# Patient Record
Sex: MALE | Race: WHITE | NOT HISPANIC OR LATINO | Employment: OTHER | URBAN - METROPOLITAN AREA
[De-identification: names, ages, dates, MRNs, and addresses within clinical notes are randomized per-mention and may not be internally consistent; named-entity substitution may affect disease eponyms.]

---

## 2017-07-05 ENCOUNTER — HOSPITAL ENCOUNTER (INPATIENT)
Facility: HOSPITAL | Age: 72
LOS: 15 days | Discharge: NURSING, REHAB OR LONG-TERM CARE | End: 2017-07-21
Attending: EMERGENCY MEDICINE | Admitting: HOSPITALIST

## 2017-07-05 ENCOUNTER — APPOINTMENT (OUTPATIENT)
Dept: GENERAL RADIOLOGY | Facility: HOSPITAL | Age: 72
End: 2017-07-05

## 2017-07-05 DIAGNOSIS — I50.23 ACUTE ON CHRONIC SYSTOLIC HEART FAILURE (HCC): ICD-10-CM

## 2017-07-05 DIAGNOSIS — Z78.9 IMPAIRED MOBILITY AND ADLS: ICD-10-CM

## 2017-07-05 DIAGNOSIS — E78.5 HYPERLIPIDEMIA LDL GOAL <70: ICD-10-CM

## 2017-07-05 DIAGNOSIS — R60.9 PERIPHERAL EDEMA: Primary | ICD-10-CM

## 2017-07-05 DIAGNOSIS — I25.118 CORONARY ARTERY DISEASE OF NATIVE ARTERY OF NATIVE HEART WITH STABLE ANGINA PECTORIS (HCC): ICD-10-CM

## 2017-07-05 DIAGNOSIS — I50.23 ACUTE ON CHRONIC SYSTOLIC CONGESTIVE HEART FAILURE (HCC): ICD-10-CM

## 2017-07-05 DIAGNOSIS — Z74.09 IMPAIRED FUNCTIONAL MOBILITY, BALANCE, GAIT, AND ENDURANCE: ICD-10-CM

## 2017-07-05 DIAGNOSIS — J90 PLEURAL EFFUSION: ICD-10-CM

## 2017-07-05 DIAGNOSIS — Z74.09 IMPAIRED MOBILITY AND ADLS: ICD-10-CM

## 2017-07-05 DIAGNOSIS — J81.0 ACUTE PULMONARY EDEMA (HCC): ICD-10-CM

## 2017-07-05 LAB
ALBUMIN SERPL-MCNC: 3.4 G/DL (ref 3.2–4.8)
ALBUMIN/GLOB SERPL: 1.3 G/DL (ref 1.5–2.5)
ALP SERPL-CCNC: 110 U/L (ref 25–100)
ALT SERPL W P-5'-P-CCNC: 28 U/L (ref 7–40)
ANION GAP SERPL CALCULATED.3IONS-SCNC: 8 MMOL/L (ref 3–11)
AST SERPL-CCNC: 37 U/L (ref 0–33)
BASOPHILS # BLD AUTO: 0.02 10*3/MM3 (ref 0–0.2)
BASOPHILS NFR BLD AUTO: 0.2 % (ref 0–1)
BILIRUB SERPL-MCNC: 1 MG/DL (ref 0.3–1.2)
BNP SERPL-MCNC: 2432 PG/ML (ref 0–100)
BUN BLD-MCNC: 19 MG/DL (ref 9–23)
BUN/CREAT SERPL: 14.6 (ref 7–25)
CALCIUM SPEC-SCNC: 9 MG/DL (ref 8.7–10.4)
CHLORIDE SERPL-SCNC: 101 MMOL/L (ref 99–109)
CO2 SERPL-SCNC: 31 MMOL/L (ref 20–31)
CREAT BLD-MCNC: 1.3 MG/DL (ref 0.6–1.3)
DEPRECATED RDW RBC AUTO: 53.2 FL (ref 37–54)
EOSINOPHIL # BLD AUTO: 0.11 10*3/MM3 (ref 0–0.3)
EOSINOPHIL NFR BLD AUTO: 1.2 % (ref 0–3)
ERYTHROCYTE [DISTWIDTH] IN BLOOD BY AUTOMATED COUNT: 16.4 % (ref 11.3–14.5)
GFR SERPL CREATININE-BSD FRML MDRD: 54 ML/MIN/1.73
GLOBULIN UR ELPH-MCNC: 2.7 GM/DL
GLUCOSE BLD-MCNC: 138 MG/DL (ref 70–100)
HCT VFR BLD AUTO: 41.1 % (ref 38.9–50.9)
HGB BLD-MCNC: 13 G/DL (ref 13.1–17.5)
HOLD SPECIMEN: NORMAL
HOLD SPECIMEN: NORMAL
IMM GRANULOCYTES # BLD: 0.03 10*3/MM3 (ref 0–0.03)
IMM GRANULOCYTES NFR BLD: 0.3 % (ref 0–0.6)
LIPASE SERPL-CCNC: 38 U/L (ref 6–51)
LYMPHOCYTES # BLD AUTO: 0.61 10*3/MM3 (ref 0.6–4.8)
LYMPHOCYTES NFR BLD AUTO: 6.5 % (ref 24–44)
MCH RBC QN AUTO: 28.3 PG (ref 27–31)
MCHC RBC AUTO-ENTMCNC: 31.6 G/DL (ref 32–36)
MCV RBC AUTO: 89.3 FL (ref 80–99)
MONOCYTES # BLD AUTO: 1 10*3/MM3 (ref 0–1)
MONOCYTES NFR BLD AUTO: 10.7 % (ref 0–12)
NEUTROPHILS # BLD AUTO: 7.58 10*3/MM3 (ref 1.5–8.3)
NEUTROPHILS NFR BLD AUTO: 81.1 % (ref 41–71)
PLATELET # BLD AUTO: 235 10*3/MM3 (ref 150–450)
PMV BLD AUTO: 9.9 FL (ref 6–12)
POTASSIUM BLD-SCNC: 3.5 MMOL/L (ref 3.5–5.5)
PROT SERPL-MCNC: 6.1 G/DL (ref 5.7–8.2)
RBC # BLD AUTO: 4.6 10*6/MM3 (ref 4.2–5.76)
SODIUM BLD-SCNC: 140 MMOL/L (ref 132–146)
TROPONIN I SERPL-MCNC: 0.02 NG/ML (ref 0–0.07)
WBC NRBC COR # BLD: 9.35 10*3/MM3 (ref 3.5–10.8)
WHOLE BLOOD HOLD SPECIMEN: NORMAL
WHOLE BLOOD HOLD SPECIMEN: NORMAL

## 2017-07-05 PROCEDURE — 71010 HC CHEST PA OR AP: CPT

## 2017-07-05 PROCEDURE — 93005 ELECTROCARDIOGRAM TRACING: CPT | Performed by: EMERGENCY MEDICINE

## 2017-07-05 PROCEDURE — 83690 ASSAY OF LIPASE: CPT | Performed by: EMERGENCY MEDICINE

## 2017-07-05 PROCEDURE — 84484 ASSAY OF TROPONIN QUANT: CPT

## 2017-07-05 PROCEDURE — 80053 COMPREHEN METABOLIC PANEL: CPT | Performed by: EMERGENCY MEDICINE

## 2017-07-05 PROCEDURE — 99285 EMERGENCY DEPT VISIT HI MDM: CPT

## 2017-07-05 PROCEDURE — 85025 COMPLETE CBC W/AUTO DIFF WBC: CPT | Performed by: EMERGENCY MEDICINE

## 2017-07-05 PROCEDURE — 83880 ASSAY OF NATRIURETIC PEPTIDE: CPT | Performed by: EMERGENCY MEDICINE

## 2017-07-05 RX ORDER — ETHACRYNATE SODIUM 50 MG/50ML
50 INJECTION, POWDER, FOR SOLUTION INTRAVENOUS ONCE
Status: DISCONTINUED | OUTPATIENT
Start: 2017-07-05 | End: 2017-07-06

## 2017-07-05 RX ORDER — SODIUM CHLORIDE 0.9 % (FLUSH) 0.9 %
10 SYRINGE (ML) INJECTION AS NEEDED
Status: DISCONTINUED | OUTPATIENT
Start: 2017-07-05 | End: 2017-07-06

## 2017-07-05 RX ORDER — ASPIRIN 81 MG/1
324 TABLET, CHEWABLE ORAL ONCE
Status: DISCONTINUED | OUTPATIENT
Start: 2017-07-05 | End: 2017-07-05

## 2017-07-06 ENCOUNTER — APPOINTMENT (OUTPATIENT)
Dept: CARDIOLOGY | Facility: HOSPITAL | Age: 72
End: 2017-07-06

## 2017-07-06 PROBLEM — E11.9 DM (DIABETES MELLITUS) (HCC): Status: ACTIVE | Noted: 2017-07-06

## 2017-07-06 PROBLEM — I50.22 CHRONIC SYSTOLIC CHF (CONGESTIVE HEART FAILURE) (HCC): Status: ACTIVE | Noted: 2017-07-06

## 2017-07-06 PROBLEM — I25.10 CAD (CORONARY ARTERY DISEASE): Status: ACTIVE | Noted: 2017-07-06

## 2017-07-06 PROBLEM — I25.118 CORONARY ARTERY DISEASE OF NATIVE ARTERY OF NATIVE HEART WITH STABLE ANGINA PECTORIS (HCC): Status: ACTIVE | Noted: 2017-07-06

## 2017-07-06 PROBLEM — R60.9 PERIPHERAL EDEMA: Status: ACTIVE | Noted: 2017-07-06

## 2017-07-06 PROBLEM — E78.5 HYPERLIPIDEMIA: Status: ACTIVE | Noted: 2017-07-06

## 2017-07-06 PROBLEM — G62.9 PERIPHERAL NEUROPATHY: Status: ACTIVE | Noted: 2017-07-06

## 2017-07-06 PROBLEM — L97.929 BILATERAL LEG ULCER (HCC): Status: ACTIVE | Noted: 2017-07-06

## 2017-07-06 PROBLEM — L97.919 BILATERAL LEG ULCER (HCC): Status: ACTIVE | Noted: 2017-07-06

## 2017-07-06 PROBLEM — I50.23 ACUTE ON CHRONIC SYSTOLIC HEART FAILURE (HCC): Status: ACTIVE | Noted: 2017-07-06

## 2017-07-06 PROBLEM — I50.9 ACUTE ON CHRONIC HEART FAILURE (HCC): Status: ACTIVE | Noted: 2017-07-06

## 2017-07-06 PROBLEM — I10 HTN (HYPERTENSION): Status: ACTIVE | Noted: 2017-07-06
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
## 2017-07-06 PROBLEM — L03.90 CELLULITIS: Status: ACTIVE | Noted: 2017-07-06

## 2017-07-06 PROBLEM — N18.9 CKD (CHRONIC KIDNEY DISEASE): Status: ACTIVE | Noted: 2017-07-06

## 2017-07-06 LAB
BH CV LOWER VASCULAR LEFT COMMON FEMORAL AUGMENT: NORMAL
BH CV LOWER VASCULAR LEFT COMMON FEMORAL COMPRESS: NORMAL
BH CV LOWER VASCULAR LEFT COMMON FEMORAL PHASIC: NORMAL
BH CV LOWER VASCULAR LEFT COMMON FEMORAL SPONT: NORMAL
BH CV LOWER VASCULAR LEFT DISTAL FEMORAL COMPRESS: NORMAL
BH CV LOWER VASCULAR LEFT GASTRONEMIUS COMPRESS: NORMAL
BH CV LOWER VASCULAR LEFT GREATER SAPH AK COMPRESS: NORMAL
BH CV LOWER VASCULAR LEFT GREATER SAPH BK COMPRESS: NORMAL
BH CV LOWER VASCULAR LEFT MID FEMORAL AUGMENT: NORMAL
BH CV LOWER VASCULAR LEFT MID FEMORAL COMPRESS: NORMAL
BH CV LOWER VASCULAR LEFT MID FEMORAL PHASIC: NORMAL
BH CV LOWER VASCULAR LEFT MID FEMORAL SPONT: NORMAL
BH CV LOWER VASCULAR LEFT PERONEAL COMPRESS: NORMAL
BH CV LOWER VASCULAR LEFT POPLITEAL AUGMENT: NORMAL
BH CV LOWER VASCULAR LEFT POPLITEAL COMPRESS: NORMAL
BH CV LOWER VASCULAR LEFT POPLITEAL PHASIC: NORMAL
BH CV LOWER VASCULAR LEFT POPLITEAL SPONT: NORMAL
BH CV LOWER VASCULAR LEFT POSTERIOR TIBIAL COMPRESS: NORMAL
BH CV LOWER VASCULAR LEFT PROXIMAL FEMORAL COMPRESS: NORMAL
BH CV LOWER VASCULAR LEFT SAPHENOFEMORAL JUNCTION AUGMENT: NORMAL
BH CV LOWER VASCULAR LEFT SAPHENOFEMORAL JUNCTION COMPRESS: NORMAL
BH CV LOWER VASCULAR LEFT SAPHENOFEMORAL JUNCTION PHASIC: NORMAL
BH CV LOWER VASCULAR LEFT SAPHENOFEMORAL JUNCTION SPONT: NORMAL
BH CV LOWER VASCULAR RIGHT COMMON FEMORAL AUGMENT: NORMAL
BH CV LOWER VASCULAR RIGHT COMMON FEMORAL COMPRESS: NORMAL
BH CV LOWER VASCULAR RIGHT COMMON FEMORAL PHASIC: NORMAL
BH CV LOWER VASCULAR RIGHT COMMON FEMORAL SPONT: NORMAL
BH CV LOWER VASCULAR RIGHT DISTAL FEMORAL COMPRESS: NORMAL
BH CV LOWER VASCULAR RIGHT GASTRONEMIUS COMPRESS: NORMAL
BH CV LOWER VASCULAR RIGHT GREATER SAPH AK COMPRESS: NORMAL
BH CV LOWER VASCULAR RIGHT GREATER SAPH BK COMPRESS: NORMAL
BH CV LOWER VASCULAR RIGHT LESSER SAPH COMPRESS: NORMAL
BH CV LOWER VASCULAR RIGHT MID FEMORAL AUGMENT: NORMAL
BH CV LOWER VASCULAR RIGHT MID FEMORAL COMPRESS: NORMAL
BH CV LOWER VASCULAR RIGHT MID FEMORAL PHASIC: NORMAL
BH CV LOWER VASCULAR RIGHT MID FEMORAL SPONT: NORMAL
BH CV LOWER VASCULAR RIGHT PERONEAL COMPRESS: NORMAL
BH CV LOWER VASCULAR RIGHT POPLITEAL AUGMENT: NORMAL
BH CV LOWER VASCULAR RIGHT POPLITEAL COMPRESS: NORMAL
BH CV LOWER VASCULAR RIGHT POPLITEAL PHASIC: NORMAL
BH CV LOWER VASCULAR RIGHT POPLITEAL SPONT: NORMAL
BH CV LOWER VASCULAR RIGHT POSTERIOR TIBIAL COMPRESS: NORMAL
BH CV LOWER VASCULAR RIGHT PROXIMAL FEMORAL COMPRESS: NORMAL
BH CV LOWER VASCULAR RIGHT SAPHENOFEMORAL JUNCTION AUGMENT: NORMAL
BH CV LOWER VASCULAR RIGHT SAPHENOFEMORAL JUNCTION COMPRESS: NORMAL
BH CV LOWER VASCULAR RIGHT SAPHENOFEMORAL JUNCTION PHASIC: NORMAL
BH CV LOWER VASCULAR RIGHT SAPHENOFEMORAL JUNCTION SPONT: NORMAL
GLUCOSE BLDC GLUCOMTR-MCNC: 116 MG/DL (ref 70–130)
GLUCOSE BLDC GLUCOMTR-MCNC: 176 MG/DL (ref 70–130)
GLUCOSE BLDC GLUCOMTR-MCNC: 176 MG/DL (ref 70–130)
TROPONIN I SERPL-MCNC: 0.02 NG/ML
TROPONIN I SERPL-MCNC: 0.02 NG/ML

## 2017-07-06 PROCEDURE — 97165 OT EVAL LOW COMPLEX 30 MIN: CPT

## 2017-07-06 PROCEDURE — 63710000001 INSULIN LISPRO (HUMAN) PER 5 UNITS: Performed by: HOSPITALIST

## 2017-07-06 PROCEDURE — 25010000002 HEPARIN (PORCINE) PER 1000 UNITS: Performed by: NURSE PRACTITIONER

## 2017-07-06 PROCEDURE — 93010 ELECTROCARDIOGRAM REPORT: CPT | Performed by: INTERNAL MEDICINE

## 2017-07-06 PROCEDURE — 93970 EXTREMITY STUDY: CPT | Performed by: INTERNAL MEDICINE

## 2017-07-06 PROCEDURE — 97162 PT EVAL MOD COMPLEX 30 MIN: CPT

## 2017-07-06 PROCEDURE — 84484 ASSAY OF TROPONIN QUANT: CPT | Performed by: NURSE PRACTITIONER

## 2017-07-06 PROCEDURE — 93970 EXTREMITY STUDY: CPT

## 2017-07-06 PROCEDURE — 99223 1ST HOSP IP/OBS HIGH 75: CPT | Performed by: INTERNAL MEDICINE

## 2017-07-06 PROCEDURE — 93005 ELECTROCARDIOGRAM TRACING: CPT | Performed by: NURSE PRACTITIONER

## 2017-07-06 PROCEDURE — 99222 1ST HOSP IP/OBS MODERATE 55: CPT | Performed by: INTERNAL MEDICINE

## 2017-07-06 PROCEDURE — 82962 GLUCOSE BLOOD TEST: CPT

## 2017-07-06 RX ORDER — ATORVASTATIN CALCIUM 40 MG/1
40 TABLET, FILM COATED ORAL NIGHTLY
Status: DISCONTINUED | OUTPATIENT
Start: 2017-07-06 | End: 2017-07-06

## 2017-07-06 RX ORDER — BUMETANIDE 0.25 MG/ML
1 INJECTION INTRAMUSCULAR; INTRAVENOUS ONCE
Status: DISCONTINUED | OUTPATIENT
Start: 2017-07-06 | End: 2017-07-06

## 2017-07-06 RX ORDER — HEPARIN SODIUM 5000 [USP'U]/ML
5000 INJECTION, SOLUTION INTRAVENOUS; SUBCUTANEOUS EVERY 12 HOURS SCHEDULED
Status: DISCONTINUED | OUTPATIENT
Start: 2017-07-06 | End: 2017-07-21 | Stop reason: HOSPADM

## 2017-07-06 RX ORDER — ATORVASTATIN CALCIUM 20 MG/1
20 TABLET, FILM COATED ORAL NIGHTLY
Status: DISCONTINUED | OUTPATIENT
Start: 2017-07-06 | End: 2017-07-21 | Stop reason: HOSPADM

## 2017-07-06 RX ORDER — NICOTINE POLACRILEX 4 MG
15 LOZENGE BUCCAL
Status: DISCONTINUED | OUTPATIENT
Start: 2017-07-06 | End: 2017-07-21 | Stop reason: HOSPADM

## 2017-07-06 RX ORDER — SODIUM CHLORIDE 0.9 % (FLUSH) 0.9 %
1-10 SYRINGE (ML) INJECTION AS NEEDED
Status: DISCONTINUED | OUTPATIENT
Start: 2017-07-06 | End: 2017-07-21 | Stop reason: HOSPADM

## 2017-07-06 RX ORDER — ACETAMINOPHEN 325 MG/1
650 TABLET ORAL EVERY 4 HOURS PRN
Status: DISCONTINUED | OUTPATIENT
Start: 2017-07-06 | End: 2017-07-21 | Stop reason: HOSPADM

## 2017-07-06 RX ORDER — ETHACRYNIC ACID 25 MG/1
50 TABLET ORAL DAILY
Status: DISCONTINUED | OUTPATIENT
Start: 2017-07-06 | End: 2017-07-06

## 2017-07-06 RX ORDER — LOSARTAN POTASSIUM 50 MG/1
50 TABLET ORAL DAILY
Status: DISCONTINUED | OUTPATIENT
Start: 2017-07-06 | End: 2017-07-21 | Stop reason: HOSPADM

## 2017-07-06 RX ORDER — NITROGLYCERIN 0.4 MG/1
0.4 TABLET SUBLINGUAL
Status: DISCONTINUED | OUTPATIENT
Start: 2017-07-06 | End: 2017-07-21 | Stop reason: HOSPADM

## 2017-07-06 RX ORDER — DEXTROSE MONOHYDRATE 25 G/50ML
25 INJECTION, SOLUTION INTRAVENOUS
Status: DISCONTINUED | OUTPATIENT
Start: 2017-07-06 | End: 2017-07-21 | Stop reason: HOSPADM

## 2017-07-06 RX ORDER — CARVEDILOL 3.12 MG/1
3.12 TABLET ORAL EVERY 12 HOURS SCHEDULED
Status: DISCONTINUED | OUTPATIENT
Start: 2017-07-06 | End: 2017-07-21 | Stop reason: HOSPADM

## 2017-07-06 RX ORDER — ASPIRIN 81 MG/1
81 TABLET ORAL DAILY
Status: DISCONTINUED | OUTPATIENT
Start: 2017-07-06 | End: 2017-07-21 | Stop reason: HOSPADM

## 2017-07-06 RX ORDER — CALCIUM CARBONATE 200(500)MG
2 TABLET,CHEWABLE ORAL 2 TIMES DAILY PRN
Status: DISCONTINUED | OUTPATIENT
Start: 2017-07-06 | End: 2017-07-21 | Stop reason: HOSPADM

## 2017-07-06 RX ORDER — BUMETANIDE 0.25 MG/ML
1 INJECTION INTRAMUSCULAR; INTRAVENOUS 2 TIMES DAILY
Status: DISCONTINUED | OUTPATIENT
Start: 2017-07-06 | End: 2017-07-08

## 2017-07-06 RX ORDER — NYSTATIN 100000 [USP'U]/G
POWDER TOPICAL EVERY 12 HOURS SCHEDULED
Status: DISCONTINUED | OUTPATIENT
Start: 2017-07-06 | End: 2017-07-21 | Stop reason: HOSPADM

## 2017-07-06 RX ORDER — TRAZODONE HYDROCHLORIDE 50 MG/1
50 TABLET ORAL NIGHTLY PRN
COMMUNITY
End: 2017-07-21 | Stop reason: HOSPADM

## 2017-07-06 RX ORDER — ASPIRIN 81 MG/1
81 TABLET, CHEWABLE ORAL DAILY
COMMUNITY

## 2017-07-06 RX ADMIN — ASPIRIN 81 MG: 81 TABLET, COATED ORAL at 15:43

## 2017-07-06 RX ADMIN — ETHACRYNIC ACID 50 MG: 25 TABLET ORAL at 02:32

## 2017-07-06 RX ADMIN — CARVEDILOL 3.12 MG: 3.12 TABLET, FILM COATED ORAL at 15:43

## 2017-07-06 RX ADMIN — ATORVASTATIN CALCIUM 20 MG: 20 TABLET, FILM COATED ORAL at 21:32

## 2017-07-06 RX ADMIN — DOXYCYCLINE 100 MG: 100 INJECTION, POWDER, LYOPHILIZED, FOR SOLUTION INTRAVENOUS at 15:43

## 2017-07-06 RX ADMIN — CARVEDILOL 3.12 MG: 3.12 TABLET, FILM COATED ORAL at 21:33

## 2017-07-06 RX ADMIN — NYSTATIN: 100000 POWDER TOPICAL at 15:44

## 2017-07-06 RX ADMIN — BUMETANIDE 1 MG: 0.25 INJECTION INTRAMUSCULAR; INTRAVENOUS at 18:33

## 2017-07-06 RX ADMIN — BUMETANIDE 1 MG: 0.25 INJECTION INTRAMUSCULAR; INTRAVENOUS at 15:44

## 2017-07-06 RX ADMIN — HEPARIN SODIUM 5000 UNITS: 5000 INJECTION, SOLUTION INTRAVENOUS; SUBCUTANEOUS at 10:58

## 2017-07-06 RX ADMIN — INSULIN LISPRO 2 UNITS: 100 INJECTION, SOLUTION INTRAVENOUS; SUBCUTANEOUS at 21:33

## 2017-07-06 RX ADMIN — HEPARIN SODIUM 5000 UNITS: 5000 INJECTION, SOLUTION INTRAVENOUS; SUBCUTANEOUS at 21:32

## 2017-07-06 RX ADMIN — INSULIN LISPRO 2 UNITS: 100 INJECTION, SOLUTION INTRAVENOUS; SUBCUTANEOUS at 16:05

## 2017-07-06 RX ADMIN — NYSTATIN: 100000 POWDER TOPICAL at 21:33

## 2017-07-06 RX ADMIN — LOSARTAN POTASSIUM 50 MG: 50 TABLET, FILM COATED ORAL at 10:58

## 2017-07-06 NOTE — PROGRESS NOTES
"Adult Nutrition  Assessment/PES    Patient Name:  Luis F Plummer  YOB: 1945  MRN: [id number]  Admit Date:  7/5/2017    Assessment Date:  7/6/2017        Reason for Assessment       07/06/17 1546    Reason for Assessment    Reason For Assessment/Visit education    Time Spent (min) 15    Cardiac --   HF    Endocrine DM    Skin --   both great toes and bilat 3rd digit skin ulcers per MD notes    Other diagnosis --   BLE edema, bed bug bites both LE                Anthropometrics       07/06/17 1548    Anthropometrics    Height 180.3 cm (71\")    Weight 77.1 kg (170 lb)    Ideal Body Weight (IBW)    Ideal Body Weight (IBW), Male (kg) 79.27    % Ideal Body Weight 97.48    Body Mass Index (BMI)    BMI (kg/m2) 23.76            Labs/Tests/Procedures/Meds       07/06/17 1552    Labs/Tests/Procedures/Meds    Labs/Tests Review Reviewed   Hgb A1C value pending.                Nutrition Prescription Ordered       07/06/17 1550    Nutrition Prescription PO    Current PO Diet Regular    Common Modifiers Cardiac;Consistent Carbohydrate      07/06/17 1548    Nutrition Prescription PO    Common Modifiers Cardiac;Consistent Carbohydrate            Evaluation of Received Nutrient/Fluid Intake       07/06/17 1550    PO Evaluation    Number of Days PO Intake Evaluated Insufficient Data    Number of Meals 2    % PO Intake 100              Problem/Interventions:        Problem 1       07/06/17 1553    Nutrition Diagnoses Problem 1    Problem 1 Increased Nutrient Needs    Macronutrient Protein    Etiology (related to) --   decreased skin integrity    Signs/Symptoms (evidenced by) --   bilat great toe skin ulcers and bilat 3rd digit ulcer per MD notes.                    Intervention Goal       07/06/17 1550    Intervention Goal    PO Establish PO            Nutrition Intervention       07/06/17 1550    Nutrition Intervention    RD/Tech Action Follow Tx progress; supplement provided in an effort to promote po intake of  " additional leoncio and pro/d to support skin breakdown healing.            Nutrition Prescription       07/06/17 1550    Nutrition Prescription PO    PO Prescription Begin/change supplement    Supplement Boost Glucose Control    Supplement Frequency 2 times a day    New PO Prescription Ordered? Yes            Education/Evaluation       07/06/17 1551    Education    Education Education not appropriate at this time   RD spoke with nsg staff, and could see that  pt sleepy at time of attempted visit.      Monitor/Evaluation    Monitor Per protocol        Comments:      Electronically signed by:  Yris Cat MS,AGATHA,LD  07/06/17 3:55 PM

## 2017-07-06 NOTE — THERAPY EVALUATION
Acute Care - Physical Therapy Initial Evaluation  [facility]     Patient Name: Luis F Plummer  : 1945  MRN: [id number]  Today's Date: 2017   Onset of Illness/Injury or Date of Surgery Date: 17  Date of Referral to PT: 17  Referring Physician: FRANCO Irizarry      Admit Date: 2017     Visit Dx:    ICD-10-CM ICD-9-CM   1. Peripheral edema R60.9 782.3   2. Pleural effusion J90 511.9   3. Acute pulmonary edema J81.0 518.4   4. Acute on chronic systolic congestive heart failure I50.23 428.23     428.0   5. Hyperlipidemia LDL goal <70 E78.5 272.4   6. Acute on chronic systolic heart failure I50.23 428.23   7. Coronary artery disease of native artery of native heart with stable angina pectoris I25.118 414.01     413.9   8. Impaired mobility and ADLs Z74.09 799.89   9. Impaired functional mobility, balance, gait, and endurance Z74.09 V49.89     Patient Active Problem List   Diagnosis   • Peripheral edema   • Acute on chronic systolic heart failure   • Coronary artery disease of native artery of native heart with stable angina pectoris   • Essential hypertension   • Hyperlipidemia LDL goal <70   • DM (diabetes mellitus)   • Bilateral leg ulcer   • CKD (chronic kidney disease)   • Bed bug bite   • Peripheral neuropathy   • Cellulitis-legs/feet with chronic ulcer     Past Medical History:   Diagnosis Date   • Diabetes mellitus      Past Surgical History:   Procedure Laterality Date   • BACK SURGERY            PT ASSESSMENT (last 72 hours)      PT Evaluation       17 1311 17 1310    Rehab Evaluation    Document Type evaluation  -JR evaluation  -SJ    Subjective Information no complaints;agree to therapy  -JR no complaints;agree to therapy  -SJ    Patient Effort, Rehab Treatment adequate  -JR adequate  -SJ    Symptoms Noted During/After Treatment none  -JR none  -SJ    General Information    Patient Profile Review yes  -JR yes  -SJ    Onset of Illness/Injury or Date of Surgery Date  07/05/17  -JR 07/05/17  -SJ    Referring Physician FRANCO Irizarry  -FRANCO Dorado  -LUCIUS    General Observations  Pt supine in bed, awake, has tele. BLE's swollen, with multiple scabs  -SJ    Pertinent History Of Current Problem Pt admitted with recurrent B LE swelling which has worsened x 4 days. Pt found to have acute exacerbation of CHF.  -JR Pt admitted with recurrent BLE edema, found to have acute exacerbation of CHF  -SJ    Precautions/Limitations fall precautions;oxygen therapy device and L/min   contact precautions  -JR fall precautions   contact precautions, contamination (bed bugs)  -SJ    Prior Level of Function independent:;gait;transfer;bed mobility;ADL's   Was using cane prior to admit  -JR independent:;gait;transfer;bed mobility  -SJ    Equipment Currently Used at Home wheelchair;walker, rolling  -JR wheelchair;walker, rolling;cane, straight  -SJ    Plans/Goals Discussed With patient;agreed upon  -JR patient;agreed upon  -SJ    Risks Reviewed patient:;increased discomfort  -JR patient:;LOB;increased discomfort  -SJ    Benefits Reviewed patient:;improve function;increase independence  -JR patient:;improve function;increase independence;increase strength;increase balance;increase knowledge  -SJ    Barriers to Rehab none identified  -JR none identified  -SJ    Living Environment    Lives With alone  -JR alone  -SJ    Living Arrangements house  -JR house  -SJ    Home Accessibility stairs to enter home  -JR stairs to enter home  -SJ    Number of Stairs to Enter Home 1  -JR 1  -SJ    Clinical Impression    Date of Referral to PT  07/06/17  -SJ    PT Diagnosis  impaired mobility  -SJ    Patient/Family Goals Statement  did not state  -SJ    Criteria for Skilled Therapeutic Interventions Met  yes;treatment indicated  -SJ    Pathology/Pathophysiology Noted (Describe Specifically for Each System)  musculoskeletal;integumentary  -SJ    Impairments Found (describe specific impairments)  gait, locomotion, and  balance;arousal, attention, and cognition  -SJ    Functional Limitations in Following Categories (Describe Specific Limitations)  self-care;home management;community/leisure  -SJ    Rehab Potential  good, to achieve stated therapy goals  -SJ    Predicted Duration of Therapy Intervention (days/wks)  2wks  -SJ    Vital Signs    Pre Systolic BP Rehab 142  -  -SJ    Pre Treatment Diastolic BP 98  -JR 98  -SJ    Post Systolic BP Rehab 148  -  -SJ    Post Treatment Diastolic BP 97  -JR 97  -SJ    Pretreatment Heart Rate (beats/min) 73  -JR 73  -SJ    Posttreatment Heart Rate (beats/min) 77  -JR 77  -SJ    Pre SpO2 (%) 90  -JR 90  -SJ    O2 Delivery Pre Treatment room air  -JR room air  -SJ    Post SpO2 (%) 100  -  -SJ    O2 Delivery Post Treatment room air  -JR room air  -SJ    Pre Patient Position Supine  -JR Supine  -SJ    Intra Patient Position Standing  -JR Standing  -SJ    Post Patient Position Sitting  -JR Sitting  -SJ    Pain Assessment    Pain Assessment 0-10  -JR 0-10  -SJ    Pain Score 7  -JR 7  -SJ    Post Pain Score 7  -JR 7  -SJ    Pain Location Leg  -JR Leg  -SJ    Pain Orientation Right;Left  -JR Right;Left  -SJ    Pain Intervention(s) Ambulation/increased activity  -JR Repositioned;Ambulation/increased activity;Elevated  -SJ    Response to Interventions tolerated  -JR jocelin  -SJ    Cognitive Assessment/Intervention    Current Cognitive/Communication Assessment impaired  -JR impaired  -SJ    Orientation Status oriented to;person;place  -JR oriented to;person;place  -SJ    Follows Commands/Answers Questions 100% of the time;able to follow single-step instructions  -% of the time;able to follow single-step instructions;needs cueing;needs increased time  -SJ    Personal Safety mild impairment;decreased awareness, need for assist;decreased awareness, need for safety;decreased insight to deficits  -JR mild impairment;decreased awareness, need for assist;decreased awareness, need for  safety;decreased insight to deficits  -SJ    Additional Documentation  Cognitive Assessment Intervention (Group)  -SJ    Cognitive Assessment Intervention    Behavior/Mood Observations  cooperative;other (see comments)   distrustful  -SJ    ROM (Range of Motion)    General ROM no range of motion deficits identified  -JR no range of motion deficits identified  -SJ    MMT (Manual Muscle Testing)    General MMT Assessment no strength deficits identified  -JR lower extremity strength deficits identified  -SJ    General MMT Assessment Detail  Bilat hip flex 3+/5, bilat knee flex/ext 4/5  -SJ    Bed Mobility, Assessment/Treatment    Bed Mobility, Assistive Device head of bed elevated  -JR head of bed elevated  -SJ    Bed Mob, Supine to Sit, [place] supervision required  -JR supervision required  -SJ    Transfer Assessment/Treatment    Transfers, Bed-Chair [place] moderate assist (50% patient effort);2 person assist required  -JR moderate assist (50% patient effort);2 person assist required;verbal cues required  -SJ    Transfers, Bed-Chair-Bed, Assist Device other (see comments)   UE support x2  -JR other (see comments)   BUE support x2  -SJ    Transfers, Sit-Stand [place] contact guard assist;2 person assist required  -JR contact guard assist;2 person assist required;verbal cues required  -SJ    Transfers, Stand-Sit [place] contact guard assist;2 person assist required  -JR contact guard assist;2 person assist required;verbal cues required  -SJ    Transfers, Sit-Stand-Sit, Assist Device rolling walker  -JR rolling walker  -SJ    Transfer, Safety Issues balance decreased during turns;step length decreased;weight-shifting ability decreased  -JR balance decreased during turns;step length decreased;weight-shifting ability decreased;loses balance backward  -SJ    Transfer, Impairments strength decreased;impaired balance;postural control impaired  -JR strength decreased;impaired balance;postural control  impaired  -SJ    Transfer, Comment Pt intially leaning posterior and to the R in standing, transferred to the chair. Pt stood with walker with CGA with verbal cues for hand placement with transfer.  - cues for safety and sequencing  -    Gait Assessment/Treatment    Gait, [place] Level  contact guard assist;2 person assist required;verbal cues required  -SJ    Gait, Assistive Device  rolling walker  -    Gait, Distance (Feet)  20  -SJ    Gait, Gait Pattern Analysis  swing-through gait  -    Gait, Gait Deviations  antalgic;[place] decreased;decreased heel strike;double stance time increased;forward flexed posture  -    Gait, Safety Issues  sequencing ability decreased;step length decreased  -    Gait, Impairments  strength decreased;impaired balance;coordination impaired;pain  -    Gait, Comment  distance limited to in-room due to containment precautions  -    Motor Skills/Interventions    Additional Documentation Balance Skills Training (Group)  -     Balance Skills Training    Sitting-Level of Assistance Independent  - Independent  -    Sitting-Balance Support Feet supported  - Feet supported  -    Standing-Level of Assistance Contact guard  -JR Contact guard  -    Static Standing Balance Support assistive device  - assistive device  -    Gait Balance-Level of Assistance Contact guard  -JR Contact guard  -SJ    Gait Balance Support assistive device  -JR assistive device  -    Positioning and Restraints    Pre-Treatment Position in bed  -JR in bed  -    Post Treatment Position chair  - chair  -    In Chair notified nsg;reclined;call light within reach;encouraged to call for assist;RLE elevated;LLE elevated;exit alarm on  -JR reclined;call light within reach;encouraged to call for assist;exit alarm on;heels elevated  -      07/06/17 1244 07/06/17 0300    General Information    Equipment Currently Used at Home none   Pt reports he has a wheelchair and walker but doesn't  use  -BG none  -CJ    Living Environment    Lives With alone  -BG alone  -CJ    Living Arrangements house  -BG house  -CJ    Home Accessibility no concerns  -BG bed and bath on same level  -CJ    Stair Railings at Home  inside, present at both sides  -CJ    Type of Financial/Environmental Concern  none  -CJ    Transportation Available car;family or friend will provide  -BG none  -CJ      User Key  (r) = Recorded By, (t) = Taken By, (c) = Cosigned By    Initials Name Provider Type     Sakshi Gutierrez, PT Physical Therapist    JR Hilary Bolden, OT Occupational Therapist    RADAMES Ortega, RN Registered Nurse    BG Josephine Sheppard, MSW           Physical Therapy Education     Title: PT OT SLP Therapies (Active)     Topic: Physical Therapy (Active)     Point: Mobility training (Active)    Learning Progress Summary    Learner Readiness Method Response Comment Documented by Status   Patient Acceptance E NR   07/06/17 1412 Active               Point: Body mechanics (Active)    Learning Progress Summary    Learner Readiness Method Response Comment Documented by Status   Patient Acceptance E NR   07/06/17 1412 Active               Point: Precautions (Active)    Learning Progress Summary    Learner Readiness Method Response Comment Documented by Status   Patient Acceptance E NR   07/06/17 1412 Active                      User Key     Initials Effective Dates Name Provider Type Discipline     06/19/15 -  Sakshi Gutierrez, PT Physical Therapist PT                PT Recommendation and Plan  Anticipated Discharge Disposition: inpatient rehabilitation facility  Planned Therapy Interventions: balance training, bed mobility training, gait training, home exercise program, patient/family education, strengthening, transfer training  PT Frequency: daily  Plan of Care Review  Plan Of Care Reviewed With: patient  Progress: progress toward functional goals as expected  Outcome Summary/Follow up Plan: Pt  presents with weakness and decreased independence with functional mobility. Pt ambulated 20ft with RW with CGA x2, dem decreased safety awareness, mild confusion. PT recommends d/c to inpatient rehab.          IP PT Goals       07/06/17 1410          Transfer Training PT LTG    Transfer Training PT LTG, Date Established 07/06/17  -SJ      Transfer Training PT LTG, Time to Achieve 2 wks  -SJ      Transfer Training PT LTG, Activity Type bed to chair /chair to bed;sit to stand/stand to sit  -SJ      Transfer Training PT LTG, [place] Level supervision required  -SJ      Transfer Training PT LTG, Assist Device walker, rolling  -SJ      Transfer Training PT LTG, Outcome goal ongoing  -SJ      Gait Training PT LTG    Gait Training Goal PT LTG, Date Established 07/06/17  -SJ      Gait Training Goal PT LTG, Time to Achieve 2 wks  -SJ      Gait Training Goal PT LTG, [place] Level supervision required  -SJ      Gait Training Goal PT LTG, Assist Device walker, rolling  -SJ      Gait Training Goal PT LTG, Distance to Achieve 200  -SJ      Gait Training Goal PT LTG, Outcome goal ongoing  -SJ      Dynamic Standing Balance PT LTG    Dynamic Standing Balance PT LTG, Date Established 07/06/17  -SJ      Dynamic Standing Balance PT LTG, Time to Achieve 2 wks  -SJ      Dynamic Standing Balance PT LTG, [place] Level contact guard assist  -SJ      Dynamic Standing Balance PT LTG, Outcome goal ongoing  -SJ        User Key  (r) = Recorded By, (t) = Taken By, (c) = Cosigned By    Initials Name Provider Type    LUCIUS Gutierrez, PT Physical Therapist                Outcome Measures       07/06/17 1311 07/06/17 1310       How much help from another person do you currently need...    Turning from your back to your side while in flat bed without using bedrails?  4  -SJ     Moving from lying on back to sitting on the side of a flat bed without bedrails?  4  -SJ     Moving to and from a bed to a chair (including a wheelchair)?  3   -SJ     Standing up from a chair using your arms (e.g., wheelchair, bedside chair)?  3  -SJ     Climbing 3-5 steps with a railing?  2  -SJ     To walk in hospital room?  3  -SJ     AM-PAC 6 Clicks Score  19  -SJ     How much help from another is currently needed...    Putting on and taking off regular lower body clothing? 1  -JR      Bathing (including washing, rinsing, and drying) 2  -JR      Toileting (which includes using toilet bed pan or urinal) 2  -JR      Putting on and taking off regular upper body clothing 3  -JR      Taking care of personal grooming (such as brushing teeth) 4  -JR      Eating meals 4  -JR      Score 16  -JR      Functional Assessment    Outcome Measure Options AM-PAC 6 Clicks Daily Activity (OT)  -JR AM-PAC 6 Clicks Basic Mobility (PT)  -       User Key  (r) = Recorded By, (t) = Taken By, (c) = Cosigned By    Initials Name Provider Type     Sakshi Gutierrez PT Physical Therapist    JR Hilary Bolden, OT Occupational Therapist           Time Calculation:         PT Charges       07/06/17 1413          Time Calculation    Start Time 1310  -SJ      PT Received On 07/06/17  -      PT Goal Re-Cert Due Date 07/16/17  -        User Key  (r) = Recorded By, (t) = Taken By, (c) = Cosigned By    Initials Name Provider Type     Sakshi Gutierrez PT Physical Therapist          Therapy Charges for Today     Code Description Service Date Service Provider Modifiers Qty    [id number] HC PT EVAL MOD COMPLEXITY 4 7/6/2017 Sakshi Gutierrez, PT GP 1          PT G-Codes  Outcome Measure Options: AM-PAC 6 Clicks Daily Activity (OT)      Sakshi Gutierrez PT  7/6/2017

## 2017-07-06 NOTE — PLAN OF CARE
Problem: Patient Care Overview (Adult)  Goal: Plan of Care Review  Outcome: Ongoing (interventions implemented as appropriate)    07/06/17 1401   Coping/Psychosocial Response Interventions   Plan Of Care Reviewed With patient   Outcome Evaluation   Outcome Summary/Follow up Plan OT initial eval completed. Pt presents with decreased independence with ADL's and mobility, decreased knowledge of AE. Pt would benefit from continued skilled OT services to assist in returning pt to his PLOF. Recommend inpatient rehab at d/c.         Problem: Inpatient Occupational Therapy  Goal: Bed Mobility Goal LTG- OT  Outcome: Ongoing (interventions implemented as appropriate)    07/06/17 1401   Bed Mobility OT LTG   Bed Mobility OT LTG, Date Established 07/06/17   Bed Mobility OT LTG, Time to Achieve 1 wk   Bed Mobility OT LTG, Activity Type all bed mobility   Bed Mobility OT LTG, [place] Level conditional independence       Goal: Transfer Training Goal 1 LTG- OT  Outcome: Ongoing (interventions implemented as appropriate)    07/06/17 1401   Transfer Training OT LTG   Transfer Training OT LTG, Date Established 07/06/17   Transfer Training OT LTG, Time to Achieve 1 wk   Transfer Training OT LTG, Activity Type bed to chair /chair to bed   Transfer Training OT LTG, [place] Level supervision required   Transfer Training OT LTG, Assist Device other (see comments)  (with appropriate AD)       Goal: LB Dressing Goal LTG- OT  Outcome: Ongoing (interventions implemented as appropriate)    07/06/17 1401   LB Dressing OT LTG   LB Dressing Goal OT LTG, Date Established 07/06/17   LB Dressing Goal OT LTG, Time to Achieve 1 wk   LB Dressing Goal OT LTG, Activity Type alvin/doff B socks   LB Dressing Goal OT LTG, [place] Level moderate assist (50% patient effort)   LB Dressing Goal OT LTG, Adaptive Equipment reacher;sock-aid

## 2017-07-06 NOTE — PLAN OF CARE
Problem: Patient Care Overview (Adult)  Goal: Plan of Care Review  Outcome: Ongoing (interventions implemented as appropriate)    07/06/17 1410   Coping/Psychosocial Response Interventions   Plan Of Care Reviewed With patient   Patient Care Overview   Progress progress toward functional goals as expected   Outcome Evaluation   Outcome Summary/Follow up Plan Pt presents with weakness and decreased independence with functional mobility. Pt ambulated 20ft with RW with CGA x2, dem decreased safety awareness, mild confusion. PT recommends d/c to inpatient rehab.         Problem: Inpatient Physical Therapy  Goal: Transfer Training Goal 1 LTG- PT  Outcome: Ongoing (interventions implemented as appropriate)    07/06/17 1410   Transfer Training PT LTG   Transfer Training PT LTG, Date Established 07/06/17   Transfer Training PT LTG, Time to Achieve 2 wks   Transfer Training PT LTG, Activity Type bed to chair /chair to bed;sit to stand/stand to sit   Transfer Training PT LTG, [place] Level supervision required   Transfer Training PT LTG, Assist Device walker, rolling   Transfer Training PT LTG, Outcome goal ongoing       Goal: Gait Training Goal LTG- PT  Outcome: Ongoing (interventions implemented as appropriate)    07/06/17 1410   Gait Training PT LTG   Gait Training Goal PT LTG, Date Established 07/06/17   Gait Training Goal PT LTG, Time to Achieve 2 wks   Gait Training Goal PT LTG, [place] Level supervision required   Gait Training Goal PT LTG, Assist Device walker, rolling   Gait Training Goal PT LTG, Distance to Achieve 200   Gait Training Goal PT LTG, Outcome goal ongoing       Goal: Dynamic Standing Balance Goal LTG- PT  Outcome: Ongoing (interventions implemented as appropriate)    07/06/17 1410   Dynamic Standing Balance PT LTG   Dynamic Standing Balance PT LTG, Date Established 07/06/17   Dynamic Standing Balance PT LTG, Time to Achieve 2 wks   Dynamic Standing Balance PT LTG, [place] Level contact  guard assist   Dynamic Standing Balance PT LTG, Outcome goal ongoing

## 2017-07-06 NOTE — PROGRESS NOTES
IM update note: Pt admitted after midnight by Dr. Aceves, H&P reviewed.    70 yo M, non-smoker, with history CAD, HTN, HLD, and DM2, who is noncompliant with medical therapy, reportedly due to financial difficulties, came in with worsening LE swelling and pain. + intermittent CP/dyspnea, but currently denies any. He reported a recent fall and c/o back pain/R rib cage pain.    Diagnostic data reviewed.    LE/scrotum are significantly edematous. Scattered erythematous lesions, consistent with bug bites, on both LE. Both great toes have ulcer on lateral plantar surface, but the L great toe is bulbous/vilaceous. Bilat 3rd digit also have ulcer at tip of toe.    A/P:  70 yo M with history and prsented as above. Already seen by cardiology and aggressively being diuresed. Echo and duple of LE pending. I will add doxy for cellulitis of LE. I suspect poss chronic osteomyelitis of L great toe, so I will get MRI for further evaluation-depending on results, I may need to get ID and Dr. Becker involve. Will also check TSH and A1C. Pt counseled about importance of medication compliance. SW consulted to assist with DC need/home safety eval and medication coverage due to financial difficulties. PT/OT to evaluate. Diabetic educator and nutritionist to see and provide education.  Cont with low dose SSI for now and qACHS accucheks.

## 2017-07-06 NOTE — THERAPY EVALUATION
Acute Care - Occupational Therapy Initial Evaluation  [facility]     Patient Name: Luis F Plummer  : 1945  MRN: [id number]  Today's Date: 2017  Onset of Illness/Injury or Date of Surgery Date: 17  Date of Referral to OT: 17  Referring Physician: FRANCO Irizarry    Admit Date: 2017       ICD-10-CM ICD-9-CM   1. Peripheral edema R60.9 782.3   2. Pleural effusion J90 511.9   3. Acute pulmonary edema J81.0 518.4   4. Acute on chronic systolic congestive heart failure I50.23 428.23     428.0   5. Hyperlipidemia LDL goal <70 E78.5 272.4   6. Acute on chronic systolic heart failure I50.23 428.23   7. Coronary artery disease of native artery of native heart with stable angina pectoris I25.118 414.01     413.9   8. Impaired mobility and ADLs Z74.09 799.89     Patient Active Problem List   Diagnosis   • Peripheral edema   • Acute on chronic systolic heart failure   • Coronary artery disease of native artery of native heart with stable angina pectoris   • Essential hypertension   • Hyperlipidemia LDL goal <70   • DM (diabetes mellitus)   • Bilateral leg ulcer   • CKD (chronic kidney disease)   • Bed bug bite   • Peripheral neuropathy   • Cellulitis-legs/feet with chronic ulcer     Past Medical History:   Diagnosis Date   • Diabetes mellitus      Past Surgical History:   Procedure Laterality Date   • BACK SURGERY            OT ASSESSMENT FLOWSHEET (last 72 hours)      OT Evaluation       17 1311 17 1310 17 1244 17 0300       Rehab Evaluation    Document Type evaluation  -JR (P)  evaluation  -SJ       Subjective Information no complaints;agree to therapy  -JR (P)  no complaints;agree to therapy  -SJ       Patient Effort, Rehab Treatment adequate  -JR (P)  adequate  -SJ       Symptoms Noted During/After Treatment none  -JR (P)  none  -SJ       General Information    Patient Profile Review yes  -JR (P)  yes  -SJ       Onset of Illness/Injury or Date of Surgery Date 17   -JR (P)  07/05/17  -SJ       Referring Physician FRANCO Irizarry  -JR (P)  FRANCO Irizarry  -SJ       General Observations  (P)  Pt supine in bed, awake, has tele. BLE's swollen, with multiple scabs  -SJ       Pertinent History Of Current Problem Pt admitted with recurrent B LE swelling which has worsened x 4 days. Pt found to have acute exacerbation of CHF.  -JR (P)  Pt admitted with recurrent BLE edema, found to have acute exacerbation of CHF  -SJ       Precautions/Limitations fall precautions;oxygen therapy device and L/min   contact precautions  -JR (P)  fall precautions   contact precautions, contamination (bed bugs)  -SJ       Prior Level of Function independent:;gait;transfer;bed mobility;ADL's   Was using cane prior to admit  -JR (P)  independent:;gait;transfer;bed mobility  -SJ       Equipment Currently Used at Home wheelchair;walker, rolling  -JR (P)  wheelchair;walker, rolling;cane, straight  -SJ none   Pt reports he has a wheelchair and walker but doesn't use  -BG none  -CJ     Plans/Goals Discussed With patient;agreed upon  -JR (P)  patient;agreed upon  -SJ       Risks Reviewed patient:;increased discomfort  -JR (P)  patient:;LOB;increased discomfort  -SJ       Benefits Reviewed patient:;improve function;increase independence  -JR (P)  patient:;improve function;increase independence;increase strength;increase balance;increase knowledge  -SJ       Barriers to Rehab none identified  -JR (P)  none identified  -SJ       Living Environment    Lives With alone  -JR (P)  alone  -SJ alone  -BG alone  -CJ     Living Arrangements house  -JR (P)  house  -SJ house  -BG house  -CJ     Home Accessibility stairs to enter home  -JR (P)  stairs to enter home  -SJ no concerns  -BG bed and bath on same level  -CJ     Number of Stairs to Enter Home 1  -JR (P)  1  -SJ       Stair Railings at Home    inside, present at both sides  -CJ     Type of Financial/Environmental Concern    none  -CJ     Transportation Available    car;family or friend will provide  -BG none  -CJ     Clinical Impression    Date of Referral to OT 07/06/17  -JR        OT Diagnosis Decreased independence with ADL's and mobility  -JR        Patient/Family Goals Statement Return home  -JR        Criteria for Skilled Therapeutic Interventions Met yes;treatment indicated  -JR        Rehab Potential good, to achieve stated therapy goals  -JR        Therapy Frequency daily  -JR        Anticipated Equipment Needs At Discharge bedside commode;tub bench  -JR        Anticipated Discharge Disposition inpatient rehabilitation facility  -JR        Functional Level Prior    Ambulation   0-->independent  -BG 0-->independent  -CJ     Transferring   0-->independent  -BG 0-->independent  -CJ     Toileting   0-->independent  -BG 0-->independent  -CJ     Bathing   0-->independent  -BG 0-->independent  -CJ     Dressing   0-->independent  -BG 0-->independent  -CJ     Eating   0-->independent  -BG 0-->independent  -CJ     Communication   0-->understands/communicates without difficulty  -BG 0-->understands/communicates without difficulty  -CJ     Swallowing    0-->swallows foods/liquids without difficulty  -CJ     Vital Signs    Pre Systolic BP Rehab 142  -JR        Pre Treatment Diastolic BP 98  -JR        Post Systolic BP Rehab 148  -JR        Post Treatment Diastolic BP 97  -JR        Pretreatment Heart Rate (beats/min) 73  -JR        Posttreatment Heart Rate (beats/min) 77  -JR        Pre SpO2 (%) 90  -JR        O2 Delivery Pre Treatment room air  -JR        Post SpO2 (%) 100  -JR        O2 Delivery Post Treatment room air  -JR        Pre Patient Position Supine  -JR        Intra Patient Position Standing  -JR        Post Patient Position Sitting  -JR        Pain Assessment    Pain Assessment 0-10  -JR        Pain Score 7  -JR        Post Pain Score 7  -JR        Pain Location Leg  -JR        Pain Orientation Right;Left  -JR        Pain Intervention(s) Ambulation/increased activity   -JR        Response to Interventions tolerated  -JR        Cognitive Assessment/Intervention    Current Cognitive/Communication Assessment impaired  -JR        Orientation Status oriented to;person;place  -JR        Follows Commands/Answers Questions 100% of the time;able to follow single-step instructions  -JR        Personal Safety mild impairment;decreased awareness, need for assist;decreased awareness, need for safety;decreased insight to deficits  -JR        ROM (Range of Motion)    General ROM no range of motion deficits identified  -JR        MMT (Manual Muscle Testing)    General MMT Assessment no strength deficits identified  -JR        Bed Mobility, Assessment/Treatment    Bed Mobility, Assistive Device head of bed elevated  -JR        Bed Mob, Supine to Sit, [place] supervision required  -JR        Transfer Assessment/Treatment    Transfers, Bed-Chair [place] moderate assist (50% patient effort);2 person assist required  -JR        Transfers, Bed-Chair-Bed, Assist Device other (see comments)   UE support x2  -JR        Transfers, Sit-Stand [place] contact guard assist;2 person assist required  -JR        Transfers, Stand-Sit [place] contact guard assist;2 person assist required  -JR        Transfers, Sit-Stand-Sit, Assist Device rolling walker  -JR        Transfer, Safety Issues balance decreased during turns;step length decreased;weight-shifting ability decreased  -JR        Transfer, Impairments strength decreased;impaired balance;postural control impaired  -JR        Transfer, Comment Pt intially leaning posterior and to the R in standing, transferred to the chair. Pt stood with walker with CGA with verbal cues for hand placement with transfer.  -JR        Functional Mobility    Functional Mobility- Ind. Level contact guard assist;2 person assist required  -JR        Functional Mobility- Device rolling walker  -JR        Functional Mobility-Distance (Feet) --   in room  -JR         Functional Mobility- Safety Issues balance decreased during turns;step length decreased;weight-shifting ability decreased  -JR        Lower Body Dressing Assessment/Training    LB Dressing Assess/Train, Clothing Type donning:;slipper socks  -JR        LB Dressing Assess/Train, Position supine  -JR        LB Dressing Assess/Train, [place] dependent (less than 25% patient effort)  -JR        LB Dressing Assess/Train, Impairments decreased flexibility;strength decreased  -JR        Motor Skills/Interventions    Additional Documentation Balance Skills Training (Group)  -JR        Balance Skills Training    Sitting-Level of Assistance Independent  -JR        Sitting-Balance Support Feet supported  -JR        Standing-Level of Assistance Contact guard  -JR        Static Standing Balance Support assistive device  -JR        Gait Balance-Level of Assistance Contact guard  -JR        Gait Balance Support assistive device  -JR        General Therapy Interventions    Planned Therapy Interventions ADL retraining;adaptive equipment training;balance training;bed mobility training;transfer training  -JR        Positioning and Restraints    Pre-Treatment Position in bed  -JR        Post Treatment Position chair  -JR        In Chair notified nsg;reclined;call light within reach;encouraged to call for assist;RLE elevated;LLE elevated;exit alarm on  -JR          User Key  (r) = Recorded By, (t) = Taken By, (c) = Cosigned By    Initials Name Effective Dates    LUCIUS Gutierrez, PT 06/19/15 -     JR Hilary Bolden, OT 06/22/15 -     RADAMES Ortega RN 06/16/16 -     BG Josephine Sheppard, MSW 07/18/16 -            Occupational Therapy Education     Title: PT OT SLP Therapies (Active)     Topic: Occupational Therapy (Active)     Point: ADL training (Active)    Description: Instruct learner(s) on proper safety adaptation and remediation techniques during self care or transfers.   Instruct in proper use of assistive devices.     Learning Progress Summary    Learner Readiness Method Response Comment Documented by Status   Patient Acceptance E NR Educated pt on appropriate transfer techniques, appropiate safety precautions, role of therapy and benefits of activity.  07/06/17 1359 Active                      User Key     Initials Effective Dates Name Provider Type Discipline     06/22/15 -  Hilary Bolden, OT Occupational Therapist OT                  OT Recommendation and Plan  Anticipated Equipment Needs At Discharge: bedside commode, tub bench  Anticipated Discharge Disposition: inpatient rehabilitation facility  Planned Therapy Interventions: ADL retraining, adaptive equipment training, balance training, bed mobility training, transfer training  Therapy Frequency: daily  Plan of Care Review  Plan Of Care Reviewed With: patient  Outcome Summary/Follow up Plan: OT initial eval completed. Pt presents with decreased independence with ADL's and mobility, decreased knowledge of AE. Pt would benefit from continued skilled OT services to assist in returning pt to his PLOF. Recommend inpatient rehab at d/c.          OT Goals       07/06/17 1401          Bed Mobility OT LTG    Bed Mobility OT LTG, Date Established 07/06/17  -      Bed Mobility OT LTG, Time to Achieve 1 wk  -JR      Bed Mobility OT LTG, Activity Type all bed mobility  -JR      Bed Mobility OT LTG, Hall Level conditional independence  -JR      Transfer Training OT LTG    Transfer Training OT LTG, Date Established 07/06/17  -      Transfer Training OT LTG, Time to Achieve 1 wk  -JR      Transfer Training OT LTG, Activity Type bed to chair /chair to bed  -JR      Transfer Training OT LTG, Hall Level supervision required  -JR      Transfer Training OT LTG, Assist Device other (see comments)   with appropriate AD  -JR      LB Dressing OT LTG    LB Dressing Goal OT LTG, Date Established 07/06/17  -      LB Dressing Goal OT LTG, Time to Achieve 1 wk  -JR      LB  Dressing Goal OT LTG, Activity Type alvin/doff B socks  -JR      LB Dressing Goal OT LTG, [place] Level moderate assist (50% patient effort)  -JR      LB Dressing Goal OT LTG, Adaptive Equipment reacher;sock-aid  -JR        User Key  (r) = Recorded By, (t) = Taken By, (c) = Cosigned By    Initials Name Provider Type    JR Hilary Bolden OT Occupational Therapist                Outcome Measures       07/06/17 1311          How much help from another is currently needed...    Putting on and taking off regular lower body clothing? 1  -JR      Bathing (including washing, rinsing, and drying) 2  -JR      Toileting (which includes using toilet bed pan or urinal) 2  -JR      Putting on and taking off regular upper body clothing 3  -JR      Taking care of personal grooming (such as brushing teeth) 4  -JR      Eating meals 4  -JR      Score 16  -JR      Functional Assessment    Outcome Measure Options AM-PAC 6 Clicks Daily Activity (OT)  -JR        User Key  (r) = Recorded By, (t) = Taken By, (c) = Cosigned By    Initials Name Provider Type    JR Hilary Bolden OT Occupational Therapist          Time Calculation:   OT Start Time: 1311    Therapy Charges for Today     Code Description Service Date Service Provider Modifiers Qty    90884048899  OT EVAL LOW COMPLEXITY 4 7/6/2017 Hilary Bolden OT GO 1               Hilary Bolden OT  7/6/2017

## 2017-07-06 NOTE — ED PROVIDER NOTES
Subjective   HPI Comments: Luis F Plummer is a 71 y.o.male with a history of DM and CHF who presents to the ED with c/o bilateral leg swelling that began three days ago. He reports that he has been unable to ambulate for the last two days secondary to bilateral lower extremity swelling. He also c/o worsening lower extremity erythema. He denies fever, chest pain, SOA, or any other acute complaints at this time.       Patient is a 71 y.o. male presenting with lower extremity pain.   History provided by:  Patient  Lower Extremity Issue   Location:  Leg  Time since incident:  3 days  Injury: no    Leg location:  L leg and R leg  Pain details:     Quality:  Aching    Radiates to:  Does not radiate    Severity:  Moderate    Onset quality:  Gradual    Duration:  3 days    Timing:  Constant    Progression:  Worsening  Chronicity:  New  Dislocation: no    Foreign body present:  No foreign bodies  Prior injury to area:  No  Relieved by:  Nothing  Worsened by:  Activity (ambulation)  Ineffective treatments:  None tried  Associated symptoms: no back pain, no fever and no neck pain        Review of Systems   Constitutional: Negative for chills and fever.   HENT: Negative for congestion, rhinorrhea, sore throat and trouble swallowing.    Respiratory: Negative for cough and shortness of breath.    Cardiovascular: Positive for leg swelling (bilateral). Negative for chest pain.   Gastrointestinal: Negative for abdominal pain, diarrhea, nausea and vomiting.   Musculoskeletal: Negative for back pain and neck pain.   Skin: Positive for color change (LE erythema).   Neurological: Negative for dizziness, weakness, numbness and headaches.   Psychiatric/Behavioral: Negative for confusion.   All other systems reviewed and are negative.      Past Medical History:   Diagnosis Date   • Diabetes mellitus        Allergies   Allergen Reactions   • Sulfa Antibiotics        Past Surgical History:   Procedure Laterality Date   • BACK SURGERY          History reviewed. No pertinent family history.    Social History     Social History   • Marital status: Single     Spouse name: N/A   • Number of children: N/A   • Years of education: N/A     Social History Main Topics   • Smoking status: Never Smoker   • Smokeless tobacco: None   • Alcohol use No   • Drug use: None   • Sexual activity: Not Asked     Other Topics Concern   • None     Social History Narrative   • None         Objective   Physical Exam   Constitutional: He is oriented to person, place, and time. He appears well-developed and well-nourished. No distress.   HENT:   Head: Normocephalic and atraumatic.   Nose: Nose normal.   Mouth/Throat: Oropharynx is clear and moist.   Eyes: Conjunctivae are normal. No scleral icterus.   Neck: Normal range of motion. Neck supple.   Cardiovascular: Normal rate, regular rhythm and normal heart sounds.    No murmur heard.  Pulmonary/Chest: Effort normal. No respiratory distress. He has no wheezes. He has rales (rales from the base of the lungs to mid-lung bilaterally). He exhibits no tenderness.   Abdominal: Soft. Bowel sounds are normal. He exhibits no mass. There is no tenderness. There is no rebound and no guarding.   Musculoskeletal: Normal range of motion. He exhibits edema (4+ bilateral edema extending to the proximal thighs). He exhibits no tenderness.   Glen developed on bilateral LE with excessive fluid buildup, mild weeping of the bilateral LE     Neurological: He is alert and oriented to person, place, and time.   Mentation baseline   Skin: Skin is warm and dry. There is erythema (erythema of the LLE in the leg and foot, reported chronic by the pt).   LLE not excessively warm. 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    Psychiatric: He has a normal mood and affect. His behavior is normal.   Nursing note and vitals reviewed.      Procedures         ED Course  ED Course     Recent Results (from the past 24 hour(s))    Comprehensive Metabolic Panel    Collection Time: 07/05/17  8:47 PM   Result Value Ref Range    Glucose 138 (H) 70 - 100 mg/dL    BUN 19 9 - 23 mg/dL    Creatinine 1.30 0.60 - 1.30 mg/dL    Sodium 140 132 - 146 mmol/L    Potassium 3.5 3.5 - 5.5 mmol/L    Chloride 101 99 - 109 mmol/L    CO2 31.0 20.0 - 31.0 mmol/L    Calcium 9.0 8.7 - 10.4 mg/dL    Total Protein 6.1 5.7 - 8.2 g/dL    Albumin 3.40 3.20 - 4.80 g/dL    ALT (SGPT) 28 7 - 40 U/L    AST (SGOT) 37 (H) 0 - 33 U/L    Alkaline Phosphatase 110 (H) 25 - 100 U/L    Total Bilirubin 1.0 0.3 - 1.2 mg/dL    eGFR Non African Amer 54 (L) >60 mL/min/1.73    Globulin 2.7 gm/dL    A/G Ratio 1.3 (L) 1.5 - 2.5 g/dL    BUN/Creatinine Ratio 14.6 7.0 - 25.0    Anion Gap 8.0 3.0 - 11.0 mmol/L   Lipase    Collection Time: 07/05/17  8:47 PM   Result Value Ref Range    Lipase 38 6 - 51 U/L   BNP    Collection Time: 07/05/17  8:47 PM   Result Value Ref Range    BNP 2432.0 (H) 0.0 - 100.0 pg/mL   Light Blue Top    Collection Time: 07/05/17  8:47 PM   Result Value Ref Range    Extra Tube hold for add-on    Green Top (Gel)    Collection Time: 07/05/17  8:47 PM   Result Value Ref Range    Extra Tube Hold for add-ons.    Lavender Top    Collection Time: 07/05/17  8:47 PM   Result Value Ref Range    Extra Tube hold for add-on    Gold Top - SST    Collection Time: 07/05/17  8:47 PM   Result Value Ref Range    Extra Tube Hold for add-ons.    CBC Auto Differential    Collection Time: 07/05/17  8:47 PM   Result Value Ref Range    WBC 9.35 3.50 - 10.80 10*3/mm3    RBC 4.60 4.20 - 5.76 10*6/mm3    Hemoglobin 13.0 (L) 13.1 - 17.5 g/dL    Hematocrit 41.1 38.9 - 50.9 %    MCV 89.3 80.0 - 99.0 fL    MCH 28.3 27.0 - 31.0 pg    MCHC 31.6 (L) 32.0 - 36.0 g/dL    RDW 16.4 (H) 11.3 - 14.5 %    RDW-SD 53.2 37.0 - 54.0 fl    MPV 9.9 6.0 - 12.0 fL    Platelets 235 150 - 450 10*3/mm3    Neutrophil % 81.1 (H) 41.0 - 71.0 %    Lymphocyte % 6.5 (L) 24.0 - 44.0 %    Monocyte % 10.7 0.0 - 12.0 %    Eosinophil  % 1.2 0.0 - 3.0 %    Basophil % 0.2 0.0 - 1.0 %    Immature Grans % 0.3 0.0 - 0.6 %    Neutrophils, Absolute 7.58 1.50 - 8.30 10*3/mm3    Lymphocytes, Absolute 0.61 0.60 - 4.80 10*3/mm3    Monocytes, Absolute 1.00 0.00 - 1.00 10*3/mm3    Eosinophils, Absolute 0.11 0.00 - 0.30 10*3/mm3    Basophils, Absolute 0.02 0.00 - 0.20 10*3/mm3    Immature Grans, Absolute 0.03 0.00 - 0.03 10*3/mm3   POC Troponin, Rapid    Collection Time: 07/05/17  8:53 PM   Result Value Ref Range    Troponin I 0.02 0.00 - 0.07 ng/mL     Note: In addition to lab results from this visit, the labs listed above may include labs taken at another facility or during a different encounter within the last 24 hours. Please correlate lab times with ED admission and discharge times for further clarification of the services performed during this visit.    XR Chest 1 View   Final Result   Abnormal   1. Mild patchy right basilar airspace opacities (atelectasis and/or    consolidation), decreased from prior study.   2. Large area of left basilar airspace opacity (atelectasis and/or    consolidation), similar to prior study.   3. Small right pleural effusion, decreased from prior study.   4. Large left pleural effusion, increased from prior study.   5. Moderate pulmonary edema, increased from prior study.      THIS DOCUMENT HAS BEEN ELECTRONICALLY SIGNED BY MICHELLE JAEGER MD        Vitals:    07/05/17 2200 07/05/17 2201 07/05/17 2230 07/05/17 2300   BP: 155/99  (!) 139/102 155/99   Pulse:  75 82 73   Resp:       Temp:       TempSrc:       SpO2:  98% 97% 98%   Weight:       Height:         Medications   sodium chloride 0.9 % flush 10 mL (not administered)   ethacrynic acid (EDECRIN) injection 50 mg (not administered)     ECG/EMG Results (last 24 hours)     Procedure Component Value Units Date/Time    ECG 12 Lead [[id number]] Collected:  07/05/17 2042     Updated:  07/05/17 2045                   MDM  Number of Diagnoses or Management Options  Acute on chronic  systolic congestive heart failure: new and requires workup  Acute pulmonary edema: new and requires workup  Peripheral edema: new and requires workup  Pleural effusion: new and requires workup  Diagnosis management comments: With significant peripheral edema that has developed over the last 4 days, also has associated pleural effusion and ulnar edema identified on chest x-ray.    Patient does not have any significant difficulty breathing, and oxygen saturations are normal.    Due to significant allergies in the past with associated sulfa allergy, will admit for ethacrynic acid administration for diuresis.     Discussed with Dr. Aceves who will admit.        Amount and/or Complexity of Data Reviewed  Clinical lab tests: ordered and reviewed  Tests in the radiology section of CPT®: ordered and reviewed  Decide to obtain previous medical records or to obtain history from someone other than the patient: yes  Review and summarize past medical records: yes  Discuss the patient with other providers: yes  Independent visualization of images, tracings, or specimens: yes    Patient Progress  Patient progress: stable      Final diagnoses:   Peripheral edema   Pleural effusion   Acute pulmonary edema   Acute on chronic systolic congestive heart failure       Documentation assistance provided by vini Tavares.  Information recorded by the vini was done at my direction and has been verified and validated by me.     Christine Tavares  07/05/17 2214       Christine Tavares  07/05/17 6645       David Flood MD  07/05/17 2865

## 2017-07-06 NOTE — PLAN OF CARE
Problem: Patient Care Overview (Adult)  Goal: Plan of Care Review  Outcome: Ongoing (interventions implemented as appropriate)    07/06/17 0511   Coping/Psychosocial Response Interventions   Plan Of Care Reviewed With patient   Patient Care Overview   Progress progress toward functional goals is gradual   Outcome Evaluation   Outcome Summary/Follow up Plan No acute overnight events. Continue to monitor.         Problem: Fall Risk (Adult)  Goal: Identify Related Risk Factors and Signs and Symptoms  Outcome: Ongoing (interventions implemented as appropriate)    07/06/17 0511   Fall Risk   Fall Risk: Related Risk Factors gait/mobility problems;history of falls;environment unfamiliar   Fall Risk: Signs and Symptoms presence of risk factors         Problem: Skin Integrity Impairment, Risk/Actual (Adult)  Goal: Identify Related Risk Factors and Signs and Symptoms  Outcome: Ongoing (interventions implemented as appropriate)    07/06/17 0511   Skin Integrity Impairment, Risk/Actual   Skin Integrity Impairment, Risk/Actual: Related Risk Factors edema;fluid/nutrition status;immobility;infection/disease process;sensory impairment;tissue perfusion impaired   Signs and Symptoms (Skin Integrity Impairment) edema;granulation tissue;rash         Problem: Cardiac Output, Decreased (Adult)  Goal: Identify Related Risk Factors and Signs and Symptoms  Outcome: Ongoing (interventions implemented as appropriate)    07/06/17 0511   Cardiac Output, Decreased   Cardiac Output, Decreased: Related Risk Factors cardiac muscle disease;disease process   Signs and Symptoms (Cardiac Output Decreased) edema;fatigue;weakness/activity intolerance;weight gain

## 2017-07-06 NOTE — CONSULTS
Inpatient Consult to Cardiology  Consult performed by: GORAN CONTRERAS  Consult ordered by: GAYATHRI GIBBS Cardiology at [facility]  Cardiovascular Consultation Note         Patient is a 71-year-old male with known coronary artery disease status post three-vessel CABG in 2015 that follows  in [place].  He presented to [facility] late last evening with complaints of increased swelling.  He has ruled in for acute exacerbation of heart failure with a elevated BNP greater than 2000 and chest x-ray showing bilateral pleural effusions with moderate pulmonary edema.  His troponins and EKGs have been normal ruling him out for an acute coronary syndrome.  He denies dyspnea, orthopnea, palpitations or syncope.  He does experience occasional chest pain but reports it is nothing new for him and has remained unchanged.  He reports bilateral leg pain and scrotal pain due to increased swelling. His legs have become so swollen he has extreme difficulty walking.  He lives home alone and is normally able to care for himself.   He has been intolerant to Lasix and Demadex in the past but reports Bumex works well for him.  It is worth noting he is in isolation for bed bugs. According to the patient he has not been taking his medications because he is unable to afford them.     Cardiac risk factors: Known CAD, advanced age, hypertension, hyperlipidemia    Past medical and surgical history, social and family history reviewed in EMR.    REVIEW OF SYSTEMS:   H&P ROS reviewed and pertinent CV ROS as noted in HPI.         Vital Sign Min/Max for last 24 hours  Temp  Min: 97.7 °F (36.5 °C)  Max: 98.2 °F (36.8 °C)   BP  Min: 128/89  Max: 168/107   Pulse  Min: 73  Max: 85   Resp  Min: 16  Max: 20   SpO2  Min: 95 %  Max: 99 %   No Data Recorded      Intake/Output Summary (Last 24 hours) at 07/06/17 1103  Last data filed at 07/06/17 0856   Gross per 24 hour   Intake                0 ml      Output              300 ml   Net             -300 ml           Physical Exam   Constitutional: He is oriented to person, place, and time. He appears well-developed and well-nourished.   HENT:   Head: Normocephalic and atraumatic.   Eyes: Pupils are equal, round, and reactive to light. No scleral icterus.   Neck: No JVD present. Carotid bruit is not present. No thyromegaly present.   Cardiovascular: Normal rate, regular rhythm, S1 normal and S2 normal.  Exam reveals no gallop.    No murmur heard.  Pulmonary/Chest: Effort normal and breath sounds normal.   Abdominal: Soft. There is no tenderness.   Musculoskeletal: He exhibits edema.   Neurological: He is alert and oriented to person, place, and time.   Skin: Skin is warm and dry. No cyanosis. Nails show no clubbing.        Psychiatric: He has a normal mood and affect. His behavior is normal.        EKG: Normal sinus rhythm    Lab Review:   Labs reviewed in the electronic medical record.  Pertinent findings include:  Lab Results   Component Value Date    GLUCOSE 138 (H) 07/05/2017    BUN 19 07/05/2017    CREATININE 1.30 07/05/2017    EGFRIFNONA 54 (L) 07/05/2017    BCR 14.6 07/05/2017    K 3.5 07/05/2017    CO2 31.0 07/05/2017    CALCIUM 9.0 07/05/2017    PROTENTOTREF 5.2 (L) 01/28/2015    ALBUMIN 3.40 07/05/2017    LABIL2 1.3 (L) 07/05/2017    AST 37 (H) 07/05/2017    ALT 28 07/05/2017     Lab Results   Component Value Date    WBC 9.35 07/05/2017    HGB 13.0 (L) 07/05/2017    HCT 41.1 07/05/2017    MCV 89.3 07/05/2017     07/05/2017              Hospital Problem List     * (Principal)Acute on chronic systolic heart failure    Overview Signed 7/6/2017 11:09 AM by FRANCO Wood     · NYHA Class II         Peripheral edema    Coronary artery disease involving native coronary artery of native heart    Overview Addendum 7/6/2017 11:06 AM by FRANCO Wood     · Cardiac catheterization at [facility] (01/26/2015):  Multivessel disease.  Data deficit   · Coronary artery bypass with Dr. Rodríguez (01/27/2015): LIMA to LAD.  SVG to OM.  SVG to PDA.  LVEF 20-25%.  · Primary cardiologist is Dr. Bautista         Bilateral leg ulcer    DM (diabetes mellitus)    CKD (chronic kidney disease)    Essential hypertension    Hyperlipidemia LDL goal <70    Overview Signed 7/6/2017 11:09 AM by FRANCO Wood     · High-intensity statin therapy indicated given presence coronary artery disease  ·              Mr. Plummer appears to be in an acute exacerbation of heart failure that is fairly well compensated.  He does have extreme pitting edema in his bilateral lower extremities with ulcers as well as scrotal edema.  We will provide gentle diuresis with Bumex 1 mg twice a day.  We will also obtain an echocardiogram and bilateral lower extremity duplex study.  He is currently not on any statin, aspirin or beta blocker and we will initiate that given his known coronary artery disease and last known EF was 20-25%.     · Bumex 1 mg twice a day  · Obtain echocardiogram and bilateral lower extremity duplex  · Start Lipitor 20 mg daily  · Lipid panel in the a.m.  · Elevate scrotum  · Beta blocker and aspirin  · Case Management and  to assist with medications  This is Dr. Fam Prescott-I personally reviewed the patient's records including looking at his chest x-ray which shows cardiomegaly and CHF in the left pleural effusion.  I reviewed all his laboratory data which showed a BNP greater than 2000 but normal renal function.  I personally saw the patient and got in history of present illness and performed a physical examination discussed the case with the patient and the physician extender  According to the nurse the patient reported to her that he's not been taking his medications.  I asked him that he states because he can afford it was caused him $700 a month.  He states he's had problems with edema for a while but this is worse  it's been.  He's not been active because of his legs.  He denied dyspnea on exertion he denied PND orthopnea.  Severe scrotal edema as well as the lower extremity edema.  He denied any swelling abdomen  Physical exam-HEENT EOMI/JVP/dentition poor  Cardiovascular-regular rate and rhythm with a grade 3-4 per 6 systolic ejection murmur louder at the apex.  He also has 3+ pitting edema of his lower extremity has positive edema of his scrotum  Respiratory-he is equal bilateral symmetrical expansion with bilateral crackles and decreased breath sounds left base consistent with an effusion  Skin-he has 2+ to 3+ pitting edema of the lower extremities to palpation/he has some mild erythema  Neuro-cranial nerves II through XII are grossly normal he moves all 4 extremities  Musculoskeletal-he has loss of muscle mass between his index finger and his  His point time I agree with the plan above to diurese and start standard of care medication-to include diuretics and vasodilators and Coreg  There's been prior documentation of bad EF and normal judgment a follow-up echocardiogram but his EF is bad enough for AICD implantation  Of note also the patient has not seen a cardiologist recently  Maryan GAMEZ dictating as scribe for Dr. Jimmy Prescott    I, Jimmy Prescott MD, personally performed the services described in this documentation as scribed by the above named individual in my presence, and it is both accurate and complete.  07/06/17 11:38 AM

## 2017-07-06 NOTE — PROGRESS NOTES
Discharge Planning Assessment  [facility]     Patient Name: Luis F Plummer  MRN: [id number]  Today's Date: 7/6/2017    Admit Date: 7/5/2017          Discharge Needs Assessment       07/06/17 1244    Living Environment    Lives With alone    Living Arrangements house    Home Accessibility no concerns    Transportation Available car;family or friend will provide    Living Environment    Provides Primary Care For no one    Discharge Needs Assessment    Anticipated Changes Related to Illness none    Equipment Currently Used at Home none   Pt reports he has a wheelchair and walker but doesn't use    Equipment Needed After Discharge none            Discharge Plan       07/06/17 1245    Case Management/Social Work Plan    Plan To be determined    Patient/Family In Agreement With Plan yes    Additional Comments Spoke with pt at bedside. Pt reports he lives alone but sometimes he has friends that stay with him. Pt reports he has no concerns about his home and does not have bed bugs. Pt reports he has been able to take care of himself, is still independent in ADL's and IADL's. Consult received for possible rehab and questions about not being compliant or unable to afford medications. PT an dOT has been ordered and will await recommendations for rehab. SW can try to assist if pt unable to afford some of his meds. CM/SW will be following and assisting with discharge and ss needs.     Final Note    Final Note CM/SW following        Discharge Placement     No information found        Expected Discharge Date and Time     Expected Discharge Date Expected Discharge Time    Jul 8, 2017               Demographic Summary       07/06/17 1244    Referral Information    Reason For Consult discharge planning            Functional Status       07/06/17 1244    Functional Status Prior    Ambulation 0-->independent    Transferring 0-->independent    Toileting 0-->independent    Bathing 0-->independent    Dressing 0-->independent    Eating  0-->independent    Communication 0-->understands/communicates without difficulty    IADL    Medications independent    Meal Preparation independent    Housekeeping independent    Laundry independent    Shopping independent    Oral Care independent            Psychosocial     None            Abuse/Neglect     None            Legal     None            Substance Abuse     None            Patient Forms     None          Josephine Sheppard MSW

## 2017-07-06 NOTE — ED NOTES
Brenda, neighbor to the PT, was notified on the status of the PT at the request of the patient.     Loretta Guajardo  07/06/17 0043

## 2017-07-06 NOTE — H&P
"    [facility] Medicine Services  HISTORY AND PHYSICAL    Primary Care Physician: No Known Provider    Subjective     Chief Complaint:  edema    History of Present Illness:     70 y/o pleasant male w/ poor insight into medical problems who is here w/ recurrent ble swelling which he states has worsened for last 4 days; states he is allergic to diuretics and refused lasix bc makes him burn when he pees; states torsemide causes ble ulcerations.  States he is able to take bumex but does not have supply of this at home but states he would not routinely take it if he did.  No chest pain/dyspnea.      Review of Systems   Otherwise complete ROS performed and negative except as mentioned in the HPI.    Past Medical History:   Diagnosis Date   • Diabetes mellitus        Past Surgical History:   Procedure Laterality Date   • BACK SURGERY         History reviewed. No pertinent family history.    Social History     Social History   • Marital status: Single     Spouse name: N/A   • Number of children: N/A   • Years of education: N/A     Occupational History   • Not on file.     Social History Main Topics   • Smoking status: Never Smoker   • Smokeless tobacco: Not on file   • Alcohol use No   • Drug use: Not on file   • Sexual activity: Not on file     Other Topics Concern   • Not on file     Social History Narrative   • No narrative on file       Medications:  Prescriptions Prior to Admission   Medication Sig Dispense Refill Last Dose   • aspirin 81 MG chewable tablet Chew 81 mg Daily.      • traZODone (DESYREL) 50 MG tablet Take 50 mg by mouth.          Allergies:  Allergies   Allergen Reactions   • Sulfa Antibiotics          Objective     Physical Exam:  Vital Signs: BP (!) 167/119 (BP Location: Right arm, Patient Position: Lying)  Pulse 77  Temp 98.1 °F (36.7 °C) (Oral)   Resp 16  Ht 71\" (180.3 cm)  Wt 170 lb (77.1 kg)  SpO2 98%  BMI 23.71 kg/m2  Physical Exam  Gen; alert, oriented, nad  Heent; " "perrla, eomi  Cv; rrr, no mrg  L; ctab, no wheeze/crackles  Abd; soft, +bs, ntnd  Ext; 3+ ble edema w/ multiple bug bites/scabs and old and current ulcerations; erythematous ble  Skin; see above  Neuro; grossly intact  Psych; mood and affect appropriate      Results Reviewed:    Results from last 7 days  Lab Units 07/05/17  2047   WBC 10*3/mm3 9.35   HEMOGLOBIN g/dL 13.0*   PLATELETS 10*3/mm3 235       Results from last 7 days  Lab Units 07/05/17 2047   SODIUM mmol/L 140   POTASSIUM mmol/L 3.5   CO2 mmol/L 31.0   CREATININE mg/dL 1.30   GLUCOSE mg/dL 138*   CALCIUM mg/dL 9.0       I have personally reviewed and interpreted available lab data, radiology studies and ECG obtained at time of admission.     Assessment / Plan     Assessment/Problem List:   Hospital Problem List     Peripheral edema    Chronic systolic CHF (congestive heart failure)    CAD (coronary artery disease)    HTN (hypertension)    Hyperlipidemia    DM (diabetes mellitus)    Bilateral leg ulcer    CKD (chronic kidney disease)            Plan:  1. BLE edema w/ chronic systolic CHF: pt received edecrine in ER; will change to bumex in a.m. As pt states he has tolerated this in past and try to encourage him to adhere to dietary changes as well as instructed on proper diuretic use to avoid hospitalization.  Obtain echo and cards consult as well.  2. BLE leg ulcerations; some appear to be from bug bites/scabs; others likely from extensive edema; wound care to see this a.m.  3. Cad/cabg 3v; stable  4. Htn; stable  5. Hyperlipidemia; stable  6. DM; ssi      DVT prophylaxis: heparin  Code Status: conditional (\"5 minutes of chest compressions\" but no mechanical ventilation)  Admission Status: Patient will be admitted to  INPATIENT status due to the need for care which can only be reasonably provided in an hospital setting such as aggressive/expedited ancillary services and/or consultation services and the necessity for IV medications, close physician " monitoring and/or the possible need for procedures.  In such, I feel patient’s risk for adverse outcomes and need for care warrant INPATIENT evaluation and predict the patient’s care encounter to likely last beyond 2 midnights.    Krista Aceves MD 07/06/17 4:42 AM

## 2017-07-07 ENCOUNTER — APPOINTMENT (OUTPATIENT)
Dept: CARDIOLOGY | Facility: HOSPITAL | Age: 72
End: 2017-07-07

## 2017-07-07 ENCOUNTER — APPOINTMENT (OUTPATIENT)
Dept: MRI IMAGING | Facility: HOSPITAL | Age: 72
End: 2017-07-07

## 2017-07-07 LAB
ANION GAP SERPL CALCULATED.3IONS-SCNC: 9 MMOL/L (ref 3–11)
ARTICHOKE IGE QN: 137 MG/DL (ref 0–130)
BH CV ECHO MEAS - AO MAX PG (FULL): 2.8 MMHG
BH CV ECHO MEAS - AO MAX PG: 4.2 MMHG
BH CV ECHO MEAS - AO MEAN PG (FULL): 1.9 MMHG
BH CV ECHO MEAS - AO MEAN PG: 2.6 MMHG
BH CV ECHO MEAS - AO ROOT AREA (BSA CORRECTED): 1.6
BH CV ECHO MEAS - AO ROOT AREA: 8.1 CM^2
BH CV ECHO MEAS - AO ROOT DIAM: 3.2 CM
BH CV ECHO MEAS - AO V2 MAX: 102.6 CM/SEC
BH CV ECHO MEAS - AO V2 MEAN: 76.8 CM/SEC
BH CV ECHO MEAS - AO V2 VTI: 18.3 CM
BH CV ECHO MEAS - AVA(I,A): 1.3 CM^2
BH CV ECHO MEAS - AVA(I,D): 1.3 CM^2
BH CV ECHO MEAS - AVA(V,A): 1.6 CM^2
BH CV ECHO MEAS - AVA(V,D): 1.6 CM^2
BH CV ECHO MEAS - BSA(HAYCOCK): 2 M^2
BH CV ECHO MEAS - BSA: 2 M^2
BH CV ECHO MEAS - BZI_BMI: 23.7 KILOGRAMS/M^2
BH CV ECHO MEAS - BZI_METRIC_HEIGHT: 180.3 CM
BH CV ECHO MEAS - BZI_METRIC_WEIGHT: 77.1 KG
BH CV ECHO MEAS - CONTRAST EF (2CH): 4.8 ML/M^2
BH CV ECHO MEAS - CONTRAST EF 4CH: 21.8 ML/M^2
BH CV ECHO MEAS - EDV(CUBED): 153.5 ML
BH CV ECHO MEAS - EDV(MOD-SP2): 145 ML
BH CV ECHO MEAS - EDV(MOD-SP4): 142 ML
BH CV ECHO MEAS - EDV(TEICH): 138.6 ML
BH CV ECHO MEAS - EF(CUBED): 30.3 %
BH CV ECHO MEAS - EF(MOD-SP2): 4.8 %
BH CV ECHO MEAS - EF(MOD-SP4): 21.8 %
BH CV ECHO MEAS - EF(TEICH): 24.3 %
BH CV ECHO MEAS - ESV(CUBED): 107.1 ML
BH CV ECHO MEAS - ESV(MOD-SP2): 138 ML
BH CV ECHO MEAS - ESV(MOD-SP4): 111 ML
BH CV ECHO MEAS - ESV(TEICH): 104.8 ML
BH CV ECHO MEAS - FS: 11.3 %
BH CV ECHO MEAS - IVS/LVPW: 0.91
BH CV ECHO MEAS - IVSD: 0.9 CM
BH CV ECHO MEAS - LA DIMENSION: 4.4 CM
BH CV ECHO MEAS - LA/AO: 1.4
BH CV ECHO MEAS - LV DIASTOLIC VOL/BSA (35-75): 72.1 ML/M^2
BH CV ECHO MEAS - LV MASS(C)D: 177.1 GRAMS
BH CV ECHO MEAS - LV MASS(C)DI: 90 GRAMS/M^2
BH CV ECHO MEAS - LV MAX PG: 1.4 MMHG
BH CV ECHO MEAS - LV MEAN PG: 0.72 MMHG
BH CV ECHO MEAS - LV SYSTOLIC VOL/BSA (12-30): 56.4 ML/M^2
BH CV ECHO MEAS - LV V1 MAX: 60 CM/SEC
BH CV ECHO MEAS - LV V1 MEAN: 39.1 CM/SEC
BH CV ECHO MEAS - LV V1 VTI: 8.7 CM
BH CV ECHO MEAS - LVIDD: 5.4 CM
BH CV ECHO MEAS - LVIDS: 4.7 CM
BH CV ECHO MEAS - LVLD AP2: 8.3 CM
BH CV ECHO MEAS - LVLD AP4: 9.1 CM
BH CV ECHO MEAS - LVLS AP2: 8.3 CM
BH CV ECHO MEAS - LVLS AP4: 8.6 CM
BH CV ECHO MEAS - LVOT AREA (M): 2.8 CM^2
BH CV ECHO MEAS - LVOT AREA: 2.8 CM^2
BH CV ECHO MEAS - LVOT DIAM: 1.9 CM
BH CV ECHO MEAS - LVPWD: 0.9 CM
BH CV ECHO MEAS - MV DEC TIME: 0.17 SEC
BH CV ECHO MEAS - MV E MAX VEL: 110.1 CM/SEC
BH CV ECHO MEAS - PA ACC SLOPE: 459 CM/SEC^2
BH CV ECHO MEAS - PA ACC TIME: 0.08 SEC
BH CV ECHO MEAS - PA PR(ACCEL): 43.3 MMHG
BH CV ECHO MEAS - PULM DIAS VEL: 81.8 CM/SEC
BH CV ECHO MEAS - PULM S/D: 0.35
BH CV ECHO MEAS - PULM SYS VEL: 29 CM/SEC
BH CV ECHO MEAS - RVDD: 4.3 CM
BH CV ECHO MEAS - RVSP: 39 MMHG
BH CV ECHO MEAS - SI(AO): 75.6 ML/M^2
BH CV ECHO MEAS - SI(CUBED): 23.6 ML/M^2
BH CV ECHO MEAS - SI(LVOT): 12.2 ML/M^2
BH CV ECHO MEAS - SI(MOD-SP2): 3.6 ML/M^2
BH CV ECHO MEAS - SI(MOD-SP4): 15.8 ML/M^2
BH CV ECHO MEAS - SI(TEICH): 17.1 ML/M^2
BH CV ECHO MEAS - SV(AO): 148.8 ML
BH CV ECHO MEAS - SV(CUBED): 46.5 ML
BH CV ECHO MEAS - SV(LVOT): 24.1 ML
BH CV ECHO MEAS - SV(MOD-SP2): 7 ML
BH CV ECHO MEAS - SV(MOD-SP4): 31 ML
BH CV ECHO MEAS - SV(TEICH): 33.7 ML
BH CV ECHO MEAS - TAPSE (>1.6): 1 CM2
BH CV ECHO MEAS - TR MAX VEL: 311 CM/SEC
BH CV VAS BP LEFT ARM: NORMAL MMHG
BH CV XLRA - RV BASE: 5.6 CM
BH CV XLRA - RV LENGTH: 7.5 CM
BH CV XLRA - RV MID: 4.7 CM
BH CV XLRA - TDI S': 8.39 CM/SEC
BUN BLD-MCNC: 17 MG/DL (ref 9–23)
BUN/CREAT SERPL: 13.1 (ref 7–25)
CALCIUM SPEC-SCNC: 8.9 MG/DL (ref 8.7–10.4)
CHLORIDE SERPL-SCNC: 101 MMOL/L (ref 99–109)
CHOLEST SERPL-MCNC: 199 MG/DL (ref 0–200)
CO2 SERPL-SCNC: 27 MMOL/L (ref 20–31)
CREAT BLD-MCNC: 1.3 MG/DL (ref 0.6–1.3)
E/E' RATIO: 21.4
GFR SERPL CREATININE-BSD FRML MDRD: 54 ML/MIN/1.73
GLUCOSE BLD-MCNC: 154 MG/DL (ref 70–100)
GLUCOSE BLDC GLUCOMTR-MCNC: 129 MG/DL (ref 70–130)
GLUCOSE BLDC GLUCOMTR-MCNC: 135 MG/DL (ref 70–130)
GLUCOSE BLDC GLUCOMTR-MCNC: 177 MG/DL (ref 70–130)
GLUCOSE BLDC GLUCOMTR-MCNC: 186 MG/DL (ref 70–130)
HBA1C MFR BLD: 7.9 % (ref 4.8–5.6)
HDLC SERPL-MCNC: 53 MG/DL (ref 40–60)
LEFT ATRIUM VOLUME INDEX: 38.1 ML/M2
LV EF 2D ECHO EST: 20 %
MACROSCOPIC EXAM: NORMAL
POTASSIUM BLD-SCNC: 3.2 MMOL/L (ref 3.5–5.5)
SODIUM BLD-SCNC: 137 MMOL/L (ref 132–146)
T4 FREE SERPL-MCNC: 1.08 NG/DL (ref 0.89–1.76)
TRIGL SERPL-MCNC: 67 MG/DL (ref 0–150)
TSH SERPL DL<=0.05 MIU/L-ACNC: 5.53 MIU/ML (ref 0.35–5.35)

## 2017-07-07 PROCEDURE — 87169 MACROSCOPIC EXAM PARASITE: CPT | Performed by: HOSPITALIST

## 2017-07-07 PROCEDURE — 93306 TTE W/DOPPLER COMPLETE: CPT

## 2017-07-07 PROCEDURE — 73720 MRI LWR EXTREMITY W/O&W/DYE: CPT

## 2017-07-07 PROCEDURE — 80048 BASIC METABOLIC PNL TOTAL CA: CPT | Performed by: HOSPITALIST

## 2017-07-07 PROCEDURE — 25010000002 HEPARIN (PORCINE) PER 1000 UNITS: Performed by: NURSE PRACTITIONER

## 2017-07-07 PROCEDURE — 0 GADOBENATE DIMEGLUMINE 529 MG/ML SOLUTION: Performed by: HOSPITALIST

## 2017-07-07 PROCEDURE — 80061 LIPID PANEL: CPT | Performed by: NURSE PRACTITIONER

## 2017-07-07 PROCEDURE — 99233 SBSQ HOSP IP/OBS HIGH 50: CPT | Performed by: HOSPITALIST

## 2017-07-07 PROCEDURE — 84439 ASSAY OF FREE THYROXINE: CPT | Performed by: HOSPITALIST

## 2017-07-07 PROCEDURE — 83036 HEMOGLOBIN GLYCOSYLATED A1C: CPT | Performed by: HOSPITALIST

## 2017-07-07 PROCEDURE — 93306 TTE W/DOPPLER COMPLETE: CPT | Performed by: INTERNAL MEDICINE

## 2017-07-07 PROCEDURE — A9577 INJ MULTIHANCE: HCPCS | Performed by: HOSPITALIST

## 2017-07-07 PROCEDURE — 82962 GLUCOSE BLOOD TEST: CPT

## 2017-07-07 PROCEDURE — 84443 ASSAY THYROID STIM HORMONE: CPT | Performed by: HOSPITALIST

## 2017-07-07 RX ORDER — PANTOPRAZOLE SODIUM 40 MG/1
40 TABLET, DELAYED RELEASE ORAL
Status: DISCONTINUED | OUTPATIENT
Start: 2017-07-07 | End: 2017-07-21 | Stop reason: HOSPADM

## 2017-07-07 RX ORDER — LEVOTHYROXINE SODIUM 0.05 MG/1
50 TABLET ORAL
Status: DISCONTINUED | OUTPATIENT
Start: 2017-07-07 | End: 2017-07-21 | Stop reason: HOSPADM

## 2017-07-07 RX ORDER — NAPROXEN 500 MG/1
500 TABLET ORAL 2 TIMES DAILY WITH MEALS
Status: DISCONTINUED | OUTPATIENT
Start: 2017-07-07 | End: 2017-07-08

## 2017-07-07 RX ADMIN — CARVEDILOL 3.12 MG: 3.12 TABLET, FILM COATED ORAL at 21:30

## 2017-07-07 RX ADMIN — HEPARIN SODIUM 5000 UNITS: 5000 INJECTION, SOLUTION INTRAVENOUS; SUBCUTANEOUS at 21:29

## 2017-07-07 RX ADMIN — LOSARTAN POTASSIUM 50 MG: 50 TABLET, FILM COATED ORAL at 09:50

## 2017-07-07 RX ADMIN — PANTOPRAZOLE SODIUM 40 MG: 40 TABLET, DELAYED RELEASE ORAL at 20:00

## 2017-07-07 RX ADMIN — NYSTATIN: 100000 POWDER TOPICAL at 21:32

## 2017-07-07 RX ADMIN — LEVOTHYROXINE SODIUM 50 MCG: 50 TABLET ORAL at 19:59

## 2017-07-07 RX ADMIN — NAPROXEN 500 MG: 500 TABLET ORAL at 20:00

## 2017-07-07 RX ADMIN — ATORVASTATIN CALCIUM 20 MG: 20 TABLET, FILM COATED ORAL at 21:30

## 2017-07-07 RX ADMIN — HEPARIN SODIUM 5000 UNITS: 5000 INJECTION, SOLUTION INTRAVENOUS; SUBCUTANEOUS at 09:50

## 2017-07-07 RX ADMIN — BUMETANIDE 1 MG: 0.25 INJECTION INTRAMUSCULAR; INTRAVENOUS at 20:09

## 2017-07-07 RX ADMIN — CARVEDILOL 3.12 MG: 3.12 TABLET, FILM COATED ORAL at 09:50

## 2017-07-07 RX ADMIN — NYSTATIN: 100000 POWDER TOPICAL at 09:50

## 2017-07-07 RX ADMIN — GADOBENATE DIMEGLUMINE 15 ML: 529 INJECTION, SOLUTION INTRAVENOUS at 06:45

## 2017-07-07 RX ADMIN — BUMETANIDE 1 MG: 0.25 INJECTION INTRAMUSCULAR; INTRAVENOUS at 09:50

## 2017-07-07 RX ADMIN — DOXYCYCLINE 100 MG: 100 INJECTION, POWDER, LYOPHILIZED, FOR SOLUTION INTRAVENOUS at 02:10

## 2017-07-07 RX ADMIN — DOXYCYCLINE 100 MG: 100 INJECTION, POWDER, LYOPHILIZED, FOR SOLUTION INTRAVENOUS at 13:46

## 2017-07-07 RX ADMIN — INSULIN LISPRO 2 UNITS: 100 INJECTION, SOLUTION INTRAVENOUS; SUBCUTANEOUS at 19:59

## 2017-07-07 RX ADMIN — ASPIRIN 81 MG: 81 TABLET, COATED ORAL at 09:50

## 2017-07-07 NOTE — NURSING NOTE
Pt. Referred for Phase II Cardiac Rehab. Patient to follow up with [facility] after discharge.  [facility] to refer pt back to Cardiac Rehab when appropriate.  Please contact staff if we can be of further assistance with this patient.

## 2017-07-07 NOTE — PROGRESS NOTES
Adult Nutrition  Assessment/PES    Patient Name:  Luis F Plummer  YOB: 1945  MRN: [id number]  Admit Date:  7/5/2017    Assessment Date:  7/7/2017        Reason for Assessment       07/07/17 1740    Reason for Assessment    Reason For Assessment/Visit follow up protocol      07/07/17 1735    Reason for Assessment    Reason For Assessment/Visit education   pt asleep at 4:25pm when RD attempted to educate pt on DM/HF diet.  Diet education written materials left in room with pt . Noted that pt will follow up with HF clinic and has been referred to cardiac rehab; pt will have access to diet edu at each of these places as well.    Time Spent (min) 10   Also noted plans for pt to go to rehab at [facility].                        Nutrition Prescription Ordered       07/07/17 1741    Nutrition Prescription PO    Supplement Boost Glucose Control    Supplement Frequency 2 times a day      07/07/17 1740    Nutrition Prescription PO    Current PO Diet Regular    Common Modifiers Cardiac;Consistent Carbohydrate            Evaluation of Received Nutrient/Fluid Intake       07/07/17 1740    PO Evaluation    Number of Meals 5    % PO Intake 100              Problem/Interventions:          Problem 2       07/07/17 1741    Nutrition Diagnoses Problem 2    Problem 2 Knowledge Deficit    Etiology (related to) --   HF and DM    Signs/Symptoms (evidenced by) Potential Information Deficit                  Intervention Goal       07/07/17 1741    Intervention Goal    PO Maintain intake            Nutrition Intervention       07/07/17 1741    Nutrition Intervention    RD/Tech Action Follow Tx progress; supplement provided              Education/Evaluation       07/07/17 1741    Monitor/Evaluation    Monitor Per protocol      07/07/17 1737    Education    Education --   provide verbal teaching as pt available/alert.        Comments:      Electronically signed by:  Yris Cat, MS,RD,LD  07/07/17 5:42 PM

## 2017-07-07 NOTE — PLAN OF CARE
Problem: Patient Care Overview (Adult)  Goal: Plan of Care Review  Outcome: Ongoing (interventions implemented as appropriate)    07/06/17 1410 07/06/17 2055   Coping/Psychosocial Response Interventions   Plan Of Care Reviewed With --  patient   Patient Care Overview   Progress progress toward functional goals as expected --          Problem: Skin Integrity Impairment, Risk/Actual (Adult)  Goal: Identify Related Risk Factors and Signs and Symptoms  Outcome: Ongoing (interventions implemented as appropriate)    07/06/17 0511   Skin Integrity Impairment, Risk/Actual   Skin Integrity Impairment, Risk/Actual: Related Risk Factors edema;fluid/nutrition status;immobility;infection/disease process;sensory impairment;tissue perfusion impaired   Signs and Symptoms (Skin Integrity Impairment) edema;granulation tissue;rash         Problem: Cardiac Output, Decreased (Adult)  Goal: Identify Related Risk Factors and Signs and Symptoms  Outcome: Ongoing (interventions implemented as appropriate)    07/06/17 0511   Cardiac Output, Decreased   Cardiac Output, Decreased: Related Risk Factors cardiac muscle disease;disease process   Signs and Symptoms (Cardiac Output Decreased) edema;fatigue;weakness/activity intolerance;weight gain

## 2017-07-07 NOTE — PROGRESS NOTES
[facility]  Progress Note       LOS: 1 day   Patient Care Team:  No Known Provider as PCP - General    Chief Complaint:  Follow up CHF    Subjective     States that he doesn't feel much different than yesterday. He is sitting in bed. Appears comfortable. Says he has CP all of the time. No current SOB. He has confirmed bed bugs. States that he has not urinated very much although 24 Hour urine is charted 2400 ml. Also, he states that he has been here one week, he was admitted on 7/5/17.       Review of Systems:   Pertinent positives in HPI, all others reviewed and negative.      Objective       Current Facility-Administered Medications:   •  acetaminophen (TYLENOL) tablet 650 mg, 650 mg, Oral, Q4H PRN, Gris Juan C V, APRN  •  aspirin EC tablet 81 mg, 81 mg, Oral, Daily, Maryanmitzi Alonso, APRN, 81 mg at 07/06/17 1543  •  atorvastatin (LIPITOR) tablet 20 mg, 20 mg, Oral, Nightly, Maryan Alonso, APRN, 20 mg at 07/06/17 2132  •  bumetanide (BUMEX) injection 1 mg, 1 mg, Intravenous, BID, Maryan Alonso, APRN, 1 mg at 07/06/17 1833  •  calcium carbonate (TUMS) chewable tablet 500 mg (200 mg elemental), 2 tablet, Oral, BID PRN, Grissa Iirzarry V, APRN  •  carvedilol (COREG) tablet 3.125 mg, 3.125 mg, Oral, Q12H, Maryanoksana Alonso, APRN, 3.125 mg at 07/06/17 2133  •  dextrose (D50W) solution 25 g, 25 g, Intravenous, Q15 Min PRN, Monet Kent MD  •  dextrose (GLUTOSE) oral gel 15 g, 15 g, Oral, Q15 Min PRN, Monet Kent MD  •  doxycycline (VIBRAMYCIN) 100 mg/100 mL 0.9% NS MBP, 100 mg, Intravenous, Q12H, Monet Kent MD, 100 mg at 07/07/17 0210  •  glucagon (GLUCAGEN) injection 1 mg, 1 mg, Subcutaneous, Q15 Min PRN, Monet Kent MD  •  heparin (porcine) 5000 UNIT/ML injection 5,000 Units, 5,000 Units, Subcutaneous, Q12H, FRANCO Delgado, 5,000 Units at 07/06/17 213  •  insulin lispro (humaLOG) injection 0-7 Units, 0-7 Units,  "Subcutaneous, 4x Daily AC & at Bedtime, Monet Kent MD, 2 Units at 07/06/17 2133  •  losartan (COZAAR) tablet 50 mg, 50 mg, Oral, Daily, Gris Juan C V, APRN, 50 mg at 07/06/17 1058  •  nitroglycerin (NITROSTAT) SL tablet 0.4 mg, 0.4 mg, Sublingual, Q5 Min PRN, Gris Juan C V, APRN  •  nystatin (MYCOSTATIN) powder, , Topical, Q12H, Monet Kent MD  •  sodium chloride 0.9 % flush 1-10 mL, 1-10 mL, Intravenous, PRN, Gris Juan C V, APRN    Vital Sign Min/Max for last 24 hours  Temp  Min: 96.5 °F (35.8 °C)  Max: 97.9 °F (36.6 °C)   BP  Min: 122/79  Max: 142/98   Pulse  Min: 65  Max: 88   Resp  Min: 16  Max: 18   SpO2  Min: 97 %  Max: 97 %   No Data Recorded   Weight  Min: 170 lb (77.1 kg)  Max: 170 lb (77.1 kg)     Flowsheet Rows         First Filed Value    Admission Height  71\" (180.3 cm) Documented at 07/05/2017 2031    Admission Weight  170 lb (77.1 kg) Documented at 07/05/2017 2031            Intake/Output Summary (Last 24 hours) at 07/07/17 0904  Last data filed at 07/07/17 0735   Gross per 24 hour   Intake              920 ml   Output             3350 ml   Net            -2430 ml       Physical Exam:     General Appearance:    Alert, cooperative, in no acute distress   Lungs:     Bilateral crackles,respirations regular, even and                  unlabored    Heart:    Regular rhythm and normal rate, normal S1 and S2, 3-4/6 apical murmur, no gallop, no rub, no click   Chest Wall:    No abnormalities observed   Abdomen:     Normal bowel sounds, no masses, no organomegaly, soft        non-tender, non-distended, no guarding, no rebound                tenderness   Extremities:   Moves all extremities well, no edema, no cyanosis, no             redness   Pulses:   Pulses palpable and equal bilaterally   Skin:   No bleeding, bruising or rash        Results Review:     Results from last 7 days  Lab Units 07/05/17 2047   WBC 10*3/mm3 9.35   HEMOGLOBIN g/dL 13.0*   HEMATOCRIT % 41.1   PLATELETS " 10*3/mm3 235       Results from last 7 days  Lab Units 07/05/17  2047   SODIUM mmol/L 140   POTASSIUM mmol/L 3.5   CHLORIDE mmol/L 101   CO2 mmol/L 31.0   BUN mg/dL 19   CREATININE mg/dL 1.30   GLUCOSE mg/dL 138*                            Results from last 7 days  Lab Units 07/06/17  0922 07/06/17  0528   TROPONIN I ng/mL 0.020 0.023       Intake/Output Summary (Last 24 hours) at 07/07/17 0904  Last data filed at 07/07/17 0735   Gross per 24 hour   Intake              920 ml   Output             3350 ml   Net            -2430 ml       I personally viewed and interpreted the patient's EKG/Telemetry data    EKG: none for review today.     Telemetry: NSR        Present on Admission:  • Peripheral edema  • Acute on chronic systolic heart failure  • Coronary artery disease of native artery of native heart with stable angina pectoris  • Essential hypertension  • Hyperlipidemia LDL goal <70  • DM (diabetes mellitus)  • Bilateral leg ulcer  • CKD (chronic kidney disease)    Assessment/Plan   1. Acute on Chronic Systolic CHF with LLE and scrotal edema- previous EF 20-25%, good output with diuresis here with IV bumex, will continue for now and consider switching to PO tomorrow. Follow Cr. Echocardiogram this admission is pending.   Had not been taking home meds due to cost  On BB and ARB.   2. CAD- previous CABG 1/2015  On BB and statin  3. CKD- stable with diuresis, Cr 1.3  4. HTN- controlled  5. HLP- on statin  6. LLE- negative bilateral lower extremity doppler    I, Edilberto Phan MD, personally performed the services described as documented by the above named individual. I have made any necessary edits and it is both accurate and complete 7/7/2017  8:21 PM      MECCA Salmon  07/07/17  9:04 AM

## 2017-07-07 NOTE — CONSULTS
Attempted to see patient for diabetes education, however patient was sleeping soundly at time of visit.  Will attempt to see patient at a later time.   Thank you for this referral. Sabrina Schmid RN Diabetes Education

## 2017-07-07 NOTE — PROGRESS NOTES
Continued Stay Note  [facility]     Patient Name: Luis F Plummer  MRN: [id number]  Today's Date: 7/7/2017    Admit Date: 7/5/2017          Discharge Plan       07/07/17 1410    Case Management/Social Work Plan    Plan home    Patient/Family In Agreement With Plan yes    Additional Comments Met with patient at beside. PT has recommended rehab placement at discharge. Met with patient at bedside, he states he needs to think about it and does not want to make any decisions. He asks CM to check back later after he has had a chance to think it over. CM following.               Discharge Codes     None        Expected Discharge Date and Time     Expected Discharge Date Expected Discharge Time    Jul 10, 2017             Maria Fernanda Salas RN

## 2017-07-07 NOTE — PLAN OF CARE
Problem: Patient Care Overview (Adult)  Goal: Plan of Care Review  Outcome: Ongoing (interventions implemented as appropriate)    07/06/17 1410 07/06/17 2055   Coping/Psychosocial Response Interventions   Plan Of Care Reviewed With --  patient   Patient Care Overview   Progress progress toward functional goals as expected --    Outcome Evaluation   Outcome Summary/Follow up Plan Pt presents with weakness and decreased independence with functional mobility. Pt ambulated 20ft with RW with CGA x2, dem decreased safety awareness, mild confusion. PT recommends d/c to inpatient rehab. --        Goal: Adult Individualization and Mutuality  Outcome: Ongoing (interventions implemented as appropriate)  Goal: Discharge Needs Assessment  Outcome: Ongoing (interventions implemented as appropriate)    Problem: Fall Risk (Adult)  Goal: Identify Related Risk Factors and Signs and Symptoms  Outcome: Ongoing (interventions implemented as appropriate)  Goal: Absence of Falls  Outcome: Ongoing (interventions implemented as appropriate)    Problem: Skin Integrity Impairment, Risk/Actual (Adult)  Goal: Identify Related Risk Factors and Signs and Symptoms  Outcome: Ongoing (interventions implemented as appropriate)  Goal: Skin Integrity/Wound Healing  Outcome: Outcome(s) achieved Date Met:  07/07/17    Problem: Cardiac Output, Decreased (Adult)  Goal: Identify Related Risk Factors and Signs and Symptoms  Outcome: Ongoing (interventions implemented as appropriate)  Goal: Adequate Cardiac Output/Effective Tissue Perfusion  Outcome: Ongoing (interventions implemented as appropriate)

## 2017-07-07 NOTE — PROGRESS NOTES
[facility] Medicine Services  INPATIENT PROGRESS NOTE    Date of Admission: 7/5/2017  Length of Stay: 1  Primary Care Physician: No Known Provider    Subjective-- HPI/Events overnight/ROS/CC- Hospital follow visit.  Detailed ROS not detailed as performed below      No acute events, doing well and denies any new complaints. C/o persistent/constant  L sided chest pain.     Objective      Temp:  [96.5 °F (35.8 °C)-97.9 °F (36.6 °C)] 97.5 °F (36.4 °C)  Heart Rate:  [63-82] 63  Resp:  [14-18] 14  BP: (116-131)/(79-96) 116/83    Physical Exam:  Physical Exam    General Assessment: No acute cardiopulmonary distress.    CVS: RRR, S1S2 normal    Resp: CTAB, no adventitious sound    Abd: soft, NT, ND, normal BS, no guarding or peritoneal signs    Ext: LE edema slightly better. Scattered erythematous lesions, consistent with bug bites, on both LE. Both great toes have ulcer on lateral plantar surface, but the L great toe is bulbous/vilaceous. Bilat 3rd digit also have ulcer at tip of toe.    Neuro: Nonfocal    Psych: Affect is appropriate        Results Review:    I have reviewed the labs, radiology results and diagnostic studies.      Results from last 7 days  Lab Units 07/05/17 2047   WBC 10*3/mm3 9.35   HEMOGLOBIN g/dL 13.0*   PLATELETS 10*3/mm3 235       Results from last 7 days  Lab Units 07/07/17  0939   SODIUM mmol/L 137   POTASSIUM mmol/L 3.2*   CO2 mmol/L 27.0   CREATININE mg/dL 1.30   GLUCOSE mg/dL 154*       Culture Data:  Radiology Data:   Imaging Results (all)     Procedure Component Value Units Date/Time    XR Chest 1 View [[id number]]  (Abnormal) Collected:  07/05/17 2045     Updated:  07/05/17 2237    Narrative:       EXAM:    XR Chest, 1 View    CLINICAL HISTORY:    71 years old, male; Pain; Chest pain; Type not specified; Additional info:   Chest pain triage protocol    TECHNIQUE:    Frontal view of the chest.    COMPARISON:    CR - XR CHEST 1 VIEW PORTABLE 1/30/2015 5:42:26  AM    FINDINGS:    Median sternotomy wires are present. Mild cardiomegaly, similar to prior study.   Mild patchy right basilar airspace opacities (atelectasis and/or   consolidation), decreased from prior study. Large area of left basilar airspace   opacity (atelectasis and/or consolidation), similar to prior study. No visible   pneumothorax. Small right pleural effusion, decreased from prior study. Large   left pleural effusion, increased from prior study. Moderate pulmonary edema,   increased from prior study.      Impression:       1. Mild patchy right basilar airspace opacities (atelectasis and/or   consolidation), decreased from prior study.  2. Large area of left basilar airspace opacity (atelectasis and/or   consolidation), similar to prior study.  3. Small right pleural effusion, decreased from prior study.  4. Large left pleural effusion, increased from prior study.  5. Moderate pulmonary edema, increased from prior study.    THIS DOCUMENT HAS BEEN ELECTRONICALLY SIGNED BY MICHELLE JAEGER MD    MRI Foot Left With & Without Contrast [[id number]] Collected:  07/07/17 0954     Updated:  07/07/17 0954    Narrative:       EXAMINATION: MRI FOOT LEFT W WO CONTRAST-     INDICATION: R60.9-Edema, unspecified; D76-Viigmgf effusion, not  elsewhere classified; J81.0-Acute pulmonary edema; I50.23-Acute on  chronic systolic (congestive) heart failure; E78.5-Hyperlipidemia,  unspecified; I50.23-Acute on chronic systolic (congestive) heart  failure; I25.118-Atherosclerotic heart disease of native coronary artery  with other forms of angina pectoris; Z74.09-Other reduced mobility;  diffuse pain and swelling of the left foot, no focal draining lesion,  evaluate for bone infection or osteomyelitis.     TECHNIQUE: MR datasets of the left foot were performed without and with  intravenous contrast.     COMPARISON: Unfortunately, there are no comparison conventional images  available.     FINDINGS:   1. The patient is not able to  cooperate fully. There is motion  degradation and misregistration on many of the datasets.     Also, the positioning of the foot for imaging is less than optimal  because the patient's condition.     2. There is evidence of mild increased fluid and edema throughout the  tarsal tunnel without focal abscess. This appears to be a diffuse  cellulitis. A drainable fluid collection is not identified. There is  evidence of dorsal mid and forefoot subcutaneous edema without focal  mass. Mild subcutis edema surrounds the left ankle.     3. No pathologic T1 depressed signal is identified in the marrow of the  bone structures of the left foot to include hindfoot, midfoot and  forefoot structures.     4. Achilles tendon is normal. Calcaneus and talus are unremarkable. The  midfoot structures are negative for pathologic signal alteration and  negative for pathologic enhancement. The forefoot structures are also  intact. Although there is mild diffuse enhancement along the cellulitis  of the tarsal tunnel and plantar soft tissues, there is no definite  evidence to suggest focal enhancing lesion.     5. Lastly, there is mild increased signal in the subungual area of the  left great toe without evidence of pathologic bone marrow signal  alteration.       Impression:       1. Markedly limited examination because of constant movement of the left  foot during data set acquisition and therefore there is marked motion  degradation. Also, the positioning of the left foot is suboptimal for  imaging using this modality secondary to the patient's inability to  fully cooperate.  2. Grossly, however, there are no definite MR findings to support active  bone edema or intrinsic marrow infection or osteomyelitis.  3. There is diffuse soft tissue swelling around the left ankle, left mid  foot and left forefoot structures and there is diffuse inflammatory  cellulitis along the plantar aspect of the left foot involving tarsal  tunnel and plantar  muscular structures including the flexor tendons. A  focal abscess, drainable collection or focal enhancing lesion is not  identified in this region. The midfoot and hindfoot structures are  otherwise intact. The plantar fascia and Achilles tendon are  unremarkable. The dome of the talus is intact.  Sinus tarsi, calcaneus  and midfoot structures are also unremarkable otherwise. Therefore, there  appears to be a diffuse cellulitis with edema without evidence of  definite marrow infection or drainable focal abscess.      D:  07/07/2017  E:  07/07/2017             I have reviewed the medications.        Assessment/Plan     Assessment/Problem List  70 yo M, non-smoker, with history CAD, HTN, HLD, and DM2, who is noncompliant with medical therapy, reportedly due to financial difficulties, came in with worsening LE swelling and pain. +constant CP/dyspnea. He reported a recent fall and c/o back pain/R rib cage pain.    Principal Problem:    Acute on chronic systolic heart failure  Active Problems:    Peripheral edema    Coronary artery disease of native artery of native heart with stable angina pectoris    Essential hypertension    Hyperlipidemia LDL goal <70    DM (diabetes mellitus)    Bilateral leg ulcer    CKD (chronic kidney disease)    Bed bug bite    Peripheral neuropathy    Cellulitis-legs/feet with chronic ulcer      Plan  - Chest pain is likely MSK in nature, reproducible with palpation, will start on NSAIDs; Card follow-awaiting further recs.  - Duplex neg for DVT, echo pending  - Tick found on pt, so lesions on legs are probably due to tick bite, already on Doxy-plan for 14 day course  - MRI neg for osteo  - Will get arterial duplex to evaluate for PAOD  - DC planning  - A1C 7.9, improved when compared to previous. Cont with current low dose SSI for now and adjust PRN.  - TSH elevated, will check T4, will treat anyway even if T4 WNL-poss symptomatic  - Dispo: TBD, CM/PT/OT consulted.      Monet Kent  MD   07/07/17   2:41 PM    Please note that portions of this note may have been completed with a voice recognition program. Efforts were made to edit the dictations, but occasionally words are mistranscribed.

## 2017-07-08 ENCOUNTER — APPOINTMENT (OUTPATIENT)
Dept: CARDIOLOGY | Facility: HOSPITAL | Age: 72
End: 2017-07-08
Attending: HOSPITALIST

## 2017-07-08 LAB
ANION GAP SERPL CALCULATED.3IONS-SCNC: 11 MMOL/L (ref 3–11)
BUN BLD-MCNC: 28 MG/DL (ref 9–23)
BUN/CREAT SERPL: 18.7 (ref 7–25)
CALCIUM SPEC-SCNC: 8.3 MG/DL (ref 8.7–10.4)
CHLORIDE SERPL-SCNC: 102 MMOL/L (ref 99–109)
CO2 SERPL-SCNC: 23 MMOL/L (ref 20–31)
CREAT BLD-MCNC: 1.5 MG/DL (ref 0.6–1.3)
GFR SERPL CREATININE-BSD FRML MDRD: 46 ML/MIN/1.73
GLUCOSE BLD-MCNC: 216 MG/DL (ref 70–100)
GLUCOSE BLDC GLUCOMTR-MCNC: 114 MG/DL (ref 70–130)
GLUCOSE BLDC GLUCOMTR-MCNC: 133 MG/DL (ref 70–130)
GLUCOSE BLDC GLUCOMTR-MCNC: 159 MG/DL (ref 70–130)
GLUCOSE BLDC GLUCOMTR-MCNC: 180 MG/DL (ref 70–130)
POTASSIUM BLD-SCNC: 3.7 MMOL/L (ref 3.5–5.5)
SODIUM BLD-SCNC: 136 MMOL/L (ref 132–146)

## 2017-07-08 PROCEDURE — 80048 BASIC METABOLIC PNL TOTAL CA: CPT | Performed by: PHYSICIAN ASSISTANT

## 2017-07-08 PROCEDURE — 99232 SBSQ HOSP IP/OBS MODERATE 35: CPT | Performed by: HOSPITALIST

## 2017-07-08 PROCEDURE — 25010000002 HEPARIN (PORCINE) PER 1000 UNITS: Performed by: NURSE PRACTITIONER

## 2017-07-08 PROCEDURE — 82962 GLUCOSE BLOOD TEST: CPT

## 2017-07-08 RX ORDER — FUROSEMIDE 40 MG/1
80 TABLET ORAL 2 TIMES DAILY
Status: DISCONTINUED | OUTPATIENT
Start: 2017-07-08 | End: 2017-07-08

## 2017-07-08 RX ORDER — BUMETANIDE 1 MG/1
1 TABLET ORAL 2 TIMES DAILY
Status: DISCONTINUED | OUTPATIENT
Start: 2017-07-08 | End: 2017-07-10

## 2017-07-08 RX ADMIN — INSULIN LISPRO 2 UNITS: 100 INJECTION, SOLUTION INTRAVENOUS; SUBCUTANEOUS at 12:44

## 2017-07-08 RX ADMIN — LEVOTHYROXINE SODIUM 50 MCG: 50 TABLET ORAL at 06:45

## 2017-07-08 RX ADMIN — NAPROXEN 500 MG: 500 TABLET ORAL at 09:00

## 2017-07-08 RX ADMIN — PANTOPRAZOLE SODIUM 40 MG: 40 TABLET, DELAYED RELEASE ORAL at 06:45

## 2017-07-08 RX ADMIN — HEPARIN SODIUM 5000 UNITS: 5000 INJECTION, SOLUTION INTRAVENOUS; SUBCUTANEOUS at 21:10

## 2017-07-08 RX ADMIN — NYSTATIN: 100000 POWDER TOPICAL at 09:05

## 2017-07-08 RX ADMIN — CALCIUM CARBONATE 2 TABLET: 500 TABLET ORAL at 09:01

## 2017-07-08 RX ADMIN — DOXYCYCLINE 100 MG: 100 INJECTION, POWDER, LYOPHILIZED, FOR SOLUTION INTRAVENOUS at 12:40

## 2017-07-08 RX ADMIN — BUMETANIDE 1 MG: 0.25 INJECTION INTRAMUSCULAR; INTRAVENOUS at 10:19

## 2017-07-08 RX ADMIN — INSULIN LISPRO 3 UNITS: 100 INJECTION, SOLUTION INTRAVENOUS; SUBCUTANEOUS at 09:00

## 2017-07-08 RX ADMIN — BUMETANIDE 1 MG: 1 TABLET ORAL at 18:38

## 2017-07-08 RX ADMIN — ATORVASTATIN CALCIUM 20 MG: 20 TABLET, FILM COATED ORAL at 21:09

## 2017-07-08 RX ADMIN — NYSTATIN: 100000 POWDER TOPICAL at 21:10

## 2017-07-08 RX ADMIN — LOSARTAN POTASSIUM 50 MG: 50 TABLET, FILM COATED ORAL at 09:00

## 2017-07-08 RX ADMIN — DOXYCYCLINE 100 MG: 100 INJECTION, POWDER, LYOPHILIZED, FOR SOLUTION INTRAVENOUS at 03:17

## 2017-07-08 RX ADMIN — ASPIRIN 81 MG: 81 TABLET, COATED ORAL at 09:00

## 2017-07-08 RX ADMIN — CARVEDILOL 3.12 MG: 3.12 TABLET, FILM COATED ORAL at 09:00

## 2017-07-08 RX ADMIN — CARVEDILOL 3.12 MG: 3.12 TABLET, FILM COATED ORAL at 21:09

## 2017-07-08 NOTE — PROGRESS NOTES
"[facility] Progress Note     LOS: 2 days   Patient Care Team:  No Known Provider as PCP - General    Chief Complaint:    Chief Complaint   Patient presents with   • Leg Swelling       Subjective     Interval History:     Sleeping, no reports of chest pain shortness with palpitations from the nurse      Objective     Vital Sign Min/Max for last 24 hours  Temp  Min: 97.5 °F (36.4 °C)  Max: 97.8 °F (36.6 °C)   BP  Min: 116/65  Max: 140/90   Pulse  Min: 63  Max: 77   Resp  Min: 14  Max: 18   No Data Recorded   No Data Recorded   Weight  Min: 170 lb (77.1 kg)  Max: 170 lb (77.1 kg)     Flowsheet Rows         First Filed Value    Admission Height  71\" (180.3 cm) Documented at 07/05/2017 2031    Admission Weight  170 lb (77.1 kg) Documented at 07/05/2017 2031          Physical Exam:   General Appearance: Sleeping  Lungs: clear to auscultation, respirations regular, respirations even and respirations unlabored  Heart: regular rhythm & normal rate, normal S1, S2, no murmur, no rishi, no rub and no click  Abdomen: normal bowel sounds, no masses, no hepatomegaly, no splenomegaly, soft non-tender, no guarding and no rebound tenderness  Extremities: moves extremities well, no edema, no cyanosis and no redness  Pulses: Pulses palpable and equal bilaterally  Skin: no bleeding, bruising or rash  Psych: normal     Results Review:     I reviewed the patient's new clinical results.    Results from last 7 days  Lab Units 07/08/17  0756 07/07/17  0939 07/05/17  2047   SODIUM mmol/L 136 137 140   POTASSIUM mmol/L 3.7 3.2* 3.5   CHLORIDE mmol/L 102 101 101   CO2 mmol/L 23.0 27.0 31.0   BUN mg/dL 28* 17 19   CREATININE mg/dL 1.50* 1.30 1.30   GLUCOSE mg/dL 216* 154* 138*   CALCIUM mg/dL 8.3* 8.9 9.0       Results from last 7 days  Lab Units 07/05/17  2047   WBC 10*3/mm3 9.35   HEMOGLOBIN g/dL 13.0*   HEMATOCRIT % 41.1   PLATELETS 10*3/mm3 235     No results found for: TROPONINT    Results from last 7 days  Lab Units " 07/07/17  0939   CHOLESTEROL mg/dL 199   TRIGLYCERIDES mg/dL 67   HDL CHOL mg/dL 53               Ejection Fraction  No results found for: EF    Echo EF Estimated  Lab Results   Component Value Date    ECHOEFEST 20 07/07/2017       Nuclear Stress Ejection Fraction  No components found for: NUCEF    Cath Ejection Fraction Quantitative  No results found for: CATHEF    Physical Exam    Medication Review: yes  Current Facility-Administered Medications   Medication Dose Route Frequency Provider Last Rate Last Dose   • acetaminophen (TYLENOL) tablet 650 mg  650 mg Oral Q4H PRN Gris Irizarry V, APRN       • aspirin EC tablet 81 mg  81 mg Oral Daily Maryan Alonso APRN   81 mg at 07/08/17 0900   • atorvastatin (LIPITOR) tablet 20 mg  20 mg Oral Nightly Maryan Alonso APRN   20 mg at 07/07/17 2130   • bumetanide (BUMEX) injection 1 mg  1 mg Intravenous BID Maryan Alonso APRN   1 mg at 07/08/17 1019   • calcium carbonate (TUMS) chewable tablet 500 mg (200 mg elemental)  2 tablet Oral BID PRN Gris Irizarry V APRN   2 tablet at 07/08/17 0901   • carvedilol (COREG) tablet 3.125 mg  3.125 mg Oral Q12H Maryan Alonso APRN   3.125 mg at 07/08/17 0900   • dextrose (D50W) solution 25 g  25 g Intravenous Q15 Min PRN Monet Kent MD       • dextrose (GLUTOSE) oral gel 15 g  15 g Oral Q15 Min PRN Monet Kent MD       • doxycycline (VIBRAMYCIN) 100 mg/100 mL 0.9% NS MBP  100 mg Intravenous Q12H Monet Kent MD   100 mg at 07/08/17 0317   • glucagon (GLUCAGEN) injection 1 mg  1 mg Subcutaneous Q15 Min PRN Monet Kent MD       • heparin (porcine) 5000 UNIT/ML injection 5,000 Units  5,000 Units Subcutaneous Q12H Gris Irizarry V APRN   5,000 Units at 07/07/17 2129   • insulin lispro (humaLOG) injection 0-7 Units  0-7 Units Subcutaneous 4x Daily AC & at Bedtime Monet Kent MD   3 Units at 07/08/17 0900   • levothyroxine (SYNTHROID, LEVOTHROID) tablet 50 mcg  50 mcg  Oral Q AM Monet Kent MD   50 mcg at 07/08/17 0645   • losartan (COZAAR) tablet 50 mg  50 mg Oral Daily FRANCO Delgado   50 mg at 07/08/17 0900   • naproxen (NAPROSYN) tablet 500 mg  500 mg Oral BID With Meals Monet Kent MD   500 mg at 07/08/17 0900   • nitroglycerin (NITROSTAT) SL tablet 0.4 mg  0.4 mg Sublingual Q5 Min PRN FRANCO Delgado       • nystatin (MYCOSTATIN) powder   Topical Q12H Monet Kent MD       • pantoprazole (PROTONIX) EC tablet 40 mg  40 mg Oral Q AM Monet Kent MD   40 mg at 07/08/17 0645   • sodium chloride 0.9 % flush 1-10 mL  1-10 mL Intravenous PRN FRANCO Delgado             Assessment/Plan     Principal Problem:    Acute on chronic systolic heart failure  Active Problems:    Peripheral edema    Coronary artery disease of native artery of native heart with stable angina pectoris    Essential hypertension    Hyperlipidemia LDL goal <70    DM (diabetes mellitus)    Bilateral leg ulcer    CKD (chronic kidney disease)    Tick bite    Peripheral neuropathy    Cellulitis-legs/feet with chronic ulcer      Change to oral diuretics and monitor GFR  [facility] will revisit on Monday  Discontinue NSAIDs secondary to renal insufficiency    Reggie Infante MD  07/08/17  10:51 AM

## 2017-07-08 NOTE — PROGRESS NOTES
[facility] Medicine Services  INPATIENT PROGRESS NOTE    Date of Admission: 7/5/2017  Length of Stay: 2  Primary Care Physician: No Known Provider    Subjective-- HPI/Events overnight/ROS/CC- Hospital follow visit.  Detailed ROS not detailed as performed below      Doing well. No complaints.    Objective      Temp:  [97.5 °F (36.4 °C)-97.8 °F (36.6 °C)] 97.6 °F (36.4 °C)  Heart Rate:  [63-84] 84  Resp:  [18] 18  BP: (116-140)/(65-90) 123/83    Physical Exam:  Physical Exam    NAD  RRR  CTAB  Abd soft and NT  LE edema stable and unchanged- scattered erythematous lesion on legs are stable. Ulcerations on bilat great toe and 3rd digits are stable.  Nonfocal      Results Review:    I have reviewed the labs, radiology results and diagnostic studies.      Results from last 7 days  Lab Units 07/05/17  2047   WBC 10*3/mm3 9.35   HEMOGLOBIN g/dL 13.0*   PLATELETS 10*3/mm3 235       Results from last 7 days  Lab Units 07/08/17  0756   SODIUM mmol/L 136   POTASSIUM mmol/L 3.7   CO2 mmol/L 23.0   CREATININE mg/dL 1.50*   GLUCOSE mg/dL 216*       Culture Data:  Radiology Data:     I have reviewed the medications.        Assessment/Plan     Assessment/Problem List  70 yo M, non-smoker, with history CAD, HTN, HLD, and DM2, who is noncompliant with medical therapy, reportedly due to financial difficulties, came in with worsening LE swelling and pain. +constant CP/dyspnea. He reported a recent fall and c/o back pain/R rib cage pain.    Principal Problem:    Acute on chronic systolic heart failure  Active Problems:    Peripheral edema    Coronary artery disease of native artery of native heart with stable angina pectoris    Essential hypertension    Hyperlipidemia LDL goal <70    DM (diabetes mellitus)    Bilateral leg ulcer    CKD (chronic kidney disease)    Tick bite    Peripheral neuropathy    Cellulitis-legs/feet with chronic ulcer      Plan  - Cr elevated, poss secondary to Naproxen/diuretics, will  monitor, if worsening, may need to back off on diuretics too  - NSAID discontinued  - Diuretics changed to PO  - Awaiting arterial duplex to assess for poss PAOD. Pt has chronic nonhealing ulcers on both great toes and 3rd digits. If no PAOD, ulcers may be related to poor fitting shoes.  - Cont on Doxy for LE cellulitis/Tick bites  - DC planning in progress: SW to assess for home safety. May need SNF for rehab once medically stable.      Monet Kent MD   07/08/17   2:16 PM    Please note that portions of this note may have been completed with a voice recognition program. Efforts were made to edit the dictations, but occasionally words are mistranscribed.

## 2017-07-08 NOTE — PLAN OF CARE
Problem: Patient Care Overview (Adult)  Goal: Plan of Care Review  Outcome: Ongoing (interventions implemented as appropriate)    07/06/17 1410 07/07/17 2045   Coping/Psychosocial Response Interventions   Plan Of Care Reviewed With --  patient   Patient Care Overview   Progress progress toward functional goals as expected --          Problem: Fall Risk (Adult)  Goal: Identify Related Risk Factors and Signs and Symptoms  Outcome: Ongoing (interventions implemented as appropriate)    07/06/17 0511   Fall Risk   Fall Risk: Related Risk Factors gait/mobility problems;history of falls;environment unfamiliar   Fall Risk: Signs and Symptoms presence of risk factors         Problem: Skin Integrity Impairment, Risk/Actual (Adult)  Goal: Identify Related Risk Factors and Signs and Symptoms  Outcome: Ongoing (interventions implemented as appropriate)

## 2017-07-09 ENCOUNTER — APPOINTMENT (OUTPATIENT)
Dept: CARDIOLOGY | Facility: HOSPITAL | Age: 72
End: 2017-07-09
Attending: HOSPITALIST

## 2017-07-09 ENCOUNTER — APPOINTMENT (OUTPATIENT)
Dept: GENERAL RADIOLOGY | Facility: HOSPITAL | Age: 72
End: 2017-07-09

## 2017-07-09 LAB
ANION GAP SERPL CALCULATED.3IONS-SCNC: 10 MMOL/L (ref 3–11)
ANION GAP SERPL CALCULATED.3IONS-SCNC: 10 MMOL/L (ref 3–11)
APTT PPP: 31 SECONDS (ref 45–60)
BH CV ECHO MEAS - BSA(HAYCOCK): 2 M^2
BH CV ECHO MEAS - BSA: 2 M^2
BH CV ECHO MEAS - BZI_BMI: 23.7 KILOGRAMS/M^2
BH CV ECHO MEAS - BZI_METRIC_HEIGHT: 180.3 CM
BH CV ECHO MEAS - BZI_METRIC_WEIGHT: 77.1 KG
BH CV GRAFT BRACHIAL PRESSURE LEFT: 131 MMHG
BH CV GRAFT BRACHIAL PRESSURE RIGHT: 142 MMHG
BH CV LEA LEFT ANT TIBIAL A DISTAL PSV: 41.9 CM/S
BH CV LEA LEFT ANT TIBIAL A MID PSV: 43.2 CM/S
BH CV LEA LEFT ANT TIBIAL A PROX PSV: 47.6 CM/S
BH CV LEA LEFT CFA PROX PSV: 72.2 CM/S
BH CV LEA LEFT DFA PROX PSV: 40.1 CM/S
BH CV LEA LEFT PERONEAL  MID PSV: 82.1 CM/S
BH CV LEA LEFT POPITEAL A  DISTAL PSV: 88.8 CM/S
BH CV LEA LEFT POPITEAL A  PROX PSV: 97.4 CM/S
BH CV LEA LEFT PTA DISTAL PSV: 63.3 CM/S
BH CV LEA LEFT PTA MID PSV: 27.9 CM/S
BH CV LEA LEFT PTA PRESSURE: 150 MMHG
BH CV LEA LEFT PTA PROX PSV: 51.3 CM/S
BH CV LEA LEFT SFA DISTAL PSV: 134 CM/S
BH CV LEA LEFT SFA MID PSV: 113 CM/S
BH CV LEA LEFT SFA PROX PSV: 65.9 CM/S
BH CV LEA RIGHT ANT TIBIAL A DISTAL PSV: 68 CM/S
BH CV LEA RIGHT ANT TIBIAL A MID PSV: 24.1 CM/S
BH CV LEA RIGHT ANT TIBIAL A PROX PSV: 41.3 CM/S
BH CV LEA RIGHT CFA PROX PSV: 83 CM/S
BH CV LEA RIGHT DFA PROX PSV: 36 CM/S
BH CV LEA RIGHT PERONEAL  MID PSV: 57.3 CM/S
BH CV LEA RIGHT POPITEAL A  DISTAL PSV: 57.8 CM/S
BH CV LEA RIGHT POPITEAL A  PROX PSV: 50.3 CM/S
BH CV LEA RIGHT PTA DISTAL PSV: 59.6 CM/S
BH CV LEA RIGHT PTA MID PSV: 257 CM/S
BH CV LEA RIGHT PTA PRESSURE: 160 MMHG
BH CV LEA RIGHT PTA PROX PSV: 62.4 CM/S
BH CV LEA RIGHT SFA DISTAL PSV: 61.7 CM/S
BH CV LEA RIGHT SFA MID PSV: 101 CM/S
BH CV LEA RIGHT SFA PROX PSV: 54.2 CM/S
BH CV LOWER ARTERIAL LEFT ABI RATIO: 1
BH CV LOWER ARTERIAL RIGHT ABI RATIO: 1.1
BUN BLD-MCNC: 23 MG/DL (ref 9–23)
BUN BLD-MCNC: 24 MG/DL (ref 9–23)
BUN/CREAT SERPL: 17.7 (ref 7–25)
BUN/CREAT SERPL: 18.5 (ref 7–25)
CALCIUM SPEC-SCNC: 8.6 MG/DL (ref 8.7–10.4)
CALCIUM SPEC-SCNC: 8.9 MG/DL (ref 8.7–10.4)
CHLORIDE SERPL-SCNC: 101 MMOL/L (ref 99–109)
CHLORIDE SERPL-SCNC: 99 MMOL/L (ref 99–109)
CO2 SERPL-SCNC: 26 MMOL/L (ref 20–31)
CO2 SERPL-SCNC: 26 MMOL/L (ref 20–31)
CREAT BLD-MCNC: 1.3 MG/DL (ref 0.6–1.3)
CREAT BLD-MCNC: 1.3 MG/DL (ref 0.6–1.3)
DEPRECATED RDW RBC AUTO: 54 FL (ref 37–54)
DIST ATA PSV LEFT: 42.5 CM/SEC
DIST ATA PSV RIGHT: 68.4 CM/SEC
DIST POP A PSV LEFT: 89.6 CM/SEC
DIST POP A PSV RIGHT: -58.1 CM/SEC
DIST PTA PSV LEFT: 63.6 CM/SEC
DIST PTA PSV RIGHT: 60.1 CM/SEC
DIST SFA PSV LEFT: -134.6 CM/SEC
DIST SFA PSV RIGHT: -62.1 CM/SEC
ERYTHROCYTE [DISTWIDTH] IN BLOOD BY AUTOMATED COUNT: 16.4 % (ref 11.3–14.5)
GFR SERPL CREATININE-BSD FRML MDRD: 54 ML/MIN/1.73
GFR SERPL CREATININE-BSD FRML MDRD: 54 ML/MIN/1.73
GLUCOSE BLD-MCNC: 149 MG/DL (ref 70–100)
GLUCOSE BLD-MCNC: 193 MG/DL (ref 70–100)
GLUCOSE BLDC GLUCOMTR-MCNC: 143 MG/DL (ref 70–130)
GLUCOSE BLDC GLUCOMTR-MCNC: 150 MG/DL (ref 70–130)
GLUCOSE BLDC GLUCOMTR-MCNC: 161 MG/DL (ref 70–130)
GLUCOSE BLDC GLUCOMTR-MCNC: 225 MG/DL (ref 70–130)
HCT VFR BLD AUTO: 36.7 % (ref 38.9–50.9)
HGB BLD-MCNC: 11.8 G/DL (ref 13.1–17.5)
LEFT CFA PROX SYS PSV: 72.7 CM/SEC
MCH RBC QN AUTO: 28.5 PG (ref 27–31)
MCHC RBC AUTO-ENTMCNC: 32.2 G/DL (ref 32–36)
MCV RBC AUTO: 88.6 FL (ref 80–99)
MID ATA PSV LEFT: 43.7 CM/SEC
MID ATA PSV RIGHT: 24.4 CM/SEC
MID PERO A PSV LEFT: 82.1 CM/SEC
MID PERO A PSV RIGHT: -57.9 CM/SEC
MID PTA PSV LEFT: 28.1 CM/SEC
MID PTA PSV RIGHT: 258.2 CM/SEC
MID SFA PSV LEFT: -113.8 CM/SEC
MID SFA PSV RIGHT: -101.2 CM/SEC
PLATELET # BLD AUTO: 172 10*3/MM3 (ref 150–450)
PMV BLD AUTO: 9.4 FL (ref 6–12)
POTASSIUM BLD-SCNC: 3.2 MMOL/L (ref 3.5–5.5)
POTASSIUM BLD-SCNC: 3.2 MMOL/L (ref 3.5–5.5)
PROX ATA PSV LEFT: 48.1 CM/SEC
PROX ATA PSV RIGHT: 41.6 CM/SEC
PROX PFA PSV LEFT: -40.5 CM/SEC
PROX PFA PSV RIGHT: -36.5 CM/SEC
PROX POP A PSV LEFT: 98.2 CM/SEC
PROX POP A PSV RIGHT: 50.7 CM/SEC
PROX PTA PSV LEFT: 51.8 CM/SEC
PROX PTA PSV RIGHT: 62.9 CM/SEC
PROX SFA PSV LEFT: 66.4 CM/SEC
PROX SFA PSV RIGHT: -54.6 CM/SEC
RBC # BLD AUTO: 4.14 10*6/MM3 (ref 4.2–5.76)
RIGHT CFA PROX SYS PSV: 83.6 CM/SEC
SODIUM BLD-SCNC: 135 MMOL/L (ref 132–146)
SODIUM BLD-SCNC: 137 MMOL/L (ref 132–146)
TROPONIN I SERPL-MCNC: 0.03 NG/ML
WBC NRBC COR # BLD: 9.2 10*3/MM3 (ref 3.5–10.8)

## 2017-07-09 PROCEDURE — 93925 LOWER EXTREMITY STUDY: CPT

## 2017-07-09 PROCEDURE — 93010 ELECTROCARDIOGRAM REPORT: CPT | Performed by: INTERNAL MEDICINE

## 2017-07-09 PROCEDURE — 97110 THERAPEUTIC EXERCISES: CPT

## 2017-07-09 PROCEDURE — 93005 ELECTROCARDIOGRAM TRACING: CPT | Performed by: HOSPITALIST

## 2017-07-09 PROCEDURE — 99232 SBSQ HOSP IP/OBS MODERATE 35: CPT | Performed by: HOSPITALIST

## 2017-07-09 PROCEDURE — 80048 BASIC METABOLIC PNL TOTAL CA: CPT | Performed by: INTERNAL MEDICINE

## 2017-07-09 PROCEDURE — 82962 GLUCOSE BLOOD TEST: CPT

## 2017-07-09 PROCEDURE — 25010000002 HEPARIN (PORCINE) PER 1000 UNITS: Performed by: NURSE PRACTITIONER

## 2017-07-09 PROCEDURE — 85730 THROMBOPLASTIN TIME PARTIAL: CPT | Performed by: HOSPITALIST

## 2017-07-09 PROCEDURE — 80048 BASIC METABOLIC PNL TOTAL CA: CPT | Performed by: HOSPITALIST

## 2017-07-09 PROCEDURE — 25010000002 MORPHINE SULFATE (PF) 2 MG/ML SOLUTION: Performed by: PHYSICIAN ASSISTANT

## 2017-07-09 PROCEDURE — 97530 THERAPEUTIC ACTIVITIES: CPT

## 2017-07-09 PROCEDURE — 84484 ASSAY OF TROPONIN QUANT: CPT | Performed by: INTERNAL MEDICINE

## 2017-07-09 PROCEDURE — 85027 COMPLETE CBC AUTOMATED: CPT | Performed by: HOSPITALIST

## 2017-07-09 PROCEDURE — 84484 ASSAY OF TROPONIN QUANT: CPT | Performed by: HOSPITALIST

## 2017-07-09 PROCEDURE — 71010 HC CHEST PA OR AP: CPT

## 2017-07-09 RX ORDER — POTASSIUM CHLORIDE 750 MG/1
40 CAPSULE, EXTENDED RELEASE ORAL ONCE
Status: COMPLETED | OUTPATIENT
Start: 2017-07-09 | End: 2017-07-09

## 2017-07-09 RX ORDER — MORPHINE SULFATE 2 MG/ML
2 INJECTION, SOLUTION INTRAMUSCULAR; INTRAVENOUS ONCE
Status: COMPLETED | OUTPATIENT
Start: 2017-07-09 | End: 2017-07-09

## 2017-07-09 RX ADMIN — HEPARIN SODIUM 5000 UNITS: 5000 INJECTION, SOLUTION INTRAVENOUS; SUBCUTANEOUS at 10:08

## 2017-07-09 RX ADMIN — ASPIRIN 81 MG: 81 TABLET, COATED ORAL at 10:10

## 2017-07-09 RX ADMIN — LOSARTAN POTASSIUM 50 MG: 50 TABLET, FILM COATED ORAL at 10:11

## 2017-07-09 RX ADMIN — ACETAMINOPHEN 650 MG: 325 TABLET, FILM COATED ORAL at 03:40

## 2017-07-09 RX ADMIN — INSULIN LISPRO 2 UNITS: 100 INJECTION, SOLUTION INTRAVENOUS; SUBCUTANEOUS at 10:09

## 2017-07-09 RX ADMIN — NYSTATIN: 100000 POWDER TOPICAL at 21:01

## 2017-07-09 RX ADMIN — LEVOTHYROXINE SODIUM 50 MCG: 50 TABLET ORAL at 06:52

## 2017-07-09 RX ADMIN — CARVEDILOL 3.12 MG: 3.12 TABLET, FILM COATED ORAL at 20:59

## 2017-07-09 RX ADMIN — CARVEDILOL 3.12 MG: 3.12 TABLET, FILM COATED ORAL at 10:10

## 2017-07-09 RX ADMIN — BUMETANIDE 1 MG: 1 TABLET ORAL at 18:19

## 2017-07-09 RX ADMIN — NITROGLYCERIN 0.4 MG: 0.4 TABLET SUBLINGUAL at 20:26

## 2017-07-09 RX ADMIN — DOXYCYCLINE 100 MG: 100 INJECTION, POWDER, LYOPHILIZED, FOR SOLUTION INTRAVENOUS at 01:54

## 2017-07-09 RX ADMIN — DOXYCYCLINE 100 MG: 100 INJECTION, POWDER, LYOPHILIZED, FOR SOLUTION INTRAVENOUS at 12:57

## 2017-07-09 RX ADMIN — INSULIN LISPRO 2 UNITS: 100 INJECTION, SOLUTION INTRAVENOUS; SUBCUTANEOUS at 12:56

## 2017-07-09 RX ADMIN — HEPARIN SODIUM 5000 UNITS: 5000 INJECTION, SOLUTION INTRAVENOUS; SUBCUTANEOUS at 21:00

## 2017-07-09 RX ADMIN — POTASSIUM CHLORIDE 40 MEQ: 750 CAPSULE, EXTENDED RELEASE ORAL at 23:34

## 2017-07-09 RX ADMIN — NITROGLYCERIN 0.4 MG: 0.4 TABLET SUBLINGUAL at 20:36

## 2017-07-09 RX ADMIN — NITROGLYCERIN 0.4 MG: 0.4 TABLET SUBLINGUAL at 20:31

## 2017-07-09 RX ADMIN — ATORVASTATIN CALCIUM 20 MG: 20 TABLET, FILM COATED ORAL at 20:59

## 2017-07-09 RX ADMIN — INSULIN LISPRO 3 UNITS: 100 INJECTION, SOLUTION INTRAVENOUS; SUBCUTANEOUS at 20:59

## 2017-07-09 RX ADMIN — NYSTATIN: 100000 POWDER TOPICAL at 10:11

## 2017-07-09 RX ADMIN — PANTOPRAZOLE SODIUM 40 MG: 40 TABLET, DELAYED RELEASE ORAL at 06:52

## 2017-07-09 RX ADMIN — BUMETANIDE 1 MG: 1 TABLET ORAL at 10:11

## 2017-07-09 RX ADMIN — MORPHINE SULFATE 2 MG: 2 INJECTION, SOLUTION INTRAMUSCULAR; INTRAVENOUS at 20:54

## 2017-07-09 NOTE — THERAPY TREATMENT NOTE
Acute Care - Physical Therapy Treatment Note  [facility]     Patient Name: Luis F Plummer  : 1945  MRN: [id number]  Today's Date: 2017  Onset of Illness/Injury or Date of Surgery Date: 17  Date of Referral to PT: 17  Referring Physician: FRANCO Irizarry    Admit Date: 2017    Visit Dx:    ICD-10-CM ICD-9-CM   1. Peripheral edema R60.9 782.3   2. Pleural effusion J90 511.9   3. Acute pulmonary edema J81.0 518.4   4. Acute on chronic systolic congestive heart failure I50.23 428.23     428.0   5. Hyperlipidemia LDL goal <70 E78.5 272.4   6. Acute on chronic systolic heart failure I50.23 428.23   7. Coronary artery disease of native artery of native heart with stable angina pectoris I25.118 414.01     413.9   8. Impaired mobility and ADLs Z74.09 799.89   9. Impaired functional mobility, balance, gait, and endurance Z74.09 V49.89     Patient Active Problem List   Diagnosis   • Peripheral edema   • Acute on chronic systolic heart failure   • Coronary artery disease of native artery of native heart with stable angina pectoris   • Essential hypertension   • Hyperlipidemia LDL goal <70   • DM (diabetes mellitus)   • Bilateral leg ulcer   • CKD (chronic kidney disease)   • Tick bite   • Peripheral neuropathy   • Cellulitis-legs/feet with chronic ulcer               Adult Rehabilitation Note       17 1128          Rehab Assessment/Intervention    Discipline physical therapist  -LS      Document Type therapy note (daily note)  -LS      Subjective Information agree to therapy;complains of;pain  -LS      Patient Effort, Rehab Treatment good  -LS      Symptoms Noted During/After Treatment fatigue  -LS      Precautions/Limitations fall precautions  -LS      Specific Treatment Considerations Pt [place].  -LS      Recorded by [LS] Shae Robledo, PT      Vital Signs    Pre Systolic BP Rehab 142   RN aware; gave consent for therapy  -LS      Pre Treatment Diastolic   -LS      Pretreatment Heart  Rate (beats/min) 76  -LS      O2 Delivery Pre Treatment room air  -LS      O2 Delivery Post Treatment room air  -LS      Pre Patient Position Supine  -LS      Intra Patient Position Standing  -LS      Post Patient Position Sitting  -LS      Recorded by [LS] Shae Robledo, PT      Pain Assessment    Pain Assessment 0-10  -LS      Pain Score 8  -LS      Post Pain Score 8  -LS      Pain Location Leg  -LS      Pain Orientation Right;Left  -LS      Pain Intervention(s) Repositioned;Ambulation/increased activity  -LS      Response to Interventions tolerated; notified RN  -LS      Recorded by [LS] Shae Robledo, PT      Cognitive Assessment/Intervention    Current Cognitive/Communication Assessment functional  -LS      Orientation Status oriented to;person;place;situation   cues for time of day  -LS      Follows Commands/Answers Questions able to follow single-step instructions;100% of the time;needs cueing  -LS      Personal Safety mild impairment;decreased awareness, need for assist;decreased awareness, need for safety;decreased insight to deficits  -LS      Personal Safety Interventions fall prevention program maintained;gait belt;nonskid shoes/slippers when out of bed  -LS      Recorded by [LS] Shae Robledo, PT      Bed Mobility, Assessment/Treatment    Bed Mobility, Assistive Device head of bed elevated  -LS      Bed Mob, Supine to Sit, [place] minimum assist (75% patient effort);verbal cues required  -LS      Bed Mob, Sit to Supine, [place] not tested  -LS      Recorded by [LS] Shae Robledo, PT      Transfer Assessment/Treatment    Transfers, Sit-Stand [place] minimum assist (75% patient effort);verbal cues required  -LS      Transfers, Stand-Sit [place] minimum assist (75% patient effort);verbal cues required  -LS      Transfers, Sit-Stand-Sit, Assist Device rolling walker  -LS      Toilet Transfer, [place] contact guard assist  -LS      Toilet Transfer, Assistive Device rolling walker   -LS      Transfer, Impairments strength decreased;impaired balance  -LS      Transfer, Comment Min A for initial stand from EOB; pt progressed to [facility] with [place] from [facility] VC's for hand placement.   -LS      Recorded by [LS] Shae Robledo, PT      Gait Assessment/Treatment    Gait, [place] Level contact guard assist;verbal cues required  -LS      Gait, Assistive Device rolling walker  -LS      Gait, Distance (Feet) 70  -LS      Gait, Gait Deviations an[place] decreased;forward flexed posture;step length decreased  -LS      Gait, Impairments pain;strength decreased  -LS      Gait, Comment VC's for posture and increasing step length within RWx; distance limited by fatigue.   -LS      Recorded by [LS] Shae Robledo PT      Motor Skills/Interventions    Additional Documentation Balance Skills Training (Group)  -LS      Recorded by [LS] Shae Robledo PT      Balance Skills Training    Sitting-Level of Assistance Independent  -LS      Sitting-Balance Support Feet supported  -LS      Standing-Level of Assistance Contact guard  -LS      Static Standing Balance Support assistive device  -LS      Standing-Balance Activities Weight Shift A-P  -LS      Standing Balance # of Minutes 2   standing at [facility] for hygiene  -LS      Gait Balance-Level of Assistance Contact guard  -LS      Gait Balance Support assistive device  -LS      Recorded by [LS] Shae Robledo, PT      Therapy Exercises    Bilateral Lower Extremities AROM:;10 reps;sitting;ankle pumps/circles;hip flexion;LAQ  -LS      Recorded by [LS] Shae Robledo PT      Positioning and Restraints    Pre-Treatment Position in bed  -LS      Post Treatment Position chair  -LS      In Chair notified nsg;reclined;call light within reach;encouraged to call for assist;exit alarm on;legs elevated  -LS      Recorded by [LS] Shae Robledo PT        User Key  (r) = Recorded By, (t) = Taken By, (c) = Cosigned By    Initials Name Effective Dates    ZACHARY Robledo PT 06/19/15 -                  IP PT Goals       07/09/17 1207 07/06/17 1410       Transfer Training PT LTG    Transfer Training PT LTG, Date Established  07/06/17  -SJ     Transfer Training PT LTG, Time to Achieve  2 wks  -SJ     Transfer Training PT LTG, Activity Type  bed to chair /chair to bed;sit to stand/stand to sit  -SJ     Transfer Training PT LTG, [place] Level  supervision required  -SJ     Transfer Training PT LTG, Assist Device  walker, rolling  -SJ     Transfer Training PT LTG, Outcome  goal ongoing  -SJ     Gait Training PT LTG    Gait Training Goal PT LTG, Date Established  07/06/17  -SJ     Gait Training Goal PT LTG, Time to Achieve  2 wks  -SJ     Gait Training Goal PT LTG, [place] Level  supervision required  -SJ     Gait Training Goal PT LTG, Assist Device  walker, rolling  -SJ     Gait Training Goal PT LTG, Distance to Achieve  200  -SJ     Gait Training Goal PT LTG, Outcome goal ongoing  -LS goal ongoing  -SJ     Dynamic Standing Balance PT LTG    Dynamic Standing Balance PT LTG, Date Established  07/06/17  -SJ     Dynamic Standing Balance PT LTG, Time to Achieve  2 wks  -SJ     Dynamic Standing Balance PT LTG, [place] Level conditional independence  -LS contact guard assist  -SJ     Dynamic Standing Balance PT LTG, Assist Device assistive Device  -LS      Dynamic Standing Balance PT LTG, Date Goal Reviewed 07/09/17   previous goal achieved 7/9  -LS      Dynamic Standing Balance PT LTG, Outcome goal revised  -LS goal ongoing  -SJ       User Key  (r) = Recorded By, (t) = Taken By, (c) = Cosigned By    Initials Name Provider Type    LUCIUS Gutierrez, PT Physical Therapist    ZACHARY Robledo, PT Physical Therapist          Physical Therapy Education     Title: PT OT SLP Therapies (Active)     Topic: Physical Therapy (Active)     Point: Mobility training (Active)    Learning Progress Summary    Learner Readiness Method Response Comment Documented by Status   Patient Acceptance E,D NR  LS 07/09/17  1207 Active    Acceptance E NR   07/06/17 1412 Active               Point: Home exercise program (Active)    Learning Progress Summary    Learner Readiness Method Response Comment Documented by Status   Patient Acceptance E,D NR   07/09/17 1207 Active               Point: Body mechanics (Active)    Learning Progress Summary    Learner Readiness Method Response Comment Documented by Status   Patient Acceptance E,D NR   07/09/17 1207 Active    Acceptance E NR   07/06/17 1412 Active               Point: Precautions (Active)    Learning Progress Summary    Learner Readiness Method Response Comment Documented by Status   Patient Acceptance E,D NR   07/09/17 1207 Active    Acceptance E NR   07/06/17 1412 Active                      User Key     Initials Effective Dates Name Provider Type Discipline     06/19/15 -  Sakshi Gutierrez, PT Physical Therapist PT     06/19/15 -  Shae Robledo, PT Physical Therapist PT                    PT Recommendation and Plan  Anticipated Discharge Disposition: inpatient rehabilitation facility  Planned Therapy Interventions: balance training, bed mobility training, gait training, home exercise program, patient/family education, strengthening, transfer training  PT Frequency: daily  Plan of Care Review  Plan Of Care Reviewed With: patient  Progress: improving  Outcome Summary/Follow up Plan: Pt demonstrated ability to progress forward ambulation distance to 70 total ft on RA with use of RWx. Cont to be limited by fatigue and c/o LE pain. Gave good effort with seated ther ex. Will progress as clinically warranted.           Outcome Measures       07/09/17 1128 07/06/17 1311 07/06/17 1310    How much help from another person do you currently need...    Turning from your back to your side while in flat bed without using bedrails? 4  -LS  4  -SJ    Moving from lying on back to sitting on the side of a flat bed without bedrails? 3  -LS  4  -SJ    Moving to and from a bed to a chair  (including a wheelchair)? 3  -LS  3  -SJ    Standing up from a chair using your arms (e.g., wheelchair, bedside chair)? 3  -LS  3  -SJ    Climbing 3-5 steps with a railing? 2  -LS  2  -SJ    To walk in hospital room? 3  -LS  3  -SJ    AM-PAC 6 Clicks Score 18  -LS  19  -SJ    How much help from another is currently needed...    Putting on and taking off regular lower body clothing?  1  -JR     Bathing (including washing, rinsing, and drying)  2  -JR     Toileting (which includes using toilet bed pan or urinal)  2  -JR     Putting on and taking off regular upper body clothing  3  -JR     Taking care of personal grooming (such as brushing teeth)  4  -JR     Eating meals  4  -JR     Score  16  -JR     Functional Assessment    Outcome Measure Options AM-PAC 6 Clicks Basic Mobility (PT)  - AM-PAC 6 Clicks Daily Activity (OT)  -JR AM-PAC 6 Clicks Basic Mobility (PT)  -      User Key  (r) = Recorded By, (t) = Taken By, (c) = Cosigned By    Initials Name Provider Type    LUCIUS Gutierrez, PT Physical Therapist    JR Hilary Bolden, OT Occupational Therapist    ZACHARY Robledo, PT Physical Therapist           Time Calculation:         PT Charges       07/09/17 1209          Time Calculation    Start Time 1128  -      PT Received On 07/09/17  -      PT Goal Re-Cert Due Date 07/16/17  -      Time Calculation- PT    Total Timed Code Minutes- PT 17 minute(s)  -        User Key  (r) = Recorded By, (t) = Taken By, (c) = Cosigned By    Initials Name Provider Type    ZACHARY Robledo, PT Physical Therapist          Therapy Charges for Today     Code Description Service Date Service Provider Modifiers Qty    11445111333 HC PT THER PROC EA 15 MIN 7/9/2017 Shae Robledo, PT GP 1          PT G-Codes  Outcome Measure Options: AM-PAC 6 Clicks Basic Mobility (PT)    Shae Robledo, PT  7/9/2017

## 2017-07-09 NOTE — PLAN OF CARE
Problem: Patient Care Overview (Adult)  Goal: Plan of Care Review  Outcome: Ongoing (interventions implemented as appropriate)    07/09/17 1253   Coping/Psychosocial Response Interventions   Plan Of Care Reviewed With patient   Patient Care Overview   Progress improving   Outcome Evaluation   Outcome Summary/Follow up Plan Pt demonstrated improved activity tolerance by ambulating 70 ft w/ CGAx1 and RW. Pt conts to require assist for balance and is dependent for post toilet hygiene. Recommend cont skilled IPOT POC.          Problem: Inpatient Occupational Therapy  Goal: Bed Mobility Goal LTG- OT  Outcome: Ongoing (interventions implemented as appropriate)    07/06/17 1401 07/09/17 1253   Bed Mobility OT LTG   Bed Mobility OT LTG, Date Established 07/06/17 --    Bed Mobility OT LTG, Time to Achieve 1 wk --    Bed Mobility OT LTG, Activity Type all bed mobility --    Bed Mobility OT LTG, [place] Level conditional independence --    Bed Mobility OT LTG, Outcome --  goal ongoing       Goal: Transfer Training Goal 1 LTG- OT  Outcome: Ongoing (interventions implemented as appropriate)    07/06/17 1401 07/09/17 1253   Transfer Training OT LTG   Transfer Training OT LTG, Date Established 07/06/17 --    Transfer Training OT LTG, Time to Achieve 1 wk --    Transfer Training OT LTG, Activity Type bed to chair /chair to bed --    Transfer Training OT LTG, [place] Level supervision required --    Transfer Training OT LTG, Assist Device other (see comments)  (with appropriate AD) --    Transfer Training OT LTG, Outcome --  goal ongoing       Goal: LB Dressing Goal LTG- OT  Outcome: Ongoing (interventions implemented as appropriate)    07/06/17 1401 07/09/17 1253   LB Dressing OT LTG   LB Dressing Goal OT LTG, Date Established 07/06/17 --    LB Dressing Goal OT LTG, Time to Achieve 1 wk --    LB Dressing Goal OT LTG, Activity Type alvin/doff B socks --    LB Dressing Goal OT LTG, [place] Level moderate assist (50%  patient effort) --    LB Dressing Goal OT LTG, Adaptive Equipment reacher;sock-aid --    LB Dressing Goal OT LTG, Outcome --  goal ongoing

## 2017-07-09 NOTE — PLAN OF CARE
Problem: Patient Care Overview (Adult)  Goal: Plan of Care Review  Outcome: Ongoing (interventions implemented as appropriate)    07/09/17 0422   Coping/Psychosocial Response Interventions   Plan Of Care Reviewed With patient   Patient Care Overview   Progress progress toward functional goals as expected   Outcome Evaluation   Outcome Summary/Follow up Plan VSS, patient started moaning about 0300 c/o side pain given prn tylenol. Remains on IV abx.         07/09/17 0422   Coping/Psychosocial Response Interventions   Plan Of Care Reviewed With patient   Patient Care Overview   Progress progress toward functional goals as expected   Outcome Evaluation   Outcome Summary/Follow up Plan VSS, patient started moaning about 0300 c/o side pain given prn tylenol. Remains on IV abx.         Problem: Fall Risk (Adult)  Goal: Absence of Falls  Outcome: Ongoing (interventions implemented as appropriate)

## 2017-07-09 NOTE — PLAN OF CARE
Problem: Patient Care Overview (Adult)  Goal: Plan of Care Review  Outcome: Ongoing (interventions implemented as appropriate)    07/09/17 1253   Coping/Psychosocial Response Interventions   Plan Of Care Reviewed With patient   Patient Care Overview   Progress improving   Outcome Evaluation   Outcome Summary/Follow up Plan Pt demonstrated improved activity tolerance by ambulating 70 ft w/ CGAx1 and RW. Pt conts to require assist for balance and is dependent for post toilet hygiene. Recommend cont skilled IPOT POC.        Goal: Adult Individualization and Mutuality  Outcome: Ongoing (interventions implemented as appropriate)    Problem: Fall Risk (Adult)  Goal: Identify Related Risk Factors and Signs and Symptoms  Outcome: Ongoing (interventions implemented as appropriate)  Goal: Absence of Falls  Outcome: Ongoing (interventions implemented as appropriate)    Problem: Skin Integrity Impairment, Risk/Actual (Adult)  Goal: Identify Related Risk Factors and Signs and Symptoms  Outcome: Ongoing (interventions implemented as appropriate)    Problem: Cardiac Output, Decreased (Adult)  Goal: Identify Related Risk Factors and Signs and Symptoms  Outcome: Ongoing (interventions implemented as appropriate)  Goal: Adequate Cardiac Output/Effective Tissue Perfusion  Outcome: Ongoing (interventions implemented as appropriate)

## 2017-07-09 NOTE — PROGRESS NOTES
[facility] Medicine Services  INPATIENT PROGRESS NOTE    Date of Admission: 7/5/2017  Length of Stay: 3  Primary Care Physician: No Known Provider    Subjective-- HPI/Events overnight/ROS/CC- Hospital follow visit.  Detailed ROS not detailed as performed below      No significant acute events reported from overnight.  Patient is sleeping and appears comfortable.  He is easily arousable.  He denies any new complaints.    Objective      Temp:  [98.1 °F (36.7 °C)-98.5 °F (36.9 °C)] 98.1 °F (36.7 °C)  Heart Rate:  [69-81] 81  Resp:  [16-18] 16  BP: (127-142)/() 142/100    Physical Exam:  Physical Exam    NAD  RRR  CTAB  Abd soft and NT  LE edema stable and unchanged- scattered erythematous lesion on legs are stable. Ulcerations on bilat great toe and 3rd digits are stable.  Nonfocal     Results Review:    I have reviewed the labs, radiology results and diagnostic studies.      Results from last 7 days  Lab Units 07/05/17  2047   WBC 10*3/mm3 9.35   HEMOGLOBIN g/dL 13.0*   PLATELETS 10*3/mm3 235       Results from last 7 days  Lab Units 07/09/17  0636   SODIUM mmol/L 137   POTASSIUM mmol/L 3.2*   CO2 mmol/L 26.0   CREATININE mg/dL 1.30   GLUCOSE mg/dL 149*       Culture Data:  Radiology Data:     I have reviewed the medications.        Assessment/Plan     Assessment/Problem List  70 yo M, non-smoker, with history CAD, HTN, HLD, and DM2, who is noncompliant with medical therapy, reportedly due to financial difficulties, came in with worsening LE swelling and pain, +constant CP/dyspnea. He also reported a recent fall and c/o back pain/R rib cage pain.    Principal Problem:    Acute on chronic systolic heart failure  Active Problems:    Peripheral edema    Coronary artery disease of native artery of native heart with stable angina pectoris    Essential hypertension    Hyperlipidemia LDL goal <70    DM (diabetes mellitus)    Bilateral leg ulcer    CKD (chronic kidney disease)    Tick bite     Peripheral neuropathy    Cellulitis-legs/feet with chronic ulcer- no osteomyelitis on MRI      Plan    - Clinically improving, creatinine has come back down after naproxen was discontinued.  - Continue to diurese per cardiology  - Arterial duplex of the lower extremity is still pending  - Ulcers on both feet are likely due to poor fitting shoes, will see if PT can get him a pair of shoes at discharge.  -  to assess home environment/safety  - Based on PTs evaluation, patient would benefit from rehabilitation.  Given that patient has been falling at home, I feel that patient would benefit and have recommended it to him.   to continue to follow and submitted referrals to SNF if patient agrees.  - Continue doxycycline for tick borne illness      Monet Kent MD   07/09/17   1:53 PM    Please note that portions of this note may have been completed with a voice recognition program. Efforts were made to edit the dictations, but occasionally words are mistranscribed.

## 2017-07-09 NOTE — THERAPY TREATMENT NOTE
Acute Care - Occupational Therapy Treatment Note  [facility]     Patient Name: Luis F Plummer  : 1945  MRN: [id number]  Today's Date: 2017  Onset of Illness/Injury or Date of Surgery Date: 17  Date of Referral to OT: 17  Referring Physician: FRANCO Irizarry      Admit Date: 2017    Visit Dx:     ICD-10-CM ICD-9-CM   1. Peripheral edema R60.9 782.3   2. Pleural effusion J90 511.9   3. Acute pulmonary edema J81.0 518.4   4. Acute on chronic systolic congestive heart failure I50.23 428.23     428.0   5. Hyperlipidemia LDL goal <70 E78.5 272.4   6. Acute on chronic systolic heart failure I50.23 428.23   7. Coronary artery disease of native artery of native heart with stable angina pectoris I25.118 414.01     413.9   8. Impaired mobility and ADLs Z74.09 799.89   9. Impaired functional mobility, balance, gait, and endurance Z74.09 V49.89     Patient Active Problem List   Diagnosis   • Peripheral edema   • Acute on chronic systolic heart failure   • Coronary artery disease of native artery of native heart with stable angina pectoris   • Essential hypertension   • Hyperlipidemia LDL goal <70   • DM (diabetes mellitus)   • Bilateral leg ulcer   • CKD (chronic kidney disease)   • Tick bite   • Peripheral neuropathy   • Cellulitis-legs/feet with chronic ulcer             Adult Rehabilitation Note       17 1129 17 1128       Rehab Assessment/Intervention    Discipline occupational therapist  -CL physical therapist  -LS     Document Type therapy note (daily note)  -CL therapy note (daily note)  -LS     Subjective Information agree to therapy;complains of;pain  -CL agree to therapy;complains of;pain  -LS     Patient Effort, Rehab Treatment good  -CL good  -LS     Symptoms Noted During/After Treatment fatigue  -CL fatigue  -LS     Precautions/Limitations fall precautions;other (see comments)   Exit alarm  -CL fall precautions  -LS     Specific Treatment Considerations Pt [place].   -CL Pt  [place].  -LS     Recorded by [CL] Basilia Gautam OT [LS] Shae Robledo, PT     Vital Signs    Pre Systolic BP Rehab 142  -   RN aware; gave consent for therapy  -LS     Pre Treatment Diastolic    RN aware, cleared for therapy.   -  -LS     Pretreatment Heart Rate (beats/min)  76  -LS     O2 Delivery Pre Treatment  room air  -LS     O2 Delivery Post Treatment  room air  -LS     Pre Patient Position  Supine  -LS     Intra Patient Position  Standing  -LS     Post Patient Position  Sitting  -LS     Recorded by [CL] Basilia Gautam OT [LS] Shae Robledo, PT     Pain Assessment    Pain Assessment 0-10  -CL 0-10  -LS     Pain Score 8  -CL 8  -LS     Post Pain Score 8  -CL 8  -LS     Pain Location Leg  -CL Leg  -LS     Pain Orientation Right;Left  -CL Right;Left  -LS     Pain Intervention(s) Repositioned;Ambulation/increased activity  -CL Repositioned;Ambulation/increased activity  -LS     Response to Interventions Tolerated, RN notified.   -CL tolerated; notified RN  -LS     Recorded by [CL] Basilia Gautam OT [LS] Shae Robledo, PT     Cognitive Assessment/Intervention    Current Cognitive/Communication Assessment functional  -CL functional  -LS     Orientation Status oriented to;person;place;required verbal cueing (specifiy in comments);time  -CL oriented to;person;place;situation   cues for time of day  -LS     Follows Commands/Answers Questions 100% of the time;needs increased time;needs repetition;needs cueing  -CL able to follow single-step instructions;100% of the time;needs cueing  -LS     Personal Safety mild impairment;decreased awareness, need for assist;decreased awareness, need for safety  -CL mild impairment;decreased awareness, need for assist;decreased awareness, need for safety;decreased insight to deficits  -LS     Personal Safety Interventions fall prevention program maintained;gait belt;nonskid shoes/slippers when out of bed  -CL fall prevention program maintained;gait belt;nonskid shoes/slippers  when out of bed  -LS     Recorded by [CL] Basilia Gautam OT [LS] Shae Rboledo, PT     Bed Mobility, Assessment/Treatment    Bed Mobility, Assistive Device head of bed elevated  -CL head of bed elevated  -LS     Bed Mob, Supine to Sit, [place] minimum assist (75% patient effort);verbal cues required  -CL minimum assist (75% patient effort);verbal cues required  -LS     Bed Mob, Sit to Supine, [place]  not tested  -LS     Bed Mobility, Comment VCs for HP/sequencing.   -CL      Recorded by [CL] Basilia Gautam OT [LS] Shae Robledo, PT     Transfer Assessment/Treatment    Transfers, Sit-Stand [place] minimum assist (75% patient effort);verbal cues required  -CL minimum assist (75% patient effort);verbal cues required  -LS     Transfers, Stand-Sit [place] minimum assist (75% patient effort);verbal cues required  -CL minimum assist (75% patient effort);verbal cues required  -LS     Transfers, Sit-Stand-Sit, Assist Device rolling walker  -CL rolling walker  -LS     Toilet Transfer, [place] contact guard assist;verbal cues required  -CL contact guard assist  -LS     Toilet Transfer, Assistive Device rolling walker;bedside commode without drop arms  -CL rolling walker  -LS     Transfer, Impairments  strength decreased;impaired balance  -LS     Transfer, Comment VCs for HP.   -CL Min A for initial stand from EOB; pt progressed to CGA with stand from C. VC's for hand placement.   -LS     Recorded by [CL] Basilia Gautam OT [LS] Shae Robledo, PT     Gait Assessment/Treatment    Gait, [place] Level  contact guard assist;verbal cues required  -LS     Gait, Assistive Device  rolling walker  -LS     Gait, Distance (Feet)  70  -LS     Gait, Gait Deviations  [place] decreased;forward flexed posture;step length decreased  -LS     Gait, Impairments  pain;strength decreased  -LS     Gait, Comment  VC's for posture and increasing step length within RWx; distance limited by fatigue.   -LS     Recorded by  [LS]  Shae Robledo, PT     Functional Mobility    Functional Mobility- Ind. Level contact guard assist;verbal cues required  -CL      Functional Mobility- Device rolling walker  -CL      Functional Mobility-Distance (Feet) 70  -CL      Functional Mobility- Comment VCs for posture.   -CL      Recorded by [CL] Basilia Gautam OT      Toileting Assessment/Training    Toileting Assess/Train, Position standing  -CL      Toileting Assess/Train, Indepen Level dependent (less than 25% patient effort)  -CL      Toileting Assess/Train, Comment Clothing management and post toilet hygiene.   -CL      Recorded by [CL] Basilia Gautam OT      Motor Skills/Interventions    Additional Documentation Balance Skills Training (Group)  -CL Balance Skills Training (Group)  -LS     Recorded by [CL] Basilia Gautam OT [LS] Shae Robledo, PT     Balance Skills Training    Sitting-Level of Assistance Distant supervision  -CL Independent  -LS     Sitting-Balance Support Right upper extremity supported;Left upper extremity supported;Feet supported  -CL Feet supported  -LS     Sitting-Balance Activities Forward lean;Reaching for objects;Trunk control activities  -CL      Standing-Level of Assistance Contact guard  -CL Contact guard  -LS     Static Standing Balance Support assistive device  -CL assistive device  -LS     Standing-Balance Activities Weight Shift A-P;Weight Shift R-L  -CL Weight Shift A-P  -LS     Standing Balance # of Minutes  2   standing at [facility] for hygiene  -LS     Gait Balance-Level of Assistance Contact guard  -CL Contact guard  -LS     Gait Balance Support assistive device  -CL assistive device  -LS     Recorded by [CL] Basilia Gautam OT [LS] Shae Robledo, PT     Therapy Exercises    Bilateral Lower Extremities  AROM:;10 reps;sitting;ankle pumps/circles;hip flexion;LAQ  -LS     Recorded by  [LS] Shae Robledo, PT     Positioning and Restraints    Pre-Treatment Position in bed  -CL in bed  -LS     Post Treatment Position chair  -CL chair   -LS     In Chair notified nsg;reclined;call light within reach;encouraged to call for assist;exit alarm on;legs elevated  -CL notified nsg;reclined;call light within reach;encouraged to call for assist;exit alarm on;legs elevated  -LS     Recorded by [CL] Basilia Gautam, OT [LS] Shae Robledo, PT       User Key  (r) = Recorded By, (t) = Taken By, (c) = Cosigned By    Initials Name Effective Dates    LS Shae Robledo, PT 06/19/15 -     CL Basilia Gautam, OT 06/08/16 -                 OT Goals       07/09/17 1253 07/06/17 1401       Bed Mobility OT LTG    Bed Mobility OT LTG, Date Established  07/06/17  -JR     Bed Mobility OT LTG, Time to Achieve  1 wk  -JR     Bed Mobility OT LTG, Activity Type  all bed mobility  -JR     Bed Mobility OT LTG, [place] Level  conditional independence  -JR     Bed Mobility OT LTG, Outcome goal ongoing  -CL      Transfer Training OT LTG    Transfer Training OT LTG, Date Established  07/06/17  -JR     Transfer Training OT LTG, Time to Achieve  1 wk  -JR     Transfer Training OT LTG, Activity Type  bed to chair /chair to bed  -JR     Transfer Training OT LTG, [place] Level  supervision required  -JR     Transfer Training OT LTG, Assist Device  other (see comments)   with appropriate AD  -JR     Transfer Training OT LTG, Outcome goal ongoing  -CL      LB Dressing OT LTG    LB Dressing Goal OT LTG, Date Established  07/06/17  -JR     LB Dressing Goal OT LTG, Time to Achieve  1 wk  -JR     LB Dressing Goal OT LTG, Activity Type  alvin/doff B socks  -JR     LB Dressing Goal OT LTG, [place] Level  moderate assist (50% patient effort)  -JR     LB Dressing Goal OT LTG, Adaptive Equipment  reacher;sock-aid  -JR     LB Dressing Goal OT LTG, Outcome goal ongoing  -CL        User Key  (r) = Recorded By, (t) = Taken By, (c) = Cosigned By    Initials Name Provider Type    JR Hilary Bolden OT Occupational Therapist    CL Basilia Gautam, OT Occupational Therapist          Occupational Therapy  Education     Title: PT OT SLP Therapies (Active)     Topic: Occupational Therapy (Active)     Point: ADL training (Active)    Description: Instruct learner(s) on proper safety adaptation and remediation techniques during self care or transfers.   Instruct in proper use of assistive devices.    Learning Progress Summary    Learner Readiness Method Response Comment Documented by Status   Patient Acceptance E,D NR Pt educated on appropriate safety precautions, t/f techniques, and benefits of therapy.  07/09/17 1252 Active    Acceptance E NR Educated pt on appropriate transfer techniques, appropiate safety precautions, role of therapy and benefits of activity.  07/06/17 1359 Active               Point: Precautions (Active)    Description: Instruct learner(s) on prescribed precautions during self-care and functional transfers.    Learning Progress Summary    Learner Readiness Method Response Comment Documented by Status   Patient Acceptance E,D NR Pt educated on appropriate safety precautions, t/f techniques, and benefits of therapy.  07/09/17 1252 Active               Point: Body mechanics (Active)    Description: Instruct learner(s) on proper positioning and spine alignment during self-care, functional mobility activities and/or exercises.    Learning Progress Summary    Learner Readiness Method Response Comment Documented by Status   Patient Acceptance E,D NR Pt educated on appropriate safety precautions, t/f techniques, and benefits of therapy.  07/09/17 1252 Active                      User Key     Initials Effective Dates Name Provider Type Discipline     06/22/15 -  Hilary Bolden OT Occupational Therapist OT     06/08/16 -  Basilia Gautam OT Occupational Therapist OT                  OT Recommendation and Plan  Anticipated Equipment Needs At Discharge: bedside commode, tub bench  Anticipated Discharge Disposition: inpatient rehabilitation facility  Planned Therapy Interventions: ADL retraining,  adaptive equipment training, balance training, bed mobility training, transfer training  Therapy Frequency: daily  Plan of Care Review  Plan Of Care Reviewed With: patient  Progress: improving  Outcome Summary/Follow up Plan: Pt demonstrated improved activity tolerance by ambulating 70 ft w/ CGAx1 and RW. Pt conts to require assist for balance and is dependent for post toilet hygiene. Recommend cont skilled IPOT POC.         Outcome Measures       07/09/17 1129 07/09/17 1128 07/06/17 1311    How much help from another person do you currently need...    Turning from your back to your side while in flat bed without using bedrails?  4  -LS     Moving from lying on back to sitting on the side of a flat bed without bedrails?  3  -LS     Moving to and from a bed to a chair (including a wheelchair)?  3  -LS     Standing up from a chair using your arms (e.g., wheelchair, bedside chair)?  3  -LS     Climbing 3-5 steps with a railing?  2  -LS     To walk in hospital room?  3  -LS     AM-PAC 6 Clicks Score  18  -LS     How much help from another is currently needed...    Putting on and taking off regular lower body clothing? 1  -CL  1  -JR    Bathing (including washing, rinsing, and drying) 2  -CL  2  -JR    Toileting (which includes using toilet bed pan or urinal) 2  -CL  2  -JR    Putting on and taking off regular upper body clothing 2  -CL  3  -JR    Taking care of personal grooming (such as brushing teeth) 4  -CL  4  -JR    Eating meals 4  -CL  4  -JR    Score 15  -CL  16  -JR    Functional Assessment    Outcome Measure Options AM-PAC 6 Clicks Daily Activity (OT)  -CL AM-PAC 6 Clicks Basic Mobility (PT)  -LS AM-PAC 6 Clicks Daily Activity (OT)  -JR      07/06/17 1310          How much help from another person do you currently need...    Turning from your back to your side while in flat bed without using bedrails? 4  -SJ      Moving from lying on back to sitting on the side of a flat bed without bedrails? 4  -SJ      Moving  to and from a bed to a chair (including a wheelchair)? 3  -SJ      Standing up from a chair using your arms (e.g., wheelchair, bedside chair)? 3  -SJ      Climbing 3-5 steps with a railing? 2  -SJ      To walk in hospital room? 3  -SJ      AM-PAC 6 Clicks Score 19  -SJ      Functional Assessment    Outcome Measure Options AM-PAC 6 Clicks Basic Mobility (PT)  -SJ        User Key  (r) = Recorded By, (t) = Taken By, (c) = Cosigned By    Initials Name Provider Type    SJ Sakshi Gutierrez, PT Physical Therapist    JR Hilary Bolden, OT Occupational Therapist    LS Shae Robledo, PT Physical Therapist    CL Basilia Gautam, OT Occupational Therapist           Time Calculation:         Time Calculation- OT       07/09/17 1255          Time Calculation- OT    OT Start Time 1129  -CL      Total Timed Code Minutes- OT 12 minute(s)  -CL      OT Received On 07/09/17  -CL      OT Goal Re-Cert Due Date 07/16/17  -CL        User Key  (r) = Recorded By, (t) = Taken By, (c) = Cosigned By    Initials Name Provider Type    CL Basilia Gautam OT Occupational Therapist           Therapy Charges for Today     Code Description Service Date Service Provider Modifiers Qty    [id number]  OT THERAPEUTIC ACT EA 15 MIN 7/9/2017 Basilia Gautam OT GO 1               Basilia Gautam OT  7/9/2017

## 2017-07-10 PROBLEM — Z91.199 NONCOMPLIANCE: Status: ACTIVE | Noted: 2017-07-10

## 2017-07-10 PROBLEM — I21.3 STEMI (ST ELEVATION MYOCARDIAL INFARCTION) (HCC): Status: RESOLVED | Noted: 2017-07-10 | Resolved: 2017-07-10

## 2017-07-10 PROBLEM — I21.3 STEMI (ST ELEVATION MYOCARDIAL INFARCTION) (HCC): Status: ACTIVE | Noted: 2017-07-10

## 2017-07-10 PROBLEM — I25.5 ISCHEMIC CARDIOMYOPATHY: Status: ACTIVE | Noted: 2017-07-10

## 2017-07-10 LAB
ANION GAP SERPL CALCULATED.3IONS-SCNC: 7 MMOL/L (ref 3–11)
BASOPHILS # BLD AUTO: 0.02 10*3/MM3 (ref 0–0.2)
BASOPHILS NFR BLD AUTO: 0.3 % (ref 0–1)
BNP SERPL-MCNC: 1817 PG/ML (ref 0–100)
BUN BLD-MCNC: 25 MG/DL (ref 9–23)
BUN/CREAT SERPL: 19.2 (ref 7–25)
CALCIUM SPEC-SCNC: 8.6 MG/DL (ref 8.7–10.4)
CHLORIDE SERPL-SCNC: 98 MMOL/L (ref 99–109)
CO2 SERPL-SCNC: 32 MMOL/L (ref 20–31)
CREAT BLD-MCNC: 1.3 MG/DL (ref 0.6–1.3)
DEPRECATED RDW RBC AUTO: 53.7 FL (ref 37–54)
EOSINOPHIL # BLD AUTO: 0.19 10*3/MM3 (ref 0–0.3)
EOSINOPHIL NFR BLD AUTO: 2.7 % (ref 0–3)
ERYTHROCYTE [DISTWIDTH] IN BLOOD BY AUTOMATED COUNT: 16.4 % (ref 11.3–14.5)
GFR SERPL CREATININE-BSD FRML MDRD: 54 ML/MIN/1.73
GLUCOSE BLD-MCNC: 150 MG/DL (ref 70–100)
GLUCOSE BLDC GLUCOMTR-MCNC: 132 MG/DL (ref 70–130)
GLUCOSE BLDC GLUCOMTR-MCNC: 154 MG/DL (ref 70–130)
GLUCOSE BLDC GLUCOMTR-MCNC: 157 MG/DL (ref 70–130)
GLUCOSE BLDC GLUCOMTR-MCNC: 194 MG/DL (ref 70–130)
HCT VFR BLD AUTO: 35.9 % (ref 38.9–50.9)
HGB BLD-MCNC: 11.3 G/DL (ref 13.1–17.5)
IMM GRANULOCYTES # BLD: 0.02 10*3/MM3 (ref 0–0.03)
IMM GRANULOCYTES NFR BLD: 0.3 % (ref 0–0.6)
LYMPHOCYTES # BLD AUTO: 0.72 10*3/MM3 (ref 0.6–4.8)
LYMPHOCYTES NFR BLD AUTO: 10.1 % (ref 24–44)
MCH RBC QN AUTO: 28 PG (ref 27–31)
MCHC RBC AUTO-ENTMCNC: 31.5 G/DL (ref 32–36)
MCV RBC AUTO: 88.9 FL (ref 80–99)
MONOCYTES # BLD AUTO: 0.96 10*3/MM3 (ref 0–1)
MONOCYTES NFR BLD AUTO: 13.4 % (ref 0–12)
NEUTROPHILS # BLD AUTO: 5.24 10*3/MM3 (ref 1.5–8.3)
NEUTROPHILS NFR BLD AUTO: 73.2 % (ref 41–71)
PLATELET # BLD AUTO: 174 10*3/MM3 (ref 150–450)
PMV BLD AUTO: 10.4 FL (ref 6–12)
POTASSIUM BLD-SCNC: 3.6 MMOL/L (ref 3.5–5.5)
RBC # BLD AUTO: 4.04 10*6/MM3 (ref 4.2–5.76)
SODIUM BLD-SCNC: 137 MMOL/L (ref 132–146)
TROPONIN I SERPL-MCNC: 0.03 NG/ML
TROPONIN I SERPL-MCNC: 0.03 NG/ML
WBC NRBC COR # BLD: 7.15 10*3/MM3 (ref 3.5–10.8)

## 2017-07-10 PROCEDURE — 85025 COMPLETE CBC W/AUTO DIFF WBC: CPT | Performed by: HOSPITALIST

## 2017-07-10 PROCEDURE — 82962 GLUCOSE BLOOD TEST: CPT

## 2017-07-10 PROCEDURE — 99232 SBSQ HOSP IP/OBS MODERATE 35: CPT | Performed by: HOSPITALIST

## 2017-07-10 PROCEDURE — 80048 BASIC METABOLIC PNL TOTAL CA: CPT | Performed by: HOSPITALIST

## 2017-07-10 PROCEDURE — 25010000002 HEPARIN (PORCINE) PER 1000 UNITS: Performed by: NURSE PRACTITIONER

## 2017-07-10 PROCEDURE — 84484 ASSAY OF TROPONIN QUANT: CPT | Performed by: INTERNAL MEDICINE

## 2017-07-10 PROCEDURE — 63710000001 INSULIN DETEMIR PER 5 UNITS: Performed by: HOSPITALIST

## 2017-07-10 PROCEDURE — 99232 SBSQ HOSP IP/OBS MODERATE 35: CPT | Performed by: INTERNAL MEDICINE

## 2017-07-10 PROCEDURE — 97110 THERAPEUTIC EXERCISES: CPT

## 2017-07-10 PROCEDURE — 83880 ASSAY OF NATRIURETIC PEPTIDE: CPT | Performed by: INTERNAL MEDICINE

## 2017-07-10 RX ORDER — DOXYCYCLINE HYCLATE 100 MG/1
100 CAPSULE ORAL EVERY 12 HOURS SCHEDULED
Status: DISCONTINUED | OUTPATIENT
Start: 2017-07-10 | End: 2017-07-21 | Stop reason: HOSPADM

## 2017-07-10 RX ADMIN — HEPARIN SODIUM 5000 UNITS: 5000 INJECTION, SOLUTION INTRAVENOUS; SUBCUTANEOUS at 21:10

## 2017-07-10 RX ADMIN — DOXYCYCLINE 100 MG: 100 INJECTION, POWDER, LYOPHILIZED, FOR SOLUTION INTRAVENOUS at 00:34

## 2017-07-10 RX ADMIN — NYSTATIN: 100000 POWDER TOPICAL at 21:10

## 2017-07-10 RX ADMIN — CARVEDILOL 3.12 MG: 3.12 TABLET, FILM COATED ORAL at 08:03

## 2017-07-10 RX ADMIN — BUMETANIDE 1 MG/HR: 0.25 INJECTION INTRAMUSCULAR; INTRAVENOUS at 10:53

## 2017-07-10 RX ADMIN — HEPARIN SODIUM 5000 UNITS: 5000 INJECTION, SOLUTION INTRAVENOUS; SUBCUTANEOUS at 08:03

## 2017-07-10 RX ADMIN — PANTOPRAZOLE SODIUM 40 MG: 40 TABLET, DELAYED RELEASE ORAL at 05:25

## 2017-07-10 RX ADMIN — BUMETANIDE 1 MG: 1 TABLET ORAL at 08:04

## 2017-07-10 RX ADMIN — LOSARTAN POTASSIUM 50 MG: 50 TABLET, FILM COATED ORAL at 08:04

## 2017-07-10 RX ADMIN — BUMETANIDE 1 MG/HR: 0.25 INJECTION INTRAMUSCULAR; INTRAVENOUS at 23:19

## 2017-07-10 RX ADMIN — LEVOTHYROXINE SODIUM 50 MCG: 50 TABLET ORAL at 05:25

## 2017-07-10 RX ADMIN — CARVEDILOL 3.12 MG: 3.12 TABLET, FILM COATED ORAL at 21:11

## 2017-07-10 RX ADMIN — INSULIN DETEMIR 7 UNITS: 100 INJECTION, SOLUTION SUBCUTANEOUS at 21:11

## 2017-07-10 RX ADMIN — NYSTATIN: 100000 POWDER TOPICAL at 08:02

## 2017-07-10 RX ADMIN — DOXYCYCLINE HYCLATE 100 MG: 100 CAPSULE ORAL at 21:11

## 2017-07-10 RX ADMIN — ASPIRIN 81 MG: 81 TABLET, COATED ORAL at 08:04

## 2017-07-10 RX ADMIN — INSULIN LISPRO 2 UNITS: 100 INJECTION, SOLUTION INTRAVENOUS; SUBCUTANEOUS at 08:03

## 2017-07-10 RX ADMIN — ATORVASTATIN CALCIUM 20 MG: 20 TABLET, FILM COATED ORAL at 21:11

## 2017-07-10 RX ADMIN — INSULIN LISPRO 2 UNITS: 100 INJECTION, SOLUTION INTRAVENOUS; SUBCUTANEOUS at 12:42

## 2017-07-10 RX ADMIN — INSULIN LISPRO 2 UNITS: 100 INJECTION, SOLUTION INTRAVENOUS; SUBCUTANEOUS at 17:23

## 2017-07-10 NOTE — THERAPY TREATMENT NOTE
Acute Care - Physical Therapy Treatment Note  [facility]     Patient Name: Luis F Plummer  : 1945  MRN: [id number]  Today's Date: 7/10/2017  Onset of Illness/Injury or Date of Surgery Date: 17  Date of Referral to PT: 17  Referring Physician: FRANCO Irizarry    Admit Date: 2017    Visit Dx:    ICD-10-CM ICD-9-CM   1. Peripheral edema R60.9 782.3   2. Pleural effusion J90 511.9   3. Acute pulmonary edema J81.0 518.4   4. Acute on chronic systolic congestive heart failure I50.23 428.23     428.0   5. Hyperlipidemia LDL goal <70 E78.5 272.4   6. Acute on chronic systolic heart failure I50.23 428.23   7. Coronary artery disease of native artery of native heart with stable angina pectoris I25.118 414.01     413.9   8. Impaired mobility and ADLs Z74.09 799.89   9. Impaired functional mobility, balance, gait, and endurance Z74.09 V49.89     Patient Active Problem List   Diagnosis   • Peripheral edema   • Acute on chronic systolic heart failure   • Coronary artery disease of native artery of native heart with stable angina pectoris   • Essential hypertension   • Hyperlipidemia LDL goal <70   • DM (diabetes mellitus)   • Bilateral leg ulcer   • CKD (chronic kidney disease)   • Tick bite   • Peripheral neuropathy   • Cellulitis-legs/feet with chronic ulcer- no osteomyelitis on MRI               Adult Rehabilitation Note       07/10/17 1055 17 1129 17 1128    Rehab Assessment/Intervention    Discipline (P)  physical therapist  -KR occupational therapist  -CL physical therapist  -LS    Document Type (P)  therapy note (daily note)  -KR therapy note (daily note)  -CL therapy note (daily note)  -LS    Subjective Information (P)  agree to therapy;no complaints  -KR agree to therapy;complains of;pain  -CL agree to therapy;complains of;pain  -LS    Patient Effort, Rehab Treatment (P)  good  -KR good  -CL good  -LS    Symptoms Noted During/After Treatment (P)  fatigue  -KR fatigue  -CL fatigue   -LS    Precautions/Limitations (P)  fall precautions  -KR fall precautions;other (see comments)   Exit alarm  -CL fall precautions  -LS    Specific Treatment Considerations  Pt Nikolai.   -CL Pt Nikolai.  -LS    Recorded by [KR] Vijaya Shen, PT Student [CL] Basilia Gautam OT [LS] Shae Robledo, PT    Vital Signs    Pre Systolic BP Rehab (P)  119  -  -   RN aware; gave consent for therapy  -LS    Pre Treatment Diastolic BP (P)  80  -   RN aware, cleared for therapy.   -  -LS    Post Systolic BP Rehab (P)  115  -KR      Post Treatment Diastolic BP (P)  74  -KR      Pretreatment Heart Rate (beats/min) (P)  73  -KR  76  -LS    Posttreatment Heart Rate (beats/min) (P)  73  -KR      Pre SpO2 (%) (P)  99  -KR      O2 Delivery Pre Treatment (P)  supplemental O2  -KR  room air  -LS    Post SpO2 (%) (P)  99  -KR      O2 Delivery Post Treatment (P)  supplemental O2  -KR  room air  -LS    Pre Patient Position (P)  Supine  -KR  Supine  -LS    Intra Patient Position (P)  Standing  -KR  Standing  -LS    Post Patient Position (P)  Sitting  -KR  Sitting  -LS    Recorded by [KR] Vijaya Shen, PT Student [CL] Basilia Gautam, NANCI [LS] Shae Robledo, PT    Pain Assessment    Pain Assessment (P)  0-10  -KR 0-10  -CL 0-10  -LS    Pain Score (P)  0  -KR 8  -CL 8  -LS    Post Pain Score (P)  0  -KR 8  -CL 8  -LS    Pain Location  Leg  -CL Leg  -LS    Pain Orientation  Right;Left  -CL Right;Left  -LS    Pain Intervention(s)  Repositioned;Ambulation/increased activity  -CL Repositioned;Ambulation/increased activity  -LS    Response to Interventions  Tolerated, RN notified.   -CL tolerated; notified RN  -LS    Recorded by [KR] Vijaya Shen, PT Student [CL] Basilia Gautam OT [LS] Shae Robledo, PT    Cognitive Assessment/Intervention    Current Cognitive/Communication Assessment (P)  functional  -KR functional  -CL functional  -LS    Orientation Status (P)  oriented to;person;place  -KR oriented to;person;place;required verbal  cueing (specifiy in comments);time  -CL oriented to;person;place;situation   cues for time of day  -LS    Follows Commands/Answers Questions (P)  able to follow single-step instructions;100% of the time;needs cueing;needs increased time;needs repetition  -% of the time;needs increased time;needs repetition;needs cueing  -CL able to follow single-step instructions;100% of the time;needs cueing  -LS    Personal Safety (P)  mild impairment;decreased awareness, need for assist;decreased awareness, need for safety;decreased insight to deficits  -KR mild impairment;decreased awareness, need for assist;decreased awareness, need for safety  -CL mild impairment;decreased awareness, need for assist;decreased awareness, need for safety;decreased insight to deficits  -LS    Personal Safety Interventions (P)  fall prevention program maintained;gait belt;nonskid shoes/slippers when out of bed  -KR fall prevention program maintained;gait belt;nonskid shoes/slippers when out of bed  -CL fall prevention program maintained;gait belt;nonskid shoes/slippers when out of bed  -LS    Recorded by [KR] Vijaya Shen, PT Student [CL] Basilia Gautam, OT [LS] Shae Robledo, PT    Bed Mobility, Assessment/Treatment    Bed Mobility, Assistive Device  head of bed elevated  -CL head of bed elevated  -LS    Bed Mob, Supine to Sit, [place] (P)  supervision required  -KR minimum assist (75% patient effort);verbal cues required  -CL minimum assist (75% patient effort);verbal cues required  -LS    Bed Mob, Sit to Supine, [place]   not tested  -LS    Bed Mobility, Comment (P)  VC's for sequencing  -KR VCs for HP/sequencing.   -CL     Recorded by [KR] Vijaya Shen, PT Student [CL] Basilia Gautam OT [LS] Shae Robledo, PT    Transfer Assessment/Treatment    Transfers, Sit-Stand [place] (P)  minimum assist (75% patient effort);verbal cues required  -KR minimum assist (75% patient effort);verbal cues required  -CL minimum assist (75%  patient effort);verbal cues required  -LS    Transfers, Stand-Sit [place] (P)  minimum assist (75% patient effort);verbal cues required  -KR minimum assist (75% patient effort);verbal cues required  -CL minimum assist (75% patient effort);verbal cues required  -LS    Transfers, Sit-Stand-Sit, Assist Device  rolling walker  -CL rolling walker  -LS    Toilet Transfer, [place]  contact guard assist;verbal cues required  -CL contact guard assist  -LS    Toilet Transfer, Assistive Device  rolling walker;bedside commode without drop arms  -CL rolling walker  -LS    Transfer, Impairments (P)  strength decreased;impaired balance  -KR  strength decreased;impaired balance  -LS    Transfer, Comment (P)  Pt given VC's for hand placement.   -KR VCs for HP.   -CL Min A for initial stand from EOB; pt progressed to CGA with stand from BSC. VC's for hand placement.   -LS    Recorded by [KR] Vijaya Shen, PT Student [CL] Basilia Gautam OT [LS] Shae Robledo, PT    Gait Assessment/Treatment    Gait, [place] Level (P)  contact guard assist;verbal cues required  -KR  contact guard assist;verbal cues required  -LS    Gait, Assistive Device (P)  rolling walker  -KR  rolling walker  -LS    Gait, Distance (Feet) (P)  150  -KR  70  -LS    Gait, Gait Pattern Analysis (P)  swing-to gait  -KR      Gait, Gait Deviations (P)  [place] decreased;forward flexed posture;step length decreased;weight-shifting ability decreased  -KR  [place] decreased;forward flexed posture;step length decreased  -LS    Gait, Safety Issues (P)  step length decreased;balance decreased during turns;supplemental O2  -KR      Gait, Impairments (P)  strength decreased;impaired balance  -KR  pain;strength decreased  -LS    Gait, Comment (P)  VC's for upright posture. Pt's gait slow and labored. 2 standing rest breaks.  -KR  VC's for posture and increasing step length within RWx; distance limited by fatigue.   -LS    Recorded by [KR] Vijaya Shen, PT Student   [LS] Shae Robledo, PT    Functional Mobility    Functional Mobility- Ind. Level  contact guard assist;verbal cues required  -CL     Functional Mobility- Device  rolling walker  -CL     Functional Mobility-Distance (Feet)  70  -CL     Functional Mobility- Comment  VCs for posture.   -CL     Recorded by  [CL] Basilia Gautam OT     Toileting Assessment/Training    Toileting Assess/Train, Position  standing  -CL     Toileting Assess/Train, Indepen Level  dependent (less than 25% patient effort)  -CL     Toileting Assess/Train, Comment  Clothing management and post toilet hygiene.   -CL     Recorded by  [CL] Basilia Gautam OT     Motor Skills/Interventions    Additional Documentation (P)  Balance Skills Training (Group)  -KR Balance Skills Training (Group)  -CL Balance Skills Training (Group)  -LS    Recorded by [KR] Vijaya Shen, PT Student [CL] Basilia Gautam OT [LS] Shae Robledo, PT    Balance Skills Training    Sitting-Level of Assistance (P)  Distant supervision  -KR Distant supervision  -CL Independent  -LS    Sitting-Balance Support (P)  Right upper extremity supported;Left upper extremity supported;Feet supported  -KR Right upper extremity supported;Left upper extremity supported;Feet supported  -CL Feet supported  -LS    Sitting-Balance Activities  Forward lean;Reaching for objects;Trunk control activities  -CL     Standing-Level of Assistance (P)  Contact guard  -KR Contact guard  -CL Contact guard  -LS    Static Standing Balance Support (P)  assistive device  -KR assistive device  -CL assistive device  -LS    Standing-Balance Activities  Weight Shift A-P;Weight Shift R-L  -CL Weight Shift A-P  -LS    Standing Balance # of Minutes   2   standing at [employer] [facility] for hygiene  -LS    Gait Balance-Level of Assistance  Contact guard  -CL Contact guard  -LS    Gait Balance Support  assistive device  -CL assistive device  -LS    Recorded by [KR] Vijaya Shen, PT Student [CL] Basilia Gautam, OT [LS] Shae Robledo, PT    Therapy  Exercises    Bilateral Lower Extremities   AROM:;10 reps;sitting;ankle pumps/circles;hip flexion;LAQ  -LS    Recorded by   [LS] Shae Robledo, PT    Positioning and Restraints    Pre-Treatment Position (P)  in bed  -KR in bed  -CL in bed  -LS    Post Treatment Position (P)  chair  -KR chair  -CL chair  -LS    In Chair (P)  reclined;call light within reach;encouraged to call for assist;exit alarm on  -KR notified nsg;reclined;call light within reach;encouraged to call for assist;exit alarm on;legs elevated  -CL notified nsg;reclined;call light within reach;encouraged to call for assist;exit alarm on;legs elevated  -LS    Recorded by [KR] Vijaya Shen, PT Student [CL] Basilia Gautam, OT [LS] Shae Robledo PT      User Key  (r) = Recorded By, (t) = Taken By, (c) = Cosigned By    Initials Name Effective Dates    LS Shae Robledo, PT 06/19/15 -     CL Basilia Gautam, NANCI 06/08/16 -     KR Vijaya Shen, PT Student 05/30/17 -                 IP PT Goals       07/10/17 1152 07/09/17 1207 07/06/17 1410    Transfer Training PT LTG    Transfer Training PT LTG, Date Established   07/06/17  -SJ    Transfer Training PT LTG, Time to Achieve   2 wks  -SJ    Transfer Training PT LTG, Activity Type   bed to chair /chair to bed;sit to stand/stand to sit  -SJ    Transfer Training PT LTG, [place] Level   supervision required  -SJ    Transfer Training PT LTG, Assist Device   walker, rolling  -SJ    Transfer Training PT LTG, Outcome (P)  goal ongoing  -KR  goal ongoing  -SJ    Gait Training PT LTG    Gait Training Goal PT LTG, Date Established   07/06/17  -SJ    Gait Training Goal PT LTG, Time to Achieve   2 wks  -SJ    Gait Training Goal PT LTG, [place] Level   supervision required  -SJ    Gait Training Goal PT LTG, Assist Device   walker, rolling  -SJ    Gait Training Goal PT LTG, Distance to Achieve   200  -SJ    Gait Training Goal PT LTG, Outcome (P)  goal ongoing  -KR goal ongoing  -LS goal ongoing  -SJ    Dynamic Standing  Balance PT LTG    Dynamic Standing Balance PT LTG, Date Established   07/06/17  -SJ    Dynamic Standing Balance PT LTG, Time to Achieve   2 wks  -SJ    Dynamic Standing Balance PT LTG, [place] Level  conditional independence  -LS contact guard assist  -SJ    Dynamic Standing Balance PT LTG, Assist Device  assistive Device  -LS     Dynamic Standing Balance PT LTG, Date Goal Reviewed  07/09/17   previous goal achieved 7/9  -LS     Dynamic Standing Balance PT LTG, Outcome (P)  goal ongoing  -KR goal revised  -LS goal ongoing  -SJ      User Key  (r) = Recorded By, (t) = Taken By, (c) = Cosigned By    Initials Name Provider Type    SJ Sakshi Gutierrez, PT Physical Therapist    LS Shae Robledo, PT Physical Therapist    MADI Shen, PT Student PT Student          Physical Therapy Education     Title: PT OT SLP Therapies (Active)     Topic: Physical Therapy (Active)     Point: Mobility training (Active)    Learning Progress Summary    Learner Readiness Method Response Comment Documented by Status   Patient Acceptance E NR  KR 07/10/17 1152 Active    Acceptance E,D NR   07/09/17 1207 Active    Acceptance E NR   07/06/17 1412 Active               Point: Home exercise program (Active)    Learning Progress Summary    Learner Readiness Method Response Comment Documented by Status   Patient Acceptance E,D NR   07/09/17 1207 Active               Point: Body mechanics (Active)    Learning Progress Summary    Learner Readiness Method Response Comment Documented by Status   Patient Acceptance E NR  KR 07/10/17 1152 Active    Acceptance E,D NR   07/09/17 1207 Active    Acceptance E NR   07/06/17 1412 Active               Point: Precautions (Active)    Learning Progress Summary    Learner Readiness Method Response Comment Documented by Status   Patient Acceptance E NR  KR 07/10/17 1152 Active    Acceptance E,D NR  LS 07/09/17 1207 Active    Acceptance E NR   07/06/17 1412 Active                      User Key      Initials Effective Dates Name Provider Type Discipline    SJ 06/19/15 -  Sakshi Gutierrez, PT Physical Therapist PT    LS 06/19/15 -  Shae Robledo, PT Physical Therapist PT    KR 05/30/17 -  Vijaya Shen, PT Student PT Student PT                    PT Recommendation and Plan  Anticipated Discharge Disposition: inpatient rehabilitation facility  Planned Therapy Interventions: balance training, bed mobility training, gait training, home exercise program, patient/family education, strengthening, transfer training  PT Frequency: daily  Plan of Care Review  Plan Of Care Reviewed With: (P) patient  Progress: (P) improving  Outcome Summary/Follow up Plan: (P) Pt increased ambulation distance to 150ft with RW and CGA with 2 standing rest breaks d/t fatigue. VC's for upright posture and increased step length. Continue to progress as appropriate.          Outcome Measures       07/10/17 1055 07/09/17 1129 07/09/17 1128    How much help from another person do you currently need...    Turning from your back to your side while in flat bed without using bedrails? (P)  4  -KR  4  -LS    Moving from lying on back to sitting on the side of a flat bed without bedrails? (P)  3  -KR  3  -LS    Moving to and from a bed to a chair (including a wheelchair)? (P)  3  -KR  3  -LS    Standing up from a chair using your arms (e.g., wheelchair, bedside chair)? (P)  3  -KR  3  -LS    Climbing 3-5 steps with a railing? (P)  2  -KR  2  -LS    To walk in hospital room? (P)  3  -KR  3  -LS    AM-PAC 6 Clicks Score (P)  18  -KR  18  -LS    How much help from another is currently needed...    Putting on and taking off regular lower body clothing?  1  -CL     Bathing (including washing, rinsing, and drying)  2  -CL     Toileting (which includes using toilet bed pan or urinal)  2  -CL     Putting on and taking off regular upper body clothing  2  -CL     Taking care of personal grooming (such as brushing teeth)  4  -CL     Eating meals  4  -CL      Score  15  -CL     Functional Assessment    Outcome Measure Options (P)  AM-PAC 6 Clicks Basic Mobility (PT)  -KR AM-PAC 6 Clicks Daily Activity (OT)  -CL AM-PAC 6 Clicks Basic Mobility (PT)  -LS      User Key  (r) = Recorded By, (t) = Taken By, (c) = Cosigned By    Initials Name Provider Type    LS Shae Robledo, PT Physical Therapist    CL Basilia Gautam, OT Occupational Therapist    MADI Shen, PT Student PT Student           Time Calculation:         PT Charges       07/10/17 1154          Time Calculation    Start Time (P)  1055  -KR      PT Received On (P)  07/10/17  -KR      PT Goal Re-Cert Due Date (P)  07/16/17  -KR      Time Calculation- PT    Total Timed Code Minutes- PT (P)  27 minute(s)  -KR        User Key  (r) = Recorded By, (t) = Taken By, (c) = Cosigned By    Initials Name Provider Type    MADI Shen, PT Student PT Student          Therapy Charges for Today     Code Description Service Date Service Provider Modifiers Qty    [id number] HC PT THER PROC EA 15 MIN 7/10/2017 Vijaya Shen, PT Student GP 2          PT G-Codes  Outcome Measure Options: (P) AM-PAC 6 Clicks Basic Mobility (PT)    Melody Shen PT Student  7/10/2017

## 2017-07-10 NOTE — PROGRESS NOTES
Continued Stay Note  [facility]     Patient Name: Luis F Plummer  MRN: [id number]  Today's Date: 7/10/2017    Admit Date: 7/5/2017          Discharge Plan       07/10/17 1001    Case Management/Social Work Plan    Plan Evolving discharge plan    Patient/Family In Agreement With Plan yes    Additional Comments SW spoke with pt at bedside. Pt reports he is still thinking about rehab an dis unsure at this time because he would like to go home. Pt explained that he would like to see how he feels closer to discharge. Pt reports he has been able to take care of himself and everything before and if he feels better at discharge he would like to go home. SW also addressed some concerns brought up in a consult. Pt reports he has no concerns about his home. Pt's nurse had found a tick on him and pt reports he has ticks just like everyone does in the summer but they do not get in his home. Pt reports he has no cocnerns about his home. Pt explained that the ticks are outside, he has an open grass area where there are often a lot of ticks but they do not get into his home. Pt also reports he has no concerns about his shoes at this time. Pt did report that sometimes he boucher snot get his prescriptions filled because they can be too expensive even though he does have prescription coverage. SW/FERMÍN will continue to follow for discharge needs and concerns.    Final Note    Final Note CM/SW is following              Discharge Codes     None        Expected Discharge Date and Time     Expected Discharge Date Expected Discharge Time    Jul 10, 2017             CHEIKH Diehl

## 2017-07-10 NOTE — PROGRESS NOTES
[facility] at [facility]  IP Progress Note      Chief Complaint/Reason for service:  Acute systolic heart failure with severe LV dysfunction    Subjective: Denies the patient how he was doing that he said not good.  Planes of chest pain yesterday which lasted about 4 hours.  He also had some chest pain today.  He denies shortness of breath but looks dyspneic and tachypnea.  Pre-bypass he never had chest pain    Past medical, surgical, social and family history reviewed in the patient's electronic medical record.         Vital Sign Min/Max for last 24 hours  Temp  Min: 97.5 °F (36.4 °C)  Max: 98.1 °F (36.7 °C)   BP  Min: 102/58  Max: 142/100   Pulse  Min: 71  Max: 82   Resp  Min: 16  Max: 16   SpO2  Min: 94 %  Max: 98 %   No Data Recorded      Intake/Output Summary (Last 24 hours) at 07/10/17 0838  Last data filed at 07/10/17 0250   Gross per 24 hour   Intake                0 ml   Output             1650 ml   Net            -1650 ml             Current Facility-Administered Medications:   •  acetaminophen (TYLENOL) tablet 650 mg, 650 mg, Oral, Q4H PRN, Gris Irizarry V APRN, 650 mg at 07/09/17 0340  •  aspirin EC tablet 81 mg, 81 mg, Oral, Daily, Maryan Alonso APRN, 81 mg at 07/10/17 0804  •  atorvastatin (LIPITOR) tablet 20 mg, 20 mg, Oral, Nightly, Maryan Alonso APRN, 20 mg at 07/09/17 2059  •  bumetanide (BUMEX) tablet 1 mg, 1 mg, Oral, BID, Reggie Infante MD, 1 mg at 07/10/17 0804  •  calcium carbonate (TUMS) chewable tablet 500 mg (200 mg elemental), 2 tablet, Oral, BID PRN, Gris Irizarry V APRN, 2 tablet at 07/08/17 0901  •  carvedilol (COREG) tablet 3.125 mg, 3.125 mg, Oral, Q12H, Maryan Alonso APRN, 3.125 mg at 07/10/17 0803  •  dextrose (D50W) solution 25 g, 25 g, Intravenous, Q15 Min PRN, Monet Kent MD  •  dextrose (GLUTOSE) oral gel 15 g, 15 g, Oral, Q15 Min PRN, Monet Kent MD  •  doxycycline (VIBRAMYCIN) 100 mg/100 mL 0.9% NS MBP, 100  mg, Intravenous, Q12H, Monet Kent MD, 100 mg at 07/10/17 0034  •  glucagon (GLUCAGEN) injection 1 mg, 1 mg, Subcutaneous, Q15 Min PRN, Monet Kent MD  •  heparin (porcine) 5000 UNIT/ML injection 5,000 Units, 5,000 Units, Subcutaneous, Q12H, Gris Irizarry V, APRN, 5,000 Units at 07/10/17 0803  •  insulin lispro (humaLOG) injection 0-7 Units, 0-7 Units, Subcutaneous, 4x Daily AC & at Bedtime, Monet Kent MD, 2 Units at 07/10/17 0803  •  levothyroxine (SYNTHROID, LEVOTHROID) tablet 50 mcg, 50 mcg, Oral, Q AM, Monet Kent MD, 50 mcg at 07/10/17 0525  •  losartan (COZAAR) tablet 50 mg, 50 mg, Oral, Daily, Grisnilam Irizarry V, APRN, 50 mg at 07/10/17 0804  •  nitroglycerin (NITROSTAT) SL tablet 0.4 mg, 0.4 mg, Sublingual, Q5 Min PRN, Gris Irizarry V, APRN, 0.4 mg at 07/09/17 2036  •  nystatin (MYCOSTATIN) powder, , Topical, Q12H, Monet Kent MD  •  pantoprazole (PROTONIX) EC tablet 40 mg, 40 mg, Oral, Q AM, Monet Kent MD, 40 mg at 07/10/17 0525  •  sodium chloride 0.9 % flush 1-10 mL, 1-10 mL, Intravenous, PRN, Gris Irizarry V, APRN    Physical Exam: General  thin white male lying in bed at a 60° angle dyspneic and tachypnea could nasal O2        HEENT: Mild JVP is present his tongue is midline       Respiratory:  Equal bilateral symmetrical expansion bilateral crackles decreased breath sounds left base with E to a changes consistent with effusion       Cardiovascular: Regular rate and rhythm without gallop he does have a grade 4 systolic ejection murmur and he has severe edema       GI: Bowel sounds       Lower Extremities: 3+ pitting edema with some peripheral bruising       Neuro: Cranial nerves II through XII are grossly normal he moves all 4 extremities but this patient examination       Skin:  2+ pitting edema he also has sacral edema the skin is cool       Psych: Oriented ×3 pleasant affect    Results Review: The patient has had a net output of 5.6  L since admission.  His blood pressures are good and his renal function is normal.  I reviewed his chest x-ray which showed cardiomegaly and left pleural effusion and this was the most recent chest x-ray    Radiology Results:  Imaging Results (last 72 hours)     Procedure Component Value Units Date/Time    MRI Foot Left With & Without Contrast [[id number]] Collected:  07/07/17 0954     Updated:  07/07/17 1519    Narrative:       EXAMINATION: MRI FOOT LEFT W WO CONTRAST-     INDICATION: R60.9-Edema, unspecified; E92-Sifydca effusion, not  elsewhere classified; J81.0-Acute pulmonary edema; I50.23-Acute on  chronic systolic (congestive) heart failure; E78.5-Hyperlipidemia,  unspecified; I50.23-Acute on chronic systolic (congestive) heart  failure; I25.118-Atherosclerotic heart disease of native coronary artery  with other forms of angina pectoris; Z74.09-Other reduced mobility;  diffuse pain and swelling of the left foot, no focal draining lesion,  evaluate for bone infection or osteomyelitis.     TECHNIQUE: MR datasets of the left foot were performed without and with  intravenous contrast.     COMPARISON: Unfortunately, there are no comparison conventional images  available.     FINDINGS:   1. The patient is not able to cooperate fully. There is motion  degradation and misregistration on many of the datasets.     Also, the positioning of the foot for imaging is less than optimal  because the patient's condition.     2. There is evidence of mild increased fluid and edema throughout the  tarsal tunnel without focal abscess. This appears to be a diffuse  cellulitis. A drainable fluid collection is not identified. There is  evidence of dorsal mid and forefoot subcutaneous edema without focal  mass. Mild subcutis edema surrounds the left ankle.     3. No pathologic T1 depressed signal is identified in the marrow of the  bone structures of the left foot to include hindfoot, midfoot and  forefoot structures.     4. Achilles  tendon is normal. Calcaneus and talus are unremarkable. The  midfoot structures are negative for pathologic signal alteration and  negative for pathologic enhancement. The forefoot structures are also  intact. Although there is mild diffuse enhancement along the cellulitis  of the tarsal tunnel and plantar soft tissues, there is no definite  evidence to suggest focal enhancing lesion.     5. Lastly, there is mild increased signal in the subungual area of the  left great toe without evidence of pathologic bone marrow signal  alteration.       Impression:       1. Markedly limited examination because of constant movement of the left  foot during data set acquisition and therefore there is marked motion  degradation. Also, the positioning of the left foot is suboptimal for  imaging using this modality secondary to the patient's inability to  fully cooperate.  2. Grossly, however, there are no definite MR findings to support active  bone edema or intrinsic marrow infection or osteomyelitis.  3. There is diffuse soft tissue swelling around the left ankle, left mid  foot and left forefoot structures and there is diffuse inflammatory  cellulitis along the plantar aspect of the left foot involving tarsal  tunnel and plantar muscular structures including the flexor tendons. A  focal abscess, drainable collection or focal enhancing lesion is not  identified in this region. The midfoot and hindfoot structures are  otherwise intact. The plantar fascia and Achilles tendon are  unremarkable. The dome of the talus is intact.  Sinus tarsi, calcaneus  and midfoot structures are also unremarkable otherwise. Therefore, there  appears to be a diffuse cellulitis with edema without evidence of  definite marrow infection or drainable focal abscess.      D:  07/07/2017  E:  07/07/2017     This report was finalized on 7/7/2017 3:17 PM by Dr. Beny Rodriguez MD.       XR Chest 1 View [[id number]]  (Abnormal) Collected:  07/09/17 2027      Updated:  07/09/17 2127    Narrative:       EXAM:    XR Chest, 1 View    CLINICAL HISTORY:    71 years old, male; Hyperlipidemia, unspecified; Atherosclerotic heart   disease of native coronary artery with other forms of angina pectoris; Acute on   chronic systolic (congestive) heart failure; Acute on chronic systolic   (congestive) heart failure; Acute pulmonary edema; Pleural effusion, not   elsewhere classified; Edema, unspecified; Other reduced mobility; Other reduced   mobility; Pain; Chest pain; Radiating; Prior surgery; Surgery date: 6+ months;   Surgery type: Cabg; Patient HX: Chf, rapid response, pt having chest pain   radiates to left side, cabg 2 years ago; Additional info: Rrt    TECHNIQUE:    Frontal view of the chest.    EXAM DATE/TIME:    7/9/2017 8:27 PM    COMPARISON:    CR - XR CHEST 1 VW 7/5/2017 9:26:15 PM    FINDINGS:    Lungs: Streaky left lower lung opacity may represent atelectasis and/or   airspace disease. Left pleural effusion suspected.    Pleural space:  Unremarkable.  No pneumothorax.    Heart: Cardiomegaly and mild vascular congestion.    Mediastinum:  Unremarkable.    Bones/joints:  Unremarkable.    Other findings: Median sternotomy.      Impression:       Cardiomegaly and mild vascular congestion, with suspicion for left pleural   effusion.  Streaky left lower lung atelectasis and/or airspace disease.  Median sternotomy    THIS DOCUMENT HAS BEEN ELECTRONICALLY SIGNED BY DRAKE HERNDON MD          EKG: Sinus rhythm    ECHO: Severe left ventricular dysfunction EF 20%    Tele:      Assessment/Plan: 1 severe systolic heart failure with left ventricular dysfunction but also noncompliance-'s adnexa diuretic response since she's been here and is placed on appropriate vasodilators and beta blockers and diuretics.Patient still looks dyspneic and tachypnea can still has severe edema as well as sacral edema .  We will stop his by mouth Bumex and put him on a Bumex drip  2 LV dysfunction with  severe MR-persistent heart failure as above we will start Bumex drip  3 patient will need a LifeVest prior to discharge pain is STEMI he understands and is willing to proceed      Jimmy Prescott MD  07/10/17  8:38 AM

## 2017-07-10 NOTE — PROGRESS NOTES
"CTSP for RRT    S:  Sudden onset squeezing CP in left chest, 8/10, improved slightly with NTGx3.  Has occasional pains like this at home, doesn't have any nitro, just waits for them to go away.  He thinks he had a stress test after his CABG 2yrs ago and was told \"the pains are not from yr heart\".  Mild GERD at times.  Sees Dr Nuno.    O: Temp:  [98.1 °F (36.7 °C)] 98.1 °F (36.7 °C)  Heart Rate:  [69-81] 81  Resp:  [16] 16  BP: (138-142)/() 142/100  Constitutional: mild distress, awake, alert, [place] but pleasant and conversant  Eyes: PERRLA,   Neck: supple,   Respiratory: Clear to auscultation bilaterally, nonlabored respirations   Cardiovascular: RRR, no murmurs, rubs, or gallops,  Could not reproduce pain with palpation  Gastrointestinal: soft; nontender epigastrium.    Data:  EKG reviewed.  TWI are unchanged from several days ago.  QTc slightly longer.   trop is pending    A:  Chest pain, possibly ischemic, in man 2 yrs out from CABG.  -- await trop  -- ordered followup trop in 3 hrs.  -- morphine 2mg    Discussed with House and patient's nurse.  Troponins to be called to hospitalist.  Pt on ASA and statin.  Await troponins and consider adding plavix and heparin gtt.   "

## 2017-07-10 NOTE — PLAN OF CARE
Problem: Patient Care Overview (Adult)  Goal: Plan of Care Review  Outcome: Ongoing (interventions implemented as appropriate)    07/10/17 1152   Coping/Psychosocial Response Interventions   Plan Of Care Reviewed With patient   Patient Care Overview   Progress improving   Outcome Evaluation   Outcome Summary/Follow up Plan Pt increased ambulation distance to 150ft with RW and CGA with 2 standing rest breaks d/t fatigue. VC's for upright posture and increased step length. Continue to progress as appropriate.       Goal: Adult Individualization and Mutuality  Outcome: Ongoing (interventions implemented as appropriate)  Goal: Discharge Needs Assessment  Outcome: Ongoing (interventions implemented as appropriate)    Problem: Fall Risk (Adult)  Goal: Identify Related Risk Factors and Signs and Symptoms  Outcome: Ongoing (interventions implemented as appropriate)  Goal: Absence of Falls  Outcome: Ongoing (interventions implemented as appropriate)    Problem: Skin Integrity Impairment, Risk/Actual (Adult)  Goal: Identify Related Risk Factors and Signs and Symptoms  Outcome: Ongoing (interventions implemented as appropriate)    Problem: Cardiac Output, Decreased (Adult)  Goal: Identify Related Risk Factors and Signs and Symptoms  Outcome: Ongoing (interventions implemented as appropriate)  Goal: Adequate Cardiac Output/Effective Tissue Perfusion  Outcome: Ongoing (interventions implemented as appropriate)

## 2017-07-10 NOTE — PROGRESS NOTES
[facility]  INPATIENT PROGRESS NOTE    Date of Admission: 7/5/2017  Length of Stay: 4  Primary Care Physician: No Known Provider    Subjective-- HPI/Events overnight/ROS/CC- Hospital follow visit.  Detailed ROS not detailed as performed below        No significant change, ongoing complaint of L chest pain and Dyspnea. No palpitation. No F/C.      Objective      Temp:  [97.5 °F (36.4 °C)-98.1 °F (36.7 °C)] 97.7 °F (36.5 °C)  Heart Rate:  [71-82] 75  Resp:  [16] 16  BP: (102-139)/(58-94) 116/79    Physical Exam:  Physical Exam    NAD  RRR  CTAB  Abd soft and NT  LE edema stable and unchanged- scattered erythematous lesion on legs are stable. Ulcerations on bilat great toe and 3rd digits are stable.  Scrotal edema improved  Nonfocal        Results Review:    I have reviewed the labs, radiology results and diagnostic studies.      Results from last 7 days  Lab Units 07/10/17  0708   WBC 10*3/mm3 7.15   HEMOGLOBIN g/dL 11.3*   PLATELETS 10*3/mm3 174       Results from last 7 days  Lab Units 07/10/17  0708   SODIUM mmol/L 137   POTASSIUM mmol/L 3.6   CO2 mmol/L 32.0*   CREATININE mg/dL 1.30   GLUCOSE mg/dL 150*       Culture Data:  Radiology Data:     I have reviewed the medications.        Assessment/Plan     Assessment/Problem List  72 yo M, non-smoker, with history CAD, HTN, HLD, and DM2, who is noncompliant with medical therapy, reportedly due to financial difficulties, came in with worsening LE swelling and pain, +constant CP/dyspnea. He also reported a recent fall and c/o back pain/R rib cage pain.    Principal Problem:    Acute on chronic systolic heart failure/ICM   - EF 20%   - needs Life vest at DC    Active Problems:    Peripheral edema    Coronary artery disease of native artery of native heart with stable angina pectoris    Essential hypertension    Hyperlipidemia LDL goal <70    DM (diabetes mellitus)    Bilateral leg ulcer    CKD (chronic kidney disease)    Tick  bite    Peripheral neuropathy    Cellulitis-legs/feet with chronic ulcer- no osteomyelitis on MRI    Noncompliance     Plan  - Change to PO diuretics by cardiology this weekend, but put back on Bumex gtt again now by card  - Add basal insulin for better glucose control  - Cont Doxy (14 day course)  - DC planning in progress: would benefit from rehab, but pt reluctant.  - Ongoing PT/OT until discharge  - CM and SW following for DC needs      Monet Kent MD   07/10/17   4:23 PM    Please note that portions of this note may have been completed with a voice recognition program. Efforts were made to edit the dictations, but occasionally words are mistranscribed.

## 2017-07-10 NOTE — PLAN OF CARE
Problem: Patient Care Overview (Adult)  Goal: Plan of Care Review  Outcome: Ongoing (interventions implemented as appropriate)    07/10/17 1152   Coping/Psychosocial Response Interventions   Plan Of Care Reviewed With patient   Patient Care Overview   Progress improving   Outcome Evaluation   Outcome Summary/Follow up Plan Pt increased ambulation distance to 150ft with RW and CGA with 2 standing rest breaks d/t fatigue. VC's for upright posture and increased step length. Continue to progress as appropriate.         Problem: Inpatient Physical Therapy  Goal: Transfer Training Goal 1 LTG- PT  Outcome: Ongoing (interventions implemented as appropriate)    07/06/17 1410 07/10/17 1152   Transfer Training PT LTG   Transfer Training PT LTG, Date Established 07/06/17 --    Transfer Training PT LTG, Time to Achieve 2 wks --    Transfer Training PT LTG, Activity Type bed to chair /chair to bed;sit to stand/stand to sit --    Transfer Training PT LTG, [place] Level supervision required --    Transfer Training PT LTG, Assist Device walker, rolling --    Transfer Training PT LTG, Outcome --  goal ongoing       Goal: Gait Training Goal LTG- PT  Outcome: Ongoing (interventions implemented as appropriate)    07/06/17 1410 07/10/17 1152   Gait Training PT LTG   Gait Training Goal PT LTG, Date Established 07/06/17 --    Gait Training Goal PT LTG, Time to Achieve 2 wks --    Gait Training Goal PT LTG, [place] Level supervision required --    Gait Training Goal PT LTG, Assist Device walker, rolling --    Gait Training Goal PT LTG, Distance to Achieve 200 --    Gait Training Goal PT LTG, Outcome --  goal ongoing       Goal: Dynamic Standing Balance Goal LTG- PT  Outcome: Ongoing (interventions implemented as appropriate)    07/06/17 1410 07/09/17 1207 07/10/17 1152   Dynamic Standing Balance PT LTG   Dynamic Standing Balance PT LTG, Date Established 07/06/17 --  --    Dynamic Standing Balance PT LTG, Time to Achieve 2 wks --   --    Dynamic Standing Balance PT LTG, [place] Level --  conditional independence --    Dynamic Standing Balance PT LTG, Assist Device --  assistive Device --    Dynamic Standing Balance PT LTG, Date Goal Reviewed --  07/09/17  (previous goal achieved 7/9) --    Dynamic Standing Balance PT LTG, Outcome --  --  goal ongoing

## 2017-07-10 NOTE — PLAN OF CARE
Problem: Patient Care Overview (Adult)  Goal: Plan of Care Review  Outcome: Ongoing (interventions implemented as appropriate)    07/10/17 0350   Coping/Psychosocial Response Interventions   Plan Of Care Reviewed With patient   Patient Care Overview   Progress declining   Outcome Evaluation   Outcome Summary/Follow up Plan Pt with significant chest pain at beginning of shift; RRT called. Pt with 3x SL nitro and 2mg IV morphine before pain subsided. Troponins slightly elevated.         Problem: Fall Risk (Adult)  Goal: Identify Related Risk Factors and Signs and Symptoms  Outcome: Ongoing (interventions implemented as appropriate)  Goal: Absence of Falls  Outcome: Ongoing (interventions implemented as appropriate)    Problem: Skin Integrity Impairment, Risk/Actual (Adult)  Goal: Identify Related Risk Factors and Signs and Symptoms  Outcome: Ongoing (interventions implemented as appropriate)    Problem: Cardiac Output, Decreased (Adult)  Goal: Identify Related Risk Factors and Signs and Symptoms  Outcome: Ongoing (interventions implemented as appropriate)  Goal: Adequate Cardiac Output/Effective Tissue Perfusion  Outcome: Ongoing (interventions implemented as appropriate)

## 2017-07-11 LAB
ANION GAP SERPL CALCULATED.3IONS-SCNC: 10 MMOL/L (ref 3–11)
BNP SERPL-MCNC: 1413 PG/ML (ref 0–100)
BUN BLD-MCNC: 25 MG/DL (ref 9–23)
BUN/CREAT SERPL: 20.8 (ref 7–25)
CALCIUM SPEC-SCNC: 9.6 MG/DL (ref 8.7–10.4)
CHLORIDE SERPL-SCNC: 92 MMOL/L (ref 99–109)
CO2 SERPL-SCNC: 37 MMOL/L (ref 20–31)
CREAT BLD-MCNC: 1.2 MG/DL (ref 0.6–1.3)
GFR SERPL CREATININE-BSD FRML MDRD: 60 ML/MIN/1.73
GLUCOSE BLD-MCNC: 114 MG/DL (ref 70–100)
GLUCOSE BLDC GLUCOMTR-MCNC: 116 MG/DL (ref 70–130)
GLUCOSE BLDC GLUCOMTR-MCNC: 125 MG/DL (ref 70–130)
GLUCOSE BLDC GLUCOMTR-MCNC: 153 MG/DL (ref 70–130)
GLUCOSE BLDC GLUCOMTR-MCNC: 176 MG/DL (ref 70–130)
POTASSIUM BLD-SCNC: 3.1 MMOL/L (ref 3.5–5.5)
SODIUM BLD-SCNC: 139 MMOL/L (ref 132–146)

## 2017-07-11 PROCEDURE — 99232 SBSQ HOSP IP/OBS MODERATE 35: CPT | Performed by: HOSPITALIST

## 2017-07-11 PROCEDURE — 97530 THERAPEUTIC ACTIVITIES: CPT

## 2017-07-11 PROCEDURE — 25010000002 HEPARIN (PORCINE) PER 1000 UNITS: Performed by: NURSE PRACTITIONER

## 2017-07-11 PROCEDURE — 99232 SBSQ HOSP IP/OBS MODERATE 35: CPT | Performed by: INTERNAL MEDICINE

## 2017-07-11 PROCEDURE — 83880 ASSAY OF NATRIURETIC PEPTIDE: CPT | Performed by: INTERNAL MEDICINE

## 2017-07-11 PROCEDURE — 80048 BASIC METABOLIC PNL TOTAL CA: CPT | Performed by: INTERNAL MEDICINE

## 2017-07-11 PROCEDURE — 97116 GAIT TRAINING THERAPY: CPT

## 2017-07-11 PROCEDURE — 82962 GLUCOSE BLOOD TEST: CPT

## 2017-07-11 PROCEDURE — 97110 THERAPEUTIC EXERCISES: CPT

## 2017-07-11 PROCEDURE — 63710000001 INSULIN DETEMIR PER 5 UNITS: Performed by: HOSPITALIST

## 2017-07-11 RX ORDER — ATORVASTATIN CALCIUM 20 MG/1
20 TABLET, FILM COATED ORAL NIGHTLY
COMMUNITY

## 2017-07-11 RX ORDER — BUMETANIDE 1 MG/1
1 TABLET ORAL DAILY
COMMUNITY
Start: 2016-04-16 | End: 2017-07-21 | Stop reason: HOSPADM

## 2017-07-11 RX ORDER — CARVEDILOL 12.5 MG/1
12.5 TABLET ORAL 2 TIMES DAILY
Status: ON HOLD | COMMUNITY
End: 2017-07-21

## 2017-07-11 RX ORDER — LEVOTHYROXINE SODIUM 75 UG/1
75 CAPSULE ORAL DAILY
COMMUNITY
End: 2017-07-21 | Stop reason: HOSPADM

## 2017-07-11 RX ORDER — SILDENAFIL CITRATE 20 MG/1
20 TABLET ORAL 3 TIMES DAILY
COMMUNITY
Start: 2016-04-16 | End: 2017-07-21 | Stop reason: HOSPADM

## 2017-07-11 RX ORDER — POTASSIUM CHLORIDE 750 MG/1
20 TABLET, FILM COATED, EXTENDED RELEASE ORAL 2 TIMES DAILY
COMMUNITY
Start: 2016-04-16 | End: 2017-07-21 | Stop reason: HOSPADM

## 2017-07-11 RX ORDER — LANOLIN ALCOHOL/MO/W.PET/CERES
3 CREAM (GRAM) TOPICAL NIGHTLY PRN
COMMUNITY

## 2017-07-11 RX ADMIN — BUMETANIDE 1 MG/HR: 0.25 INJECTION INTRAMUSCULAR; INTRAVENOUS at 11:07

## 2017-07-11 RX ADMIN — LEVOTHYROXINE SODIUM 50 MCG: 50 TABLET ORAL at 05:05

## 2017-07-11 RX ADMIN — INSULIN DETEMIR 7 UNITS: 100 INJECTION, SOLUTION SUBCUTANEOUS at 21:32

## 2017-07-11 RX ADMIN — ACETAMINOPHEN 650 MG: 325 TABLET, FILM COATED ORAL at 21:30

## 2017-07-11 RX ADMIN — NYSTATIN: 100000 POWDER TOPICAL at 10:46

## 2017-07-11 RX ADMIN — ASPIRIN 81 MG: 81 TABLET, COATED ORAL at 10:43

## 2017-07-11 RX ADMIN — INSULIN LISPRO 2 UNITS: 100 INJECTION, SOLUTION INTRAVENOUS; SUBCUTANEOUS at 17:48

## 2017-07-11 RX ADMIN — CARVEDILOL 3.12 MG: 3.12 TABLET, FILM COATED ORAL at 21:24

## 2017-07-11 RX ADMIN — INSULIN DETEMIR 7 UNITS: 100 INJECTION, SOLUTION SUBCUTANEOUS at 11:05

## 2017-07-11 RX ADMIN — LOSARTAN POTASSIUM 50 MG: 50 TABLET, FILM COATED ORAL at 10:42

## 2017-07-11 RX ADMIN — PANTOPRAZOLE SODIUM 40 MG: 40 TABLET, DELAYED RELEASE ORAL at 05:05

## 2017-07-11 RX ADMIN — ATORVASTATIN CALCIUM 20 MG: 20 TABLET, FILM COATED ORAL at 21:24

## 2017-07-11 RX ADMIN — HEPARIN SODIUM 5000 UNITS: 5000 INJECTION, SOLUTION INTRAVENOUS; SUBCUTANEOUS at 10:42

## 2017-07-11 RX ADMIN — NYSTATIN: 100000 POWDER TOPICAL at 21:26

## 2017-07-11 RX ADMIN — CARVEDILOL 3.12 MG: 3.12 TABLET, FILM COATED ORAL at 10:42

## 2017-07-11 RX ADMIN — DOXYCYCLINE HYCLATE 100 MG: 100 CAPSULE ORAL at 21:26

## 2017-07-11 RX ADMIN — BUMETANIDE 1 MG/HR: 0.25 INJECTION INTRAMUSCULAR; INTRAVENOUS at 21:30

## 2017-07-11 RX ADMIN — INSULIN LISPRO 2 UNITS: 100 INJECTION, SOLUTION INTRAVENOUS; SUBCUTANEOUS at 13:27

## 2017-07-11 RX ADMIN — DOXYCYCLINE HYCLATE 100 MG: 100 CAPSULE ORAL at 10:44

## 2017-07-11 RX ADMIN — HEPARIN SODIUM 5000 UNITS: 5000 INJECTION, SOLUTION INTRAVENOUS; SUBCUTANEOUS at 21:24

## 2017-07-11 NOTE — PLAN OF CARE
"Problem: Patient Care Overview (Adult)  Goal: Plan of Care Review  Outcome: Ongoing (interventions implemented as appropriate)  Edema of legs \"getting better\", pt incon urine at times, asks for snacks freq.    07/11/17 1431   Coping/Psychosocial Response Interventions   Plan Of Care Reviewed With patient   Patient Care Overview   Progress improving       Goal: Adult Individualization and Mutuality  Outcome: Ongoing (interventions implemented as appropriate)  Goal: Discharge Needs Assessment  Outcome: Ongoing (interventions implemented as appropriate)    Problem: Fall Risk (Adult)  Goal: Identify Related Risk Factors and Signs and Symptoms  Outcome: Ongoing (interventions implemented as appropriate)  Goal: Absence of Falls  Outcome: Ongoing (interventions implemented as appropriate)    Problem: Skin Integrity Impairment, Risk/Actual (Adult)  Goal: Identify Related Risk Factors and Signs and Symptoms  Outcome: Ongoing (interventions implemented as appropriate)    Problem: Cardiac Output, Decreased (Adult)  Goal: Identify Related Risk Factors and Signs and Symptoms  Outcome: Ongoing (interventions implemented as appropriate)  Goal: Adequate Cardiac Output/Effective Tissue Perfusion  Outcome: Ongoing (interventions implemented as appropriate)      "

## 2017-07-11 NOTE — THERAPY TREATMENT NOTE
Acute Care - Physical Therapy Treatment Note  [facility]     Patient Name: Luis F Plummer  : 1945  MRN: [id number]  Today's Date: 2017  Onset of Illness/Injury or Date of Surgery Date: 17  Date of Referral to PT: 17  Referring Physician: FRANCO Irizarry    Admit Date: 2017    Visit Dx:    ICD-10-CM ICD-9-CM   1. Peripheral edema R60.9 782.3   2. Pleural effusion J90 511.9   3. Acute pulmonary edema J81.0 518.4   4. Acute on chronic systolic congestive heart failure I50.23 428.23     428.0   5. Hyperlipidemia LDL goal <70 E78.5 272.4   6. Acute on chronic systolic heart failure I50.23 428.23   7. Coronary artery disease of native artery of native heart with stable angina pectoris I25.118 414.01     413.9   8. Impaired mobility and ADLs Z74.09 799.89   9. Impaired functional mobility, balance, gait, and endurance Z74.09 V49.89     Patient Active Problem List   Diagnosis   • Peripheral edema   • Acute on chronic systolic heart failure   • Coronary artery disease of native artery of native heart with stable angina pectoris   • Essential hypertension   • Hyperlipidemia LDL goal <70   • DM (diabetes mellitus)   • Bilateral leg ulcer   • CKD (chronic kidney disease)   • Tick bite   • Peripheral neuropathy   • Cellulitis-legs/feet with chronic ulcer- no osteomyelitis on MRI   • Ischemic cardiomyopathy   • Noncompliance               Adult Rehabilitation Note       17 1304 07/10/17 1055 17 1129    Rehab Assessment/Intervention    Discipline physical therapist  -EH physical therapist  -MADI ACOSTA JB2 occupational therapist  -CL    Document Type therapy note (daily note)  - therapy note (daily note)  -MADI ACOSTA JB2 therapy note (daily note)  -CL    Subjective Information agree to therapy;no complaints  - agree to therapy;no complaints  -MADI ACOSTA JB2 agree to therapy;complains of;pain  -CL    Patient Effort, Rehab Treatment good  - good  -MADI ACOSTA JB2 good  -CL    Symptoms Noted During/After  Treatment none  -EH fatigue  -DAVE,KR,JB2 fatigue  -CL    Precautions/Limitations fall precautions  -EH fall precautions  -DAVE,KR,JB2 fall precautions;other (see comments)   Exit alarm  -CL    Specific Treatment Considerations   Pt [facility].   -CL    Recorded by [EH] Shauna Todd, PT [DAVE,KR,JB2] Abbi Mason, PT (r) Vijaya Shen, PT Student (t) Abbi Mason, PT (c) [CL] Basilia Gautam, OT    Vital Signs    Pre Systolic BP Rehab  119  -DAVE,KR,JB2 142  -CL    Pre Treatment Diastolic BP  80  -DAVE,KR,JB2 100   RN aware, cleared for therapy.   -CL    Post Systolic BP Rehab  115  -DAVE,KR,JB2     Post Treatment Diastolic BP  74  -DAVE,KR,JB2     Pretreatment Heart Rate (beats/min)  73  -DAVE,KR,JB2     Posttreatment Heart Rate (beats/min)  73  -DAVE,KR,JB2     Pre SpO2 (%)  99  -DAVE,KR,JB2     O2 Delivery Pre Treatment  supplemental O2  -DAVE,KR,JB2     Post SpO2 (%)  99  -DAVE,KR,JB2     O2 Delivery Post Treatment  supplemental O2  -DAVE,KR,JB2     Pre Patient Position  Supine  -DAVE,KR,JB2     Intra Patient Position  Standing  -DAVE,KR,JB2     Post Patient Position  Sitting  -DAVE,KR,JB2     Recorded by  [DAVE,KR,JB2] Abbi Mason, PT (r) Vijaya Shen, PT Student (t) Abbi Mason, PT (c) [CL] Basilia Gautam, OT    Pain Assessment    Pain Assessment Ansari-Simpson FACES  - 0-10  -DAVE,KR,JB2 0-10  -CL    Ansari-Simpson FACES Pain Rating 4  -      Pain Score  0  -DAVE,KR,JB2 8  -CL    Post Pain Score  0  -DAVE,KR,JB2 8  -CL    Pain Location   Leg  -CL    Pain Orientation   Right;Left  -CL    Pain Intervention(s) Repositioned;Ambulation/increased activity  -EH  Repositioned;Ambulation/increased activity  -CL    Response to Interventions appears to have improved.  -EH  Tolerated, RN notified.   -CL    Recorded by [EH] Shauna Todd, PT [DAVE,KR,JB2] Abbi Mason, PT (r) Vijaya Shen, PT Student (t) Abbi Mason, PT (c) [CL] Basilia Gautam, OT    Cognitive Assessment/Intervention    Current Cognitive/Communication Assessment  functional  -EH functional  -DAVE,KR,JB2 functional  -CL    Orientation Status oriented x 4  - oriented to;person;place  -DAVE,KR,JB2 oriented to;person;place;required verbal cueing (specifiy in comments);time  -CL    Follows Commands/Answers Questions 100% of the time;able to follow single-step instructions;needs cueing;needs increased time;needs repetition  - able to follow single-step instructions;100% of the time;needs cueing;needs increased time;needs repetition  -DAVE,MADI,JB2 100% of the time;needs increased time;needs repetition;needs cueing  -CL    Personal Safety decreased awareness, need for assist;decreased awareness, need for safety;mild impairment  - mild impairment;decreased awareness, need for assist;decreased awareness, need for safety;decreased insight to deficits  -DAVE,MADI,JB2 mild impairment;decreased awareness, need for assist;decreased awareness, need for safety  -CL    Personal Safety Interventions fall prevention program maintained;gait belt;nonskid shoes/slippers when out of bed  - fall prevention program maintained;gait belt;nonskid shoes/slippers when out of bed  -DAVE,MADI,JB2 fall prevention program maintained;gait belt;nonskid shoes/slippers when out of bed  -CL    Recorded by [] Shauna Todd, PT [DAVE,KR,JB2] Abbi Mason PT (r) Vijaya Shen, PT Student (t) Abbi Mason, PT (c) [CL] Basilia Gautam OT    Bed Mobility, Assessment/Treatment    Bed Mobility, Assistive Device   head of bed elevated  -CL    Bed Mob, Supine to Sit, [place] contact guard assist  - supervision required  -DAVE,MADI,JB2 minimum assist (75% patient effort);verbal cues required  -CL    Bed Mobility, Comment Increased time needed; VC for squencing.  - VC's for sequencing  -MADI ACOSTA,JB2 VCs for HP/sequencing.   -CL    Recorded by [] Shauna Todd, PT [DAVE,KR,JB2] Abbi Mason PT (r) Vijaya Shen, PT Student (t) Abbi Mason, PT (c) [CL] Basilia Gautam, NANCI    Transfer Assessment/Treatment     Transfers, Bed-Chair [place] minimum assist (75% patient effort);2 person assist required  -EH      Transfers, Bed-Chair-Bed, Assist Device rolling walker  -EH      Transfers, Sit-Stand [place] minimum assist (75% patient effort);2 person assist required   from bed; CGA x 2 from chair  -EH minimum assist (75% patient effort);verbal cues required  -DAVE,KR,JB2 minimum assist (75% patient effort);verbal cues required  -CL    Transfers, Stand-Sit [place] contact guard assist;2 person assist required  -EH minimum assist (75% patient effort);verbal cues required  -DAVE,KR,JB2 minimum assist (75% patient effort);verbal cues required  -CL    Transfers, Sit-Stand-Sit, Assist Device rolling walker  -EH  rolling walker  -CL    Toilet Transfer, [place]   contact guard assist;verbal cues required  -CL    Toilet Transfer, Assistive Device   rolling walker;bedside commode without drop arms  -CL    Transfer, Impairments strength decreased;coordination impaired;impaired balance  -EH strength decreased;impaired balance  -DAVE,KR,JB2     Transfer, Comment VC given for hand placement, movement of walker, sequencing of turn. Assist needed for control of RWx to turn  - Pt given VC's for hand placement.   -DAVE,KR,JB2 VCs for HP.   -CL    Recorded by [EH] Shauna Todd, PT [DAVE,KR,JB2] Abbi Mason, PT (r) Vijaya Shen PT Student (t) Abbi Mason, PT (c) [CL] Basilia Gautam OT    Gait Assessment/Treatment    Gait, [place] Level contact guard assist;verbal cues required  - contact guard assist;verbal cues required  -DAVE,KR,JB2     Gait, Assistive Device rolling walker  -EH rolling walker  -DAVE,KR,JB2     Gait, Distance (Feet) 5  -  -DAVE,KR,JB2     Gait, Gait Pattern Analysis swing-to gait  -EH swing-to gait  -DAVE,KR,JB2     Gait, Gait Deviations decreased heel strike;step length decreased  - anton decreased;forward flexed posture;step length decreased;weight-shifting ability decreased   -DAVE,MADI,JB2     Gait, Safety Issues step length decreased;balance decreased during turns;weight-shifting ability decreased  - step length decreased;balance decreased during turns;supplemental O2  -MADI ACOSTA,JB2     Gait, Impairments coordination impaired  -EH strength decreased;impaired balance  -MADI ACOSTA,JB2     Gait, Comment Additional 2 minutes high steps in place and standing ther ex.   -EH VC's for upright posture. Pt's gait slow and labored. 2 standing rest breaks.  -DAVE,MADI,DAVE2     Recorded by [EH] Shauna Todd, PT [DAVE,MADI,JB2] Abbi Mason, PT (r) Vijaya Shen, PT Student (t) Abbi Mason, PT (c)     Functional Mobility    Functional Mobility- Ind. Level   contact guard assist;verbal cues required  -CL    Functional Mobility- Device   rolling walker  -CL    Functional Mobility-Distance (Feet)   70  -CL    Functional Mobility- Comment   VCs for posture.   -CL    Recorded by   [CL] Basilia Gautam OT    Toileting Assessment/Training    Toileting Assess/Train, Position   standing  -CL    Toileting Assess/Train, Indepen Level   dependent (less than 25% patient effort)  -CL    Toileting Assess/Train, Comment   Clothing management and post toilet hygiene.   -CL    Recorded by   [CL] Basilia Gautam OT    Motor Skills/Interventions    Additional Documentation  Balance Skills Training (Group)  -MADI ACOSTA JB2 Balance Skills Training (Group)  -CL    Recorded by  [DAVE,MADI,JB2] Abbi Mason, PT (r) Vijaya Shen, PT Student (t) Abbi Mason, PT (c) [CL] Basilia Gautam OT    Balance Skills Training    Sitting-Level of Assistance  Distant supervision  -MADI ACOSTA,JB2 Distant supervision  -CL    Sitting-Balance Support  Right upper extremity supported;Left upper extremity supported;Feet supported  -DAVE,MADI,JB2 Right upper extremity supported;Left upper extremity supported;Feet supported  -CL    Sitting-Balance Activities   Forward lean;Reaching for objects;Trunk control activities  -CL    Standing-Level of Assistance   Contact guard  -DAVE,MADI,JB2 Contact guard  -CL    Static Standing Balance Support  assistive device  -DAVE,KR,JB2 assistive device  -CL    Standing-Balance Activities   Weight Shift A-P;Weight Shift R-L  -CL    Gait Balance-Level of Assistance   Contact guard  -CL    Gait Balance Support   assistive device  -CL    Recorded by  [DAVE,KR,JB2] Abbi Mason, PT (r) Vijaya Shen, PT Student (t) Abbi Mason, PT (c) [CL] Basilia Gautam, NANCI    Therapy Exercises    Bilateral Lower Extremities AROM:;standing;hip flexion;hip extension;hip abduction/adduction  -EH      Recorded by [EH] Shauna Todd, PT      Positioning and Restraints    Pre-Treatment Position in bed  -EH in bed  -MADI ACOSTA JB2 in bed  -CL    Post Treatment Position chair  -EH chair  -DAVE,MADI,JB2 chair  -CL    In Chair reclined;notified nsg;call light within reach;encouraged to call for assist;exit alarm on  -EH reclined;call light within reach;encouraged to call for assist;exit alarm on  -DAVE,MADI,JB2 notified nsg;reclined;call light within reach;encouraged to call for assist;exit alarm on;legs elevated  -CL    Recorded by [EH] Shauna Todd, PT [DAVE,KR,JB2] Abbi Mason, PT (r) Vijaya Shen, PT Student (t) Abib Mason, PT (c) [CL] Basilia Gautam, NANCI      07/09/17 1128          Rehab Assessment/Intervention    Discipline physical therapist  -LS      Document Type therapy note (daily note)  -LS      Subjective Information agree to therapy;complains of;pain  -LS      Patient Effort, Rehab Treatment good  -LS      Symptoms Noted During/After Treatment fatigue  -LS      Precautions/Limitations fall precautions  -LS      Specific Treatment Considerations Pt [place].  -LS      Recorded by [LS] Shae Robledo, PT      Vital Signs    Pre Systolic BP Rehab 142   RN aware; gave consent for therapy  -LS      Pre Treatment Diastolic   -LS      Pretreatment Heart Rate (beats/min) 76  -LS      O2 Delivery Pre Treatment room air  -LS      O2 Delivery Post  Treatment room air  -LS      Pre Patient Position Supine  -LS      Intra Patient Position Standing  -LS      Post Patient Position Sitting  -LS      Recorded by [LS] Shae Robledo, PT      Pain Assessment    Pain Assessment 0-10  -LS      Pain Score 8  -LS      Post Pain Score 8  -LS      Pain Location Leg  -LS      Pain Orientation Right;Left  -LS      Pain Intervention(s) Repositioned;Ambulation/increased activity  -LS      Response to Interventions tolerated; notified RN  -LS      Recorded by [LS] Shae Robledo, PT      Cognitive Assessment/Intervention    Current Cognitive/Communication Assessment functional  -LS      Orientation Status oriented to;person;place;situation   cues for time of day  -LS      Follows Commands/Answers Questions able to follow single-step instructions;100% of the time;needs cueing  -LS      Personal Safety mild impairment;decreased awareness, need for assist;decreased awareness, need for safety;decreased insight to deficits  -LS      Personal Safety Interventions fall prevention program maintained;gait belt;nonskid shoes/slippers when out of bed  -LS      Recorded by [LS] Shae Robledo, PT      Bed Mobility, Assessment/Treatment    Bed Mobility, Assistive Device head of bed elevated  -LS      Bed Mob, Supine to Sit, [place] minimum assist (75% patient effort);verbal cues required  -LS      Bed Mob, Sit to Supine, [place] not tested  -LS      Recorded by [LS] Shae Robledo, PT      Transfer Assessment/Treatment    Transfers, Sit-Stand [place] minimum assist (75% patient effort);verbal cues required  -LS      Transfers, Stand-Sit [place] minimum assist (75% patient effort);verbal cues required  -LS      Transfers, Sit-Stand-Sit, Assist Device rolling walker  -LS      Toilet Transfer, [place] contact guard assist  -LS      Toilet Transfer, Assistive Device rolling walker  -LS      Transfer, Impairments strength decreased;impaired balance  -LS      Transfer, Comment  Min A for initial stand from EOB; pt progressed to CGA with stand from [facility] [place], [place]. VC's for hand placement.   -LS      Recorded by [LS] Shae Robledo, PT      Gait Assessment/Treatment    Gait, [place] Level contact guard assist;verbal cues required  -LS      Gait, Assistive Device rolling walker  -LS      Gait, Distance (Feet) 70  -LS      Gait, Gait Deviations anton decreased;forward flexed posture;step length decreased  -LS      Gait, Impairments pain;strength decreased  -LS      Gait, Comment VC's for posture and increasing step length within RWx; distance limited by fatigue.   -LS      Recorded by [LS] Shae Robledo, PT      Motor Skills/Interventions    Additional Documentation Balance Skills Training (Group)  -LS      Recorded by [LS] Shae Robledo, PT      Balance Skills Training    Sitting-Level of Assistance Independent  -LS      Sitting-Balance Support Feet supported  -LS      Standing-Level of Assistance Contact guard  -LS      Static Standing Balance Support assistive device  -LS      Standing-Balance Activities Weight Shift A-P  -LS      Standing Balance # of Minutes 2   standing at [facility] [place], [place] for hygiene  -LS      Gait Balance-Level of Assistance Contact guard  -LS      Gait Balance Support assistive device  -LS      Recorded by [LS] Shae Robledo, PT      Therapy Exercises    Bilateral Lower Extremities AROM:;10 reps;sitting;ankle pumps/circles;hip flexion;LAQ  -LS      Recorded by [LS] Shae Robledo, PT      Positioning and Restraints    Pre-Treatment Position in bed  -LS      Post Treatment Position chair  -LS      In Chair notified nsg;reclined;call light within reach;encouraged to call for assist;exit alarm on;legs elevated  -LS      Recorded by [LS] Shae Robledo, PT        User Key  (r) = Recorded By, (t) = Taken By, (c) = Cosigned By    Initials Name Effective Dates    DAVE Abbi Mason, PT 06/19/15 -     EH Shauna Todd, PT 06/19/15 -     LS Shae Robledo, PT 06/19/15 -     CL Basilia Gautam,  OT 06/08/16 -     MADI Shen, PT Student 05/30/17 -                 IP PT Goals       07/11/17 1420 07/10/17 1152 07/09/17 1207    Transfer Training PT LTG    Transfer Training PT LTG, Outcome goal ongoing  -EH goal ongoing  -DAVE (r) KR (t) DAVE (c)     Gait Training PT LTG    Gait Training Goal PT LTG, Outcome goal ongoing  -EH goal ongoing  -DAVE (r) KR (t) DAVE (c) goal ongoing  -LS    Dynamic Standing Balance PT LTG    Dynamic Standing Balance PT LTG, [place] Level   conditional independence  -LS    Dynamic Standing Balance PT LTG, Assist Device   assistive Device  -LS    Dynamic Standing Balance PT LTG, Date Goal Reviewed   07/09/17   previous goal achieved 7/9  -LS    Dynamic Standing Balance PT LTG, Outcome goal ongoing  -EH goal ongoing  -DAVE (r) KR (t) DAVE (c) goal revised  -LS      07/06/17 1410          Transfer Training PT LTG    Transfer Training PT LTG, Date Established 07/06/17  -SJ      Transfer Training PT LTG, Time to Achieve 2 wks  -SJ      Transfer Training PT LTG, Activity Type bed to chair /chair to bed;sit to stand/stand to sit  -SJ      Transfer Training PT LTG, [place] Level supervision required  -SJ      Transfer Training PT LTG, Assist Device walker, rolling  -SJ      Transfer Training PT LTG, Outcome goal ongoing  -SJ      Gait Training PT LTG    Gait Training Goal PT LTG, Date Established 07/06/17  -SJ      Gait Training Goal PT LTG, Time to Achieve 2 wks  -SJ      Gait Training Goal PT LTG, [place] Level supervision required  -SJ      Gait Training Goal PT LTG, Assist Device walker, rolling  -SJ      Gait Training Goal PT LTG, Distance to Achieve 200  -SJ      Gait Training Goal PT LTG, Outcome goal ongoing  -SJ      Dynamic Standing Balance PT LTG    Dynamic Standing Balance PT LTG, Date Established 07/06/17  -SJ      Dynamic Standing Balance PT LTG, Time to Achieve 2 wks  -SJ      Dynamic Standing Balance PT LTG, [place] Level contact guard assist  -SJ      Dynamic  Standing Balance PT LTG, Outcome goal ongoing  -        User Key  (r) = Recorded By, (t) = Taken By, (c) = Cosigned By    Initials Name Provider Type    DAVE Mason, PT Physical Therapist     Shauna Todd, PT Physical Therapist     Sakshi Gutierrez, PT Physical Therapist    LS Shae Robledo, PT Physical Therapist    MADI Shen, PT Student PT Student          Physical Therapy Education     Title: PT OT SLP Therapies (Active)     Topic: Physical Therapy (Active)     Point: Mobility training (Active)    Learning Progress Summary    Learner Readiness Method Response Comment Documented by Status   Patient Acceptance E NR   07/11/17 1420 Active    Acceptance E NR  KR 07/10/17 1152 Active    Acceptance E,D NR   07/09/17 1207 Active    Acceptance E NR   07/06/17 1412 Active               Point: Home exercise program (Active)    Learning Progress Summary    Learner Readiness Method Response Comment Documented by Status   Patient Acceptance E NR   07/11/17 1420 Active    Acceptance E,D NR   07/09/17 1207 Active               Point: Body mechanics (Active)    Learning Progress Summary    Learner Readiness Method Response Comment Documented by Status   Patient Acceptance E NR   07/11/17 1420 Active    Acceptance E NR  KR 07/10/17 1152 Active    Acceptance E,D NR   07/09/17 1207 Active    Acceptance E NR   07/06/17 1412 Active               Point: Precautions (Active)    Learning Progress Summary    Learner Readiness Method Response Comment Documented by Status   Patient Acceptance E NR   07/11/17 1420 Active    Acceptance E NR  KR 07/10/17 1152 Active    Acceptance E,D NR   07/09/17 1207 Active    Acceptance E NR   07/06/17 1412 Active                      User Key     Initials Effective Dates Name Provider Type Discipline     06/19/15 -  Shauna Todd, PT Physical Therapist PT     06/19/15 -  Sakshi Gutierrez, PT Physical Therapist PT     06/19/15 -  Shae Robledo, PT  Physical Therapist PT    KR 05/30/17 -  Vijaya Shen, PT Student PT Student PT                    PT Recommendation and Plan  Anticipated Discharge Disposition: inpatient rehabilitation facility  Planned Therapy Interventions: balance training, bed mobility training, gait training, home exercise program, patient/family education, strengthening, transfer training  PT Frequency: daily  Plan of Care Review  Plan Of Care Reviewed With: patient  Outcome Summary/Follow up Plan: pt t/f to chair and performs 2 minutes steps in place and standing ther ex. Limited by infection percautions.          Outcome Measures       07/11/17 1304 07/10/17 1055 07/09/17 1129    How much help from another person do you currently need...    Turning from your back to your side while in flat bed without using bedrails? 4  -EH 4  -DAVE (r) KR (t) DAVE (c)     Moving from lying on back to sitting on the side of a flat bed without bedrails? 3  -EH 3  -DAVE (r) KR (t) DAVE (c)     Moving to and from a bed to a chair (including a wheelchair)? 3  -EH 3  -DAVE (r) KR (t) DAVE (c)     Standing up from a chair using your arms (e.g., wheelchair, bedside chair)? 3  -EH 3  -DAVE (r) KR (t) DAVE (c)     Climbing 3-5 steps with a railing? 2  -EH 2  -DAVE (r) KR (t) DAVE (c)     To walk in hospital room? 3  -EH 3  -DAVE (r) KR (t) DAVE (c)     AM-PAC 6 Clicks Score 18  -EH 18  -DAVE (r) KR (t)     How much help from another is currently needed...    Putting on and taking off regular lower body clothing?   1  -CL    Bathing (including washing, rinsing, and drying)   2  -CL    Toileting (which includes using toilet bed pan or urinal)   2  -CL    Putting on and taking off regular upper body clothing   2  -CL    Taking care of personal grooming (such as brushing teeth)   4  -CL    Eating meals   4  -CL    Score   15  -CL    Functional Assessment    Outcome Measure Options AM-PAC 6 Clicks Basic Mobility (PT)  -EH AM-PAC 6 Clicks Basic Mobility (PT)  -DAVE (r) KR (t) DAVE (c) AM-PAC 6  Clicks Daily Activity (OT)  -CL      07/09/17 1128          How much help from another person do you currently need...    Turning from your back to your side while in flat bed without using bedrails? 4  -LS      Moving from lying on back to sitting on the side of a flat bed without bedrails? 3  -LS      Moving to and from a bed to a chair (including a wheelchair)? 3  -LS      Standing up from a chair using your arms (e.g., wheelchair, bedside chair)? 3  -LS      Climbing 3-5 steps with a railing? 2  -LS      To walk in hospital room? 3  -LS      AM-PAC 6 Clicks Score 18  -LS      Functional Assessment    Outcome Measure Options AM-PAC 6 Clicks Basic Mobility (PT)  -LS        User Key  (r) = Recorded By, (t) = Taken By, (c) = Cosigned By    Initials Name Provider Type    DAVE Abbi Mason, PT Physical Therapist    EH Shauna Todd, PT Physical Therapist    LS Shae Robledo, PT Physical Therapist    CL Basilia Gautam, OT Occupational Therapist    MADI Shen, PT Student PT Student           Time Calculation:         PT Charges       07/11/17 1422          Time Calculation    Start Time 1304  -      PT Received On 07/11/17  -      PT Goal Re-Cert Due Date 07/21/17  -      Time Calculation- PT    Total Timed Code Minutes- PT 23 minute(s)  -        User Key  (r) = Recorded By, (t) = Taken By, (c) = Cosigned By    Initials Name Provider Type     Shauna Todd, PT Physical Therapist          Therapy Charges for Today     Code Description Service Date Service Provider Modifiers Qty    84349708276 HC GAIT TRAINING EA 15 MIN 7/11/2017 Shauna Todd, PT GP 1    [id number] HC PT THER PROC EA 15 MIN 7/11/2017 Shauna Todd, PT GP 1          PT G-Codes  Outcome Measure Options: AM-PAC 6 Clicks Basic Mobility (PT)    Shauna Todd, PT  7/11/2017

## 2017-07-11 NOTE — PLAN OF CARE
Problem: Patient Care Overview (Adult)  Goal: Plan of Care Review  Outcome: Ongoing (interventions implemented as appropriate)    07/11/17 0636   Coping/Psychosocial Response Interventions   Plan Of Care Reviewed With patient   Patient Care Overview   Progress no change   Outcome Evaluation   Outcome Summary/Follow up Plan Pt with no acute overnight events.         Problem: Fall Risk (Adult)  Goal: Identify Related Risk Factors and Signs and Symptoms  Outcome: Ongoing (interventions implemented as appropriate)  Goal: Absence of Falls  Outcome: Ongoing (interventions implemented as appropriate)    Problem: Skin Integrity Impairment, Risk/Actual (Adult)  Goal: Identify Related Risk Factors and Signs and Symptoms  Outcome: Ongoing (interventions implemented as appropriate)    Problem: Cardiac Output, Decreased (Adult)  Goal: Identify Related Risk Factors and Signs and Symptoms  Outcome: Ongoing (interventions implemented as appropriate)  Goal: Adequate Cardiac Output/Effective Tissue Perfusion  Outcome: Ongoing (interventions implemented as appropriate)

## 2017-07-11 NOTE — PROGRESS NOTES
"[facility] Progress Note     LOS: 5 days   Patient Care Team:  No Known Provider as PCP - General  PCP:  No Known Provider    Chief Complaint:  F/u CHF, has bed bugs    SUBJECTIVE:   Patient is lying in bed states he \"doesn't feel well\".  Continues to state that he has chest pain all of the time, it is not worsened or changed.  Endorses Denies current shortness of breath.  He is lying flat in bed. Per nurse, edema is much improved    Review of Systems:   All systems have been reviewed and are negative with the exception of those mentioned above.      OBJECTIVE:    Vital Sign Min/Max for last 24 hours  Temp  Min: 97.7 °F (36.5 °C)  Max: 98.1 °F (36.7 °C)   BP  Min: 115/74  Max: 141/84   Pulse  Min: 63  Max: 76   Resp  Min: 16  Max: 16   SpO2  Min: 99 %  Max: 99 %   No Data Recorded   No Data Recorded     Flowsheet Rows         First Filed Value    Admission Height  71\" (180.3 cm) Documented at 07/05/2017 2031    Admission Weight  170 lb (77.1 kg) Documented at 07/05/2017 2031          Telemetry: nsr      Intake/Output Summary (Last 24 hours) at 07/11/17 0728  Last data filed at 07/11/17 0651   Gross per 24 hour   Intake                0 ml   Output             4625 ml   Net            -4625 ml     Intake & Output (last 3 days)       07/08 0701 - 07/09 0700 07/09 0701 - 07/10 0700 07/10 0701 - 07/11 0700 07/11 0701 - 07/12 0700    P.O. 1440       IV Piggyback 100       Total Intake(mL/kg) 1540 (20)       Urine (mL/kg/hr) 1875 (1) 1800 (1) 4625 (2.5)     Stool 1 (0) 0 (0) 0 (0)     Total Output 1876 1800 4625      Net -336 -1800 -4625              Unmeasured Urine Occurrence 3 x 1 x 3 x     Unmeasured Stool Occurrence 5 x 2 x 6 x            Physical Exam:  Physical Exam   Constitutional: He is oriented to person, place, and time. He appears well-developed and well-nourished. No distress.   Neck: Neck supple. No JVD present. No tracheal deviation present.   Cardiovascular: Normal " rate, regular rhythm, normal heart sounds and intact distal pulses.    No murmur heard.  Pulmonary/Chest: Effort normal. He has wheezes. He has no rales.   Abdominal: Soft. Bowel sounds are normal. There is no tenderness. There is no guarding.   Musculoskeletal: He exhibits edema (1+). He exhibits no tenderness.   healing ulcerations scattered on bilat LEs   Neurological: He is alert and oriented to person, place, and time.   Nursing note and vitals reviewed.       LABS/DIAGNOSTIC DATA:    Results from last 7 days  Lab Units 07/10/17  0708 07/09/17  2036 07/05/17  2047   WBC 10*3/mm3 7.15 9.20 9.35   HEMOGLOBIN g/dL 11.3* 11.8* 13.0*   HEMATOCRIT % 35.9* 36.7* 41.1   PLATELETS 10*3/mm3 174 172 235         Results from last 7 days  Lab Units 07/09/17 2036   APTT seconds 31.0*       Results from last 7 days  Lab Units 07/10/17  0708 07/09/17  2036 07/09/17  0636  07/05/17 2047   SODIUM mmol/L 137 135 137  < > 140   POTASSIUM mmol/L 3.6 3.2* 3.2*  < > 3.5   CHLORIDE mmol/L 98* 99 101  < > 101   CO2 mmol/L 32.0* 26.0 26.0  < > 31.0   BUN mg/dL 25* 23 24*  < > 19   CREATININE mg/dL 1.30 1.30 1.30  < > 1.30   CALCIUM mg/dL 8.6* 8.9 8.6*  < > 9.0   BILIRUBIN mg/dL  --   --   --   --  1.0   ALK PHOS U/L  --   --   --   --  110*   ALT (SGPT) U/L  --   --   --   --  28   AST (SGOT) U/L  --   --   --   --  37*   GLUCOSE mg/dL 150* 193* 149*  < > 138*   < > = values in this interval not displayed.    Results from last 7 days  Lab Units 07/07/17 0939   HEMOGLOBIN A1C % 7.90*       Results from last 7 days  Lab Units 07/07/17 0939   CHOLESTEROL mg/dL 199   TRIGLYCERIDES mg/dL 67   HDL CHOL mg/dL 53       Results from last 7 days  Lab Units 07/07/17  0939   TSH mIU/mL 5.525*   FREE T4 ng/dL 1.08       Results from last 7 days  Lab Units 07/10/17  0708 07/05/17  2047   BNP pg/mL 1817.0* 2432.0*       Medication Review:     aspirin 81 mg Oral Daily   atorvastatin 20 mg Oral Nightly   carvedilol 3.125 mg Oral Q12H   doxycycline  100 mg Oral Q12H   heparin (porcine) 5,000 Units Subcutaneous Q12H   insulin detemir 7 Units Subcutaneous Q12H   insulin lispro 0-7 Units Subcutaneous 4x Daily AC & at Bedtime   levothyroxine 50 mcg Oral Q AM   losartan 50 mg Oral Daily   nystatin  Topical Q12H   pantoprazole 40 mg Oral Q AM   pharmacy consult - MTM  Does not apply Daily        bumetanide (BUMEX) infusion 1 mg/hr Last Rate: 1 mg/hr (07/10/17 4783)        Principal Problem:    Acute on chronic systolic heart failure  Active Problems:    Peripheral edema    Coronary artery disease of native artery of native heart with stable angina pectoris    Essential hypertension    Hyperlipidemia LDL goal <70    DM (diabetes mellitus)    Bilateral leg ulcer    CKD (chronic kidney disease)    Tick bite    Peripheral neuropathy    Cellulitis-legs/feet with chronic ulcer- no osteomyelitis on MRI    Ischemic cardiomyopathy    Noncompliance      ASSESSMENT/PLAN:  1. Acute on chronic severe SHF with LV dysfunction  1. On diuretics, vasodilators, BBs  2. LE edema improving, continue diuresis  2. LV dysfunction with severe MR  1. LVEF 20%  2. Pt will need lifevest prior to d/c  3. Known CAD  1. S/p CABG 2015  2. On statin, ASA, BB, ARB  4. HTN  5. HLD, on atorvastatin  6. DM II  1. ssi per IM  7. Hx medical noncompliance  This is Dr. Fam Prescott-I personally reviewed the patient's chest x-ray which shows significant left pleural effusion essentially unchanged from admission.  His vital signs are stable with heart rate 60-70 blood pressure in the low 1 teens test of 141 systolic this morning.  His urine output is greater than his intake past several liters since admission.  His Bmp and BNP pending.  The patient complaining of chest pain yesterday far left side is been there continues to 24 hours clinic nonischemic in nature.  As o'clock of significant left pleural effusion and he sees had that drained before.  Has being short of breath but he still has mild tachypnea but  is not requiring nasal O2.  Physical exam general he is a thin white male who just generally looks ill  HEENT he has no JVP  Cardiovascular regular rate and rhythm with systolic murmur and 3+ pitting edema  RESP-equal bowel symmetrical expansion crackles at the right base and decreased breath sounds on left E to a changes consistent with an effusion  Skin shows 3+ pitting edema  At this point time, continue the Bumex drip and await his BNP and BMP results.  His creatinine is rising from the Bumex drip and BNP remains elevated I will probably switch him over to dobutamine drip to see how he was      MECCA Ellis   07/11/17  7:28 AM     I, Jimmy Prescott MD, personally performed the services described in this documentation  by the above named individual in my presence, and it is both accurate and complete.  07/11/17 9:08 AM

## 2017-07-11 NOTE — PLAN OF CARE
Problem: Patient Care Overview (Adult)  Goal: Plan of Care Review  Outcome: Ongoing (interventions implemented as appropriate)    07/11/17 1431   Coping/Psychosocial Response Interventions   Plan Of Care Reviewed With patient   Patient Care Overview   Progress improving   Outcome Evaluation   Outcome Summary/Follow up Plan CGA for supine to sit, min A x2 for STS from bed and for bed to chair tsf. Supervision only for LBD of socks. Continue OT POC.         Problem: Inpatient Occupational Therapy  Goal: Bed Mobility Goal LTG- OT  Outcome: Ongoing (interventions implemented as appropriate)    07/06/17 1401 07/11/17 1431   Bed Mobility OT LTG   Bed Mobility OT LTG, Date Established 07/06/17 --    Bed Mobility OT LTG, Time to Achieve 1 wk --    Bed Mobility OT LTG, Activity Type all bed mobility --    Bed Mobility OT LTG, [place] Level conditional independence --    Bed Mobility OT LTG, Outcome --  goal ongoing  (progressing)       Goal: Transfer Training Goal 1 LTG- OT  Outcome: Ongoing (interventions implemented as appropriate)    07/06/17 1401 07/11/17 1431   Transfer Training OT LTG   Transfer Training OT LTG, Date Established 07/06/17 --    Transfer Training OT LTG, Time to Achieve 1 wk --    Transfer Training OT LTG, Activity Type bed to chair /chair to bed --    Transfer Training OT LTG, [place] Level supervision required --    Transfer Training OT LTG, Assist Device other (see comments)  (with appropriate AD) --    Transfer Training OT LTG, Outcome --  goal ongoing  (progressing)       Goal: LB Dressing Goal LTG- OT  Outcome: Ongoing (interventions implemented as appropriate)    07/06/17 1401 07/11/17 1431   LB Dressing OT LTG   LB Dressing Goal OT LTG, Date Established 07/06/17 --    LB Dressing Goal OT LTG, Time to Achieve 1 wk --    LB Dressing Goal OT LTG, Activity Type alvin/doff B socks --    LB Dressing Goal OT LTG, [place] Level moderate assist (50% patient effort) --    LB Dressing Goal OT  LTG, Adaptive Equipment reacher;sock-aid --    LB Dressing Goal OT LTG, Outcome --  goal partially met  (supervision for socks)

## 2017-07-11 NOTE — PLAN OF CARE
Problem: Patient Care Overview (Adult)  Goal: Plan of Care Review  Outcome: Ongoing (interventions implemented as appropriate)    07/11/17 1420   Coping/Psychosocial Response Interventions   Plan Of Care Reviewed With patient   Outcome Evaluation   Outcome Summary/Follow up Plan pt t/f to chair and performs 2 minutes steps in place and standing ther ex. Limited by infection percautions.         Problem: Inpatient Physical Therapy  Goal: Transfer Training Goal 1 LTG- PT  Outcome: Ongoing (interventions implemented as appropriate)    07/06/17 1410 07/11/17 1420   Transfer Training PT LTG   Transfer Training PT LTG, Date Established 07/06/17 --    Transfer Training PT LTG, Time to Achieve 2 wks --    Transfer Training PT LTG, Activity Type bed to chair /chair to bed;sit to stand/stand to sit --    Transfer Training PT LTG, [place] Level supervision required --    Transfer Training PT LTG, Assist Device walker, rolling --    Transfer Training PT LTG, Outcome --  goal ongoing       Goal: Gait Training Goal LTG- PT  Outcome: Ongoing (interventions implemented as appropriate)    07/06/17 1410 07/11/17 1420   Gait Training PT LTG   Gait Training Goal PT LTG, Date Established 07/06/17 --    Gait Training Goal PT LTG, Time to Achieve 2 wks --    Gait Training Goal PT LTG, [place] Level supervision required --    Gait Training Goal PT LTG, Assist Device walker, rolling --    Gait Training Goal PT LTG, Distance to Achieve 200 --    Gait Training Goal PT LTG, Outcome --  goal ongoing       Goal: Dynamic Standing Balance Goal LTG- PT  Outcome: Ongoing (interventions implemented as appropriate)    07/06/17 1410 07/09/17 1207 07/11/17 1420   Dynamic Standing Balance PT LTG   Dynamic Standing Balance PT LTG, Date Established 07/06/17 --  --    Dynamic Standing Balance PT LTG, Time to Achieve 2 wks --  --    Dynamic Standing Balance PT LTG, [place] Level --  conditional independence --    Dynamic Standing Balance PT  LTG, Assist Device --  assistive Device --    Dynamic Standing Balance PT LTG, Date Goal Reviewed --  07/09/17  (previous goal achieved 7/9) --    Dynamic Standing Balance PT LTG, Outcome --  --  goal ongoing

## 2017-07-11 NOTE — PROGRESS NOTES
[facility] Medicine Services  INPATIENT PROGRESS NOTE    Date of Admission: 7/5/2017  Length of Stay: 5  Primary Care Physician: No Known Provider    Subjective-- HPI/Events overnight/ROS/CC- Hospital follow visit.  Detailed ROS not detailed as performed below      Sleeping, easily arousable, no new complaints. No fever or chills.    Objective      Temp:  [97.7 °F (36.5 °C)-98.1 °F (36.7 °C)] 98.1 °F (36.7 °C)  Heart Rate:  [63-76] 73  Resp:  [16] 16  BP: (123-141)/(77-84) 132/83    Physical Exam:  Physical Exam      NAD  RRR  CTAB  Abd soft and NT  LE edema stable and unchanged- scattered erythematous lesion on legs are stable. Ulcerations on bilat great toe and 3rd digits are stable.  Scrotal edema improved/stable  Nonfocal      Results Review:    I have reviewed the labs, radiology results and diagnostic studies.      Results from last 7 days  Lab Units 07/10/17  0708   WBC 10*3/mm3 7.15   HEMOGLOBIN g/dL 11.3*   PLATELETS 10*3/mm3 174       Results from last 7 days  Lab Units 07/11/17  0900   SODIUM mmol/L 139   POTASSIUM mmol/L 3.1*   CO2 mmol/L 37.0*   CREATININE mg/dL 1.20   GLUCOSE mg/dL 114*       Culture Data:  Radiology Data:     I have reviewed the medications.        Assessment/Plan     Assessment/Problem List  70 yo M, non-smoker, with history CAD, HTN, HLD, and DM2, who is noncompliant with medical therapy, reportedly due to financial difficulties, came in with worsening LE swelling and pain, +constant CP/dyspnea. He also reported a recent fall and c/o back pain/R rib cage pain.     Principal Problem:    Acute on chronic systolic heart failure  Active Problems:    Peripheral edema    Coronary artery disease of native artery of native heart with stable angina pectoris    Essential hypertension    Hyperlipidemia LDL goal <70    DM (diabetes mellitus)    Bilateral leg ulcer    CKD (chronic kidney disease)    Tick bite    Peripheral neuropathy    Cellulitis-legs/feet with  chronic ulcer- no osteomyelitis on MRI    Ischemic cardiomyopathy    Noncompliance    Plan  - Management of ICM/CHF & volume overload per cardiology, diuresing well, BP ok. Will need life vest at DC.  - Spoke with pt again about functional decline and benefits of rehab, he is agreeable now, so will ask CM to submit referrals  - Cont on abx  - Arterial duplex showed no significant stenosis. Feet ulcers are likely due to poor fitting shoes.    Monet Kent MD   07/11/17   4:16 PM    Please note that portions of this note may have been completed with a voice recognition program. Efforts were made to edit the dictations, but occasionally words are mistranscribed.

## 2017-07-12 ENCOUNTER — APPOINTMENT (OUTPATIENT)
Dept: GENERAL RADIOLOGY | Facility: HOSPITAL | Age: 72
End: 2017-07-12

## 2017-07-12 LAB
ANION GAP SERPL CALCULATED.3IONS-SCNC: 7 MMOL/L (ref 3–11)
BNP SERPL-MCNC: 690 PG/ML (ref 0–100)
BUN BLD-MCNC: 43 MG/DL (ref 9–23)
BUN/CREAT SERPL: 26.9 (ref 7–25)
CALCIUM SPEC-SCNC: 9.1 MG/DL (ref 8.7–10.4)
CHLORIDE SERPL-SCNC: 93 MMOL/L (ref 99–109)
CO2 SERPL-SCNC: 36 MMOL/L (ref 20–31)
CREAT BLD-MCNC: 1.6 MG/DL (ref 0.6–1.3)
FERRITIN SERPL-MCNC: 81 NG/ML (ref 22–322)
FOLATE SERPL-MCNC: 7.24 NG/ML (ref 3.2–20)
GFR SERPL CREATININE-BSD FRML MDRD: 43 ML/MIN/1.73
GLUCOSE BLD-MCNC: 192 MG/DL (ref 70–100)
GLUCOSE BLDC GLUCOMTR-MCNC: 141 MG/DL (ref 70–130)
GLUCOSE BLDC GLUCOMTR-MCNC: 182 MG/DL (ref 70–130)
GLUCOSE BLDC GLUCOMTR-MCNC: 197 MG/DL (ref 70–130)
GLUCOSE BLDC GLUCOMTR-MCNC: 222 MG/DL (ref 70–130)
IRON 24H UR-MRATE: 57 MCG/DL (ref 50–175)
IRON SATN MFR SERPL: 19 % (ref 20–50)
POTASSIUM BLD-SCNC: 3.3 MMOL/L (ref 3.5–5.5)
SODIUM BLD-SCNC: 136 MMOL/L (ref 132–146)
TIBC SERPL-MCNC: 304 MCG/DL (ref 250–450)
VIT B12 BLD-MCNC: 346 PG/ML (ref 211–911)

## 2017-07-12 PROCEDURE — 82607 VITAMIN B-12: CPT | Performed by: HOSPITALIST

## 2017-07-12 PROCEDURE — 25010000002 HEPARIN (PORCINE) PER 1000 UNITS: Performed by: NURSE PRACTITIONER

## 2017-07-12 PROCEDURE — 99232 SBSQ HOSP IP/OBS MODERATE 35: CPT | Performed by: HOSPITALIST

## 2017-07-12 PROCEDURE — 82746 ASSAY OF FOLIC ACID SERUM: CPT | Performed by: HOSPITALIST

## 2017-07-12 PROCEDURE — 80048 BASIC METABOLIC PNL TOTAL CA: CPT | Performed by: INTERNAL MEDICINE

## 2017-07-12 PROCEDURE — 82962 GLUCOSE BLOOD TEST: CPT

## 2017-07-12 PROCEDURE — 83880 ASSAY OF NATRIURETIC PEPTIDE: CPT | Performed by: INTERNAL MEDICINE

## 2017-07-12 PROCEDURE — 83550 IRON BINDING TEST: CPT | Performed by: HOSPITALIST

## 2017-07-12 PROCEDURE — 71035 HC CHEST SINGLE DECUBITUS: CPT

## 2017-07-12 PROCEDURE — 97116 GAIT TRAINING THERAPY: CPT

## 2017-07-12 PROCEDURE — 82728 ASSAY OF FERRITIN: CPT | Performed by: HOSPITALIST

## 2017-07-12 PROCEDURE — 97110 THERAPEUTIC EXERCISES: CPT

## 2017-07-12 PROCEDURE — 63710000001 INSULIN DETEMIR PER 5 UNITS: Performed by: HOSPITALIST

## 2017-07-12 PROCEDURE — 83540 ASSAY OF IRON: CPT | Performed by: HOSPITALIST

## 2017-07-12 PROCEDURE — G0108 DIAB MANAGE TRN  PER INDIV: HCPCS | Performed by: REGISTERED NURSE

## 2017-07-12 PROCEDURE — 99232 SBSQ HOSP IP/OBS MODERATE 35: CPT | Performed by: INTERNAL MEDICINE

## 2017-07-12 RX ORDER — POTASSIUM CHLORIDE 1.5 G/1.77G
40 POWDER, FOR SOLUTION ORAL AS NEEDED
Status: DISCONTINUED | OUTPATIENT
Start: 2017-07-12 | End: 2017-07-21 | Stop reason: HOSPADM

## 2017-07-12 RX ORDER — POTASSIUM CHLORIDE 750 MG/1
40 CAPSULE, EXTENDED RELEASE ORAL AS NEEDED
Status: DISCONTINUED | OUTPATIENT
Start: 2017-07-12 | End: 2017-07-21 | Stop reason: HOSPADM

## 2017-07-12 RX ADMIN — INSULIN DETEMIR 7 UNITS: 100 INJECTION, SOLUTION SUBCUTANEOUS at 21:28

## 2017-07-12 RX ADMIN — LOSARTAN POTASSIUM 50 MG: 50 TABLET, FILM COATED ORAL at 09:11

## 2017-07-12 RX ADMIN — POTASSIUM CHLORIDE 40 MEQ: 750 CAPSULE, EXTENDED RELEASE ORAL at 17:19

## 2017-07-12 RX ADMIN — BUMETANIDE 1 MG/HR: 0.25 INJECTION INTRAMUSCULAR; INTRAVENOUS at 09:09

## 2017-07-12 RX ADMIN — INSULIN DETEMIR 7 UNITS: 100 INJECTION, SOLUTION SUBCUTANEOUS at 09:11

## 2017-07-12 RX ADMIN — INSULIN LISPRO 3 UNITS: 100 INJECTION, SOLUTION INTRAVENOUS; SUBCUTANEOUS at 21:26

## 2017-07-12 RX ADMIN — NYSTATIN: 100000 POWDER TOPICAL at 09:10

## 2017-07-12 RX ADMIN — DOXYCYCLINE HYCLATE 100 MG: 100 CAPSULE ORAL at 21:25

## 2017-07-12 RX ADMIN — INSULIN LISPRO 2 UNITS: 100 INJECTION, SOLUTION INTRAVENOUS; SUBCUTANEOUS at 12:58

## 2017-07-12 RX ADMIN — CARVEDILOL 3.12 MG: 3.12 TABLET, FILM COATED ORAL at 09:11

## 2017-07-12 RX ADMIN — PANTOPRAZOLE SODIUM 40 MG: 40 TABLET, DELAYED RELEASE ORAL at 05:23

## 2017-07-12 RX ADMIN — ASPIRIN 81 MG: 81 TABLET, COATED ORAL at 09:11

## 2017-07-12 RX ADMIN — LEVOTHYROXINE SODIUM 50 MCG: 50 TABLET ORAL at 05:24

## 2017-07-12 RX ADMIN — INSULIN LISPRO 2 UNITS: 100 INJECTION, SOLUTION INTRAVENOUS; SUBCUTANEOUS at 09:09

## 2017-07-12 RX ADMIN — CARVEDILOL 3.12 MG: 3.12 TABLET, FILM COATED ORAL at 21:25

## 2017-07-12 RX ADMIN — HEPARIN SODIUM 5000 UNITS: 5000 INJECTION, SOLUTION INTRAVENOUS; SUBCUTANEOUS at 09:09

## 2017-07-12 RX ADMIN — ATORVASTATIN CALCIUM 20 MG: 20 TABLET, FILM COATED ORAL at 21:25

## 2017-07-12 RX ADMIN — NYSTATIN: 100000 POWDER TOPICAL at 21:28

## 2017-07-12 RX ADMIN — DOXYCYCLINE HYCLATE 100 MG: 100 CAPSULE ORAL at 09:11

## 2017-07-12 RX ADMIN — HEPARIN SODIUM 5000 UNITS: 5000 INJECTION, SOLUTION INTRAVENOUS; SUBCUTANEOUS at 21:25

## 2017-07-12 NOTE — PROGRESS NOTES
Continued Stay Note  [facility]     Patient Name: Luis F Plummer  MRN: [id number]  Today's Date: 7/12/2017    Admit Date: 7/5/2017          Discharge Plan       07/12/17 1051    Case Management/Social Work Plan    Plan rehab    Patient/Family In Agreement With Plan yes    Additional Comments Spoke with patient at bedside regarding preference for rehab.  Patient has chosen [facility] in [facility].  CM to fax referral to Joana Hernandez in admissions [phone].  Awaiting callback.  CM following.              Discharge Codes     None        Expected Discharge Date and Time     Expected Discharge Date Expected Discharge Time    Jul 12, 2017             Joselin Gong RN

## 2017-07-12 NOTE — PROGRESS NOTES
[facility] Medicine Services  INPATIENT PROGRESS NOTE    Date of Admission: 7/5/2017  Length of Stay: 6  Primary Care Physician: No Known Provider    Subjective-- HPI/Events overnight/ROS/CC- Hospital follow visit.  Detailed ROS not detailed as performed below      Sitting up in chair, no new complaints. No family present. No fever or chills.    Objective      Temp:  [97.9 °F (36.6 °C)-98.7 °F (37.1 °C)] 98.7 °F (37.1 °C)  Heart Rate:  [72-82] 77  Resp:  [14-20] 20  BP: (104-148)/(46-94) 118/83    Physical Exam:  Physical Exam    NAD  RRR  CTAB  Abd soft and NT  LE edema stable and unchanged- scattered erythematous lesion on legs are stable. Ulcerations on bilat great toe and 3rd digits are stable.  Scrotal edema improved/stable  Nonfocal    Results Review:    I have reviewed the labs, radiology results and diagnostic studies.      Results from last 7 days  Lab Units 07/10/17  0708   WBC 10*3/mm3 7.15   HEMOGLOBIN g/dL 11.3*   PLATELETS 10*3/mm3 174       Results from last 7 days  Lab Units 07/12/17  0925   SODIUM mmol/L 136   POTASSIUM mmol/L 3.3*   CO2 mmol/L 36.0*   CREATININE mg/dL 1.60*   GLUCOSE mg/dL 192*       Culture Data:  Radiology Data:     I have reviewed the medications.        Assessment/Plan     Assessment/Problem List  Problems:    Acute on chronic systolic heart failure/LV dysfunction-EF 15-20%   - management per card/needs life vest at DC      Peripheral edema   - due to above   - diuresing well      Coronary artery disease of native artery of native heart with stable angina pectoris   - c/o constant L chest pain, suspect MSK   - meds per card      Essential hypertension   - adequately controlled at this time      Hyperlipidemia LDL goal <70   - Statin      DM (diabetes mellitus)   - A1C 7.9   - Adjust insulin PRN       EDNA on CKD (chronic kidney disease) stage III   - poss from diuretics, monitor, may need to cut back some on diuretics if worsening      Tick bite   -  Tick found on patient and removed   - Cont on Doxy x 14 day course      Peripheral neuropathy   - due to DM2      Cellulitis-legs/feet with chronic ulcer- no osteomyelitis on MRI   - likely poor fitting shoes, may need to get diabetic walking shoes at DC if we have them      Noncompliance/Financial difficulties   - SW following       Gen weakness   - multifactorial   - PT/OT       Anemia   - likely chronic   - basic blood work and Guaiac stool      HypoKalemia   - replete      Pleural effusion   - Card order for repeat CXR, poss need for thoracentesis if significant effusion      Dispo: SNF for rehab once medically stable, CM already submitted referral      Monet Kent MD   07/12/17   3:14 PM    Please note that portions of this note may have been completed with a voice recognition program. Efforts were made to edit the dictations, but occasionally words are mistranscribed.

## 2017-07-12 NOTE — PLAN OF CARE
Problem: Patient Care Overview (Adult)  Goal: Plan of Care Review    07/12/17 0444 07/12/17 1415   Coping/Psychosocial Response Interventions   Plan Of Care Reviewed With --  patient   Patient Care Overview   Progress no change --    Outcome Evaluation   Outcome Summary/Follow up Plan --  Pt ambulates in krishnan x 90 ft with 2 standing rest breaks using RWx. Pt has improved control of RWx during turns this date.       Goal: Adult Individualization and Mutuality  Outcome: Ongoing (interventions implemented as appropriate)  Goal: Discharge Needs Assessment  Outcome: Ongoing (interventions implemented as appropriate)    Problem: Fall Risk (Adult)  Goal: Identify Related Risk Factors and Signs and Symptoms  Outcome: Ongoing (interventions implemented as appropriate)  Goal: Absence of Falls  Outcome: Ongoing (interventions implemented as appropriate)    Problem: Skin Integrity Impairment, Risk/Actual (Adult)  Goal: Identify Related Risk Factors and Signs and Symptoms  Outcome: Ongoing (interventions implemented as appropriate)    Problem: Cardiac Output, Decreased (Adult)  Goal: Identify Related Risk Factors and Signs and Symptoms  Outcome: Ongoing (interventions implemented as appropriate)  Goal: Adequate Cardiac Output/Effective Tissue Perfusion  Outcome: Ongoing (interventions implemented as appropriate)

## 2017-07-12 NOTE — DISCHARGE PLACEMENT REQUEST
"LIZZ Hood, RN  Case Management  [phone]      Luis F Plummer (71 y.o. Male)     Date of Birth Social Security Number Address Home Phone MRN    1945  [address] [place] [id number] [phone] [id number]    Judaism Marital Status          None Single       Admission Date Admission Type Admitting Provider Attending Provider Department, Room/Bed    7/5/17 Emergency Monet Kent MD K[place]hong, Phonekeo, MD [facility] 6B, N640/1    Discharge Date Discharge Disposition Discharge Destination                      Attending Provider: Monet Kent MD     Allergies:  Torsemide, Sulfa Antibiotics    Isolation:  None   Infection:  MRSA (01/27/15)   Code Status:  Conditional    Ht:  71\" (180.3 cm)   Wt:  170 lb (77.1 kg)    Admission Cmt:  None   Principal Problem:  Acute on chronic systolic heart failure [I50.23] More...                 Active Insurance as of 7/5/2017     Primary Coverage     Payor Plan Insurance Group Employer/Plan Group    MEDICARE MEDICARE A & B      Payor Plan Address Payor Plan Phone Number Effective From Effective To    [address] [phone] 10/1/1992     [place] [address]       Subscriber Name Subscriber Birth Date Member ID       LUIS F PLUMMER 1945 [id number]                 Emergency Contacts      (Rel.) Home Phone Work Phone Mobile Phone    No,Contact (Other) -- -- [phone]            Insurance Information                MEDICARE/MEDICARE A & B Phone: [phone]    Subscriber: Luis F Plummer Subscriber#: [id number]    Group#:  Precert#:              History & Physical      Krista Aceves MD at 7/6/2017  4:42 AM              [facility] Medicine Services  HISTORY AND PHYSICAL    Primary Care Physician: No Known Provider    Subjective     Chief Complaint:  edema    History of Present Illness:     70 y/o pleasant male w/ poor insight into medical " "problems who is here w/ recurrent ble swelling which he states has worsened for last 4 days; states he is allergic to diuretics and refused lasix bc makes him burn when he pees; states torsemide causes ble ulcerations.  States he is able to take bumex but does not have supply of this at home but states he would not routinely take it if he did.  No chest pain/dyspnea.      Review of Systems   Otherwise complete ROS performed and negative except as mentioned in the HPI.    Past Medical History:   Diagnosis Date   • Diabetes mellitus        Past Surgical History:   Procedure Laterality Date   • BACK SURGERY         History reviewed. No pertinent family history.    Social History     Social History   • Marital status: Single     Spouse name: N/A   • Number of children: N/A   • Years of education: N/A     Occupational History   • Not on file.     Social History Main Topics   • Smoking status: Never Smoker   • Smokeless tobacco: Not on file   • Alcohol use No   • Drug use: Not on file   • Sexual activity: Not on file     Other Topics Concern   • Not on file     Social History Narrative   • No narrative on file       Medications:  Prescriptions Prior to Admission   Medication Sig Dispense Refill Last Dose   • aspirin 81 MG chewable tablet Chew 81 mg Daily.      • traZODone (DESYREL) 50 MG tablet Take 50 mg by mouth.          Allergies:  Allergies   Allergen Reactions   • Sulfa Antibiotics          Objective     Physical Exam:  Vital Signs: BP (!) 167/119 (BP Location: Right arm, Patient Position: Lying)  Pulse 77  Temp 98.1 °F (36.7 °C) (Oral)   Resp 16  Ht 71\" (180.3 cm)  Wt 170 lb (77.1 kg)  SpO2 98%  BMI 23.71 kg/m2  Physical Exam  Gen; alert, oriented, nad  Heent; perrla, eomi  Cv; rrr, no mrg  L; ctab, no wheeze/crackles  Abd; soft, +bs, ntnd  Ext; 3+ ble edema w/ multiple bug bites/scabs and old and current ulcerations; erythematous ble  Skin; see above  Neuro; grossly intact  Psych; mood and affect " "appropriate      Results Reviewed:    Results from last 7 days  Lab Units 07/05/17 2047   WBC 10*3/mm3 9.35   HEMOGLOBIN g/dL 13.0*   PLATELETS 10*3/mm3 235       Results from last 7 days  Lab Units 07/05/17 2047   SODIUM mmol/L 140   POTASSIUM mmol/L 3.5   CO2 mmol/L 31.0   CREATININE mg/dL 1.30   GLUCOSE mg/dL 138*   CALCIUM mg/dL 9.0       I have personally reviewed and interpreted available lab data, radiology studies and ECG obtained at time of admission.     Assessment / Plan     Assessment/Problem List:   Hospital Problem List     Peripheral edema    Chronic systolic CHF (congestive heart failure)    CAD (coronary artery disease)    HTN (hypertension)    Hyperlipidemia    DM (diabetes mellitus)    Bilateral leg ulcer    CKD (chronic kidney disease)            Plan:  1. BLE edema w/ chronic systolic CHF: pt received edecrine in ER; will change to bumex in a.m. As pt states he has tolerated this in past and try to encourage him to adhere to dietary changes as well as instructed on proper diuretic use to avoid hospitalization.  Obtain echo and cards consult as well.  2. BLE leg ulcerations; some appear to be from bug bites/scabs; others likely from extensive edema; wound care to see this a.m.  3. Cad/cabg 3v; stable  4. Htn; stable  5. Hyperlipidemia; stable  6. DM; ssi      DVT prophylaxis: heparin  Code Status: conditional (\"5 minutes of chest compressions\" but no mechanical ventilation)  Admission Status: Patient will be admitted to  INPATIENT status due to the need for care which can only be reasonably provided in an hospital setting such as aggressive/expedited ancillary services and/or consultation services and the necessity for IV medications, close physician monitoring and/or the possible need for procedures.  In such, I feel patient’s risk for adverse outcomes and need for care warrant INPATIENT evaluation and predict the patient’s care encounter to likely last beyond 2 midnights.    Krista Aceves MD " 07/06/17 4:42 AM           Electronically signed by Krista Aceves MD at 7/6/2017  4:56 AM        Hospital Medications (active)       Dose Frequency Start End    acetaminophen (TYLENOL) tablet 650 mg 650 mg Every 4 Hours PRN 7/6/2017     Sig - Route: Take 2 tablets by mouth Every 4 (Four) Hours As Needed for Mild Pain (1-3). - Oral    aspirin EC tablet 81 mg 81 mg Daily 7/6/2017     Sig - Route: Take 1 tablet by mouth Daily. - Oral    atorvastatin (LIPITOR) tablet 20 mg 20 mg Nightly 7/6/2017     Sig - Route: Take 1 tablet by mouth Every Night. - Oral    bumetanide (BUMEX) 10 mg in sodium chloride 0.9 % 100 mL (0.1 mg/mL) infusion 1 mg/hr Continuous 7/10/2017     Sig - Route: Infuse 1 mg/hr into a venous catheter Continuous. - Intravenous    calcium carbonate (TUMS) chewable tablet 500 mg (200 mg elemental) 2 tablet 2 Times Daily PRN 7/6/2017     Sig - Route: Chew 1,000 mg 2 (Two) Times a Day As Needed for Heartburn. - Oral    carvedilol (COREG) tablet 3.125 mg 3.125 mg Every 12 Hours Scheduled 7/6/2017     Sig - Route: Take 1 tablet by mouth Every 12 (Twelve) Hours. - Oral    dextrose (D50W) solution 25 g 25 g Every 15 Minutes PRN 7/6/2017     Sig - Route: Infuse 50 mL into a venous catheter Every 15 (Fifteen) Minutes As Needed for Low Blood Sugar (Blood Sugar Less Than 70, Patient Has IV Access - Unresponsive, NPO or Unable To Safely Swallow). - Intravenous    dextrose (GLUTOSE) oral gel 15 g 15 g Every 15 Minutes PRN 7/6/2017     Sig - Route: Take 15 g by mouth Every 15 (Fifteen) Minutes As Needed for Low Blood Sugar (Blood Sugar Less Than 70, Patient Alert, Is Not NPO & Can Safely Swallow). - Oral    doxycycline (VIBRAMYCIN) capsule 100 mg 100 mg Every 12 Hours Scheduled 7/10/2017     Sig - Route: Take 1 capsule by mouth Every 12 (Twelve) Hours. - Oral    glucagon (GLUCAGEN) injection 1 mg 1 mg Every 15 Minutes PRN 7/6/2017     Sig - Route: Inject 1 mg under the skin Every 15 (Fifteen) Minutes As Needed (Blood  Glucose Less Than 70 - Patient Without IV Access - Unresponsive, NPO or Unable To Safely Swallow). - Subcutaneous    heparin (porcine) 5000 UNIT/ML injection 5,000 Units 5,000 Units Every 12 Hours Scheduled 7/6/2017     Sig - Route: Inject 1 mL under the skin Every 12 (Twelve) Hours. - Subcutaneous    insulin detemir (LEVEMIR) injection 7 Units 7 Units Every 12 Hours Scheduled 7/10/2017     Sig - Route: Inject 7 Units under the skin Every 12 (Twelve) Hours. - Subcutaneous    insulin lispro (humaLOG) injection 0-7 Units 0-7 Units 4 Times Daily Before Meals & Nightly 7/6/2017     Sig - Route: Inject 0-7 Units under the skin 4 (Four) Times a Day Before Meals & at Bedtime. - Subcutaneous    levothyroxine (SYNTHROID, LEVOTHROID) tablet 50 mcg 50 mcg Every Early Morning 7/7/2017     Sig - Route: Take 1 tablet by mouth Every Morning. - Oral    losartan (COZAAR) tablet 50 mg 50 mg Daily 7/6/2017     Sig - Route: Take 1 tablet by mouth Daily. - Oral    nitroglycerin (NITROSTAT) SL tablet 0.4 mg 0.4 mg Every 5 Minutes PRN 7/6/2017     Sig - Route: Place 1 tablet under the tongue Every 5 (Five) Minutes As Needed for Chest Pain. - Sublingual    nystatin (MYCOSTATIN) powder  Every 12 Hours Scheduled 7/6/2017     Sig - Route: Apply  topically Every 12 (Twelve) Hours. - Topical    pantoprazole (PROTONIX) EC tablet 40 mg 40 mg Every Early Morning 7/7/2017     Sig - Route: Take 1 tablet by mouth Every Morning. - Oral    Pharmacy Consult - MTM  Daily 7/10/2017     Sig - Route: Daily. - Does not apply    sodium chloride 0.9 % flush 1-10 mL 1-10 mL As Needed 7/6/2017     Sig - Route: Infuse 1-10 mL into a venous catheter As Needed for Line Care. - Intravenous             Physician Progress Notes (last 24 hours) (Notes from 7/11/2017 10:56 AM through 7/12/2017 10:56 AM)      Monet Kent MD at 7/11/2017  4:16 PM  Version 1 of 1             [facility]  INPATIENT PROGRESS NOTE    Date of  Admission: 7/5/2017  Length of Stay: 5  Primary Care Physician: No Known Provider    Subjective-- HPI/Events overnight/ROS/CC- Hospital follow visit.  Detailed ROS not detailed as performed below      Sleeping, easily arousable, no new complaints. No fever or chills.    Objective      Temp:  [97.7 °F (36.5 °C)-98.1 °F (36.7 °C)] 98.1 °F (36.7 °C)  Heart Rate:  [63-76] 73  Resp:  [16] 16  BP: (123-141)/(77-84) 132/83    Physical Exam:  Physical Exam      NAD  RRR  CTAB  Abd soft and NT  LE edema stable and unchanged- scattered erythematous lesion on legs are stable. Ulcerations on bilat great toe and 3rd digits are stable.  Scrotal edema improved/stable  Nonfocal      Results Review:    I have reviewed the labs, radiology results and diagnostic studies.      Results from last 7 days  Lab Units 07/10/17  0708   WBC 10*3/mm3 7.15   HEMOGLOBIN g/dL 11.3*   PLATELETS 10*3/mm3 174       Results from last 7 days  Lab Units 07/11/17  0900   SODIUM mmol/L 139   POTASSIUM mmol/L 3.1*   CO2 mmol/L 37.0*   CREATININE mg/dL 1.20   GLUCOSE mg/dL 114*       Culture Data:  Radiology Data:     I have reviewed the medications.        Assessment/Plan     Assessment/Problem List  72 yo M, non-smoker, with history CAD, HTN, HLD, and DM2, who is noncompliant with medical therapy, reportedly due to financial difficulties, came in with worsening LE swelling and pain, +constant CP/dyspnea. He also reported a recent fall and c/o back pain/R rib cage pain.     Principal Problem:    Acute on chronic systolic heart failure  Active Problems:    Peripheral edema    Coronary artery disease of native artery of native heart with stable angina pectoris    Essential hypertension    Hyperlipidemia LDL goal <70    DM (diabetes mellitus)    Bilateral leg ulcer    CKD (chronic kidney disease)    Tick bite    Peripheral neuropathy    Cellulitis-legs/feet with chronic ulcer- no osteomyelitis on MRI    Ischemic cardiomyopathy    Noncompliance    Plan  -  Management of ICM/CHF & volume overload per cardiology, diuresing well, BP ok. Will need life vest at DC.  - Spoke with pt again about functional decline and benefits of rehab, he is agreeable now, so will ask CM to submit referrals  - Cont on abx  - Arterial duplex showed no significant stenosis. Feet ulcers are likely due to poor fitting shoes.    Monet Kent MD   07/11/17   4:16 PM    Please note that portions of this note may have been completed with a voice recognition program. Efforts were made to edit the dictations, but occasionally words are mistranscribed.         Electronically signed by Monet Kent MD at 7/11/2017  4:22 PM      Jimmy Prescott MD at 7/12/2017  7:54 AM  Version 1 of 1         [facility]  IP Progress Note      Chief Complaint/Reason for service:  Ischemic cardiomyopathy with severe heart failure    Subjective: The patient still complaining of chest pain but it's very atypical it's been there for 48 hours and is clearly nonischemic.  He looks frail but he states his breathing is good    Past medical, surgical, social and family history reviewed in the patient's electronic medical record.      Objective  Vital Sign Min/Max for last 24 hours  Temp  Min: 97.9 °F (36.6 °C)  Max: 98.2 °F (36.8 °C)   BP  Min: 104/46  Max: 148/94   Pulse  Min: 67  Max: 82   Resp  Min: 14  Max: 20   SpO2  Min: 97 %  Max: 97 %   No Data Recorded      Intake/Output Summary (Last 24 hours) at 07/12/17 0753  Last data filed at 07/12/17 0625   Gross per 24 hour   Intake             1500 ml   Output             5200 ml   Net            -3700 ml             Current Facility-Administered Medications:   •  acetaminophen (TYLENOL) tablet 650 mg, 650 mg, Oral, Q4H PRN, Gris Irizarry V APRN, 650 mg at 07/11/17 2130  •  aspirin EC tablet 81 mg, 81 mg, Oral, Daily, Maryan Alonso APRN, 81 mg at 07/11/17 1043  •  atorvastatin (LIPITOR) tablet 20 mg, 20 mg,  Oral, Nightly, FRANCO Wood, 20 mg at 07/11/17 2124  •  bumetanide (BUMEX) 10 mg in sodium chloride 0.9 % 100 mL (0.1 mg/mL) infusion, 1 mg/hr, Intravenous, Continuous, Jimmy Prescott MD, Last Rate: 10 mL/hr at 07/11/17 2130, 1 mg/hr at 07/11/17 2130  •  calcium carbonate (TUMS) chewable tablet 500 mg (200 mg elemental), 2 tablet, Oral, BID PRN, Gris WOODARD APRN, 2 tablet at 07/08/17 0901  •  carvedilol (COREG) tablet 3.125 mg, 3.125 mg, Oral, Q12H, FRANCO Wood, 3.125 mg at 07/11/17 2124  •  dextrose (D50W) solution 25 g, 25 g, Intravenous, Q15 Min PRN, Monet Kent MD  •  dextrose (GLUTOSE) oral gel 15 g, 15 g, Oral, Q15 Min PRN, Monet Kent MD  •  doxycycline (VIBRAMYCIN) capsule 100 mg, 100 mg, Oral, Q12H, Cy Banerjee, [facility], 100 mg at 07/11/17 2126  •  glucagon (GLUCAGEN) injection 1 mg, 1 mg, Subcutaneous, Q15 Min PRN, Monet Kent MD  •  heparin (porcine) 5000 UNIT/ML injection 5,000 Units, 5,000 Units, Subcutaneous, Q12H, FRANCO Delgado, 5,000 Units at 07/11/17 2124  •  insulin detemir (LEVEMIR) injection 7 Units, 7 Units, Subcutaneous, Q12H, Monet Kent MD, 7 Units at 07/11/17 2132  •  insulin lispro (humaLOG) injection 0-7 Units, 0-7 Units, Subcutaneous, 4x Daily AC & at Bedtime, Monet Kent MD, 2 Units at 07/11/17 1748  •  levothyroxine (SYNTHROID, LEVOTHROID) tablet 50 mcg, 50 mcg, Oral, Q AM, Monet Kent MD, 50 mcg at 07/12/17 0524  •  losartan (COZAAR) tablet 50 mg, 50 mg, Oral, Daily, Grisnilam Irizarry V APRN, 50 mg at 07/11/17 1042  •  nitroglycerin (NITROSTAT) SL tablet 0.4 mg, 0.4 mg, Sublingual, Q5 Min PRN, Grisnilam Irizarry V, APRN, 0.4 mg at 07/09/17 2036  •  nystatin (MYCOSTATIN) powder, , Topical, Q12H, Monet Kent MD  •  pantoprazole (PROTONIX) EC tablet 40 mg, 40 mg, Oral, Q AM, Monet Kent MD, 40 mg at 07/12/17 0523  •  Pharmacy Consult - [facility], , Does not apply,  Daily, Cy Banerjee [facility]  •  sodium chloride 0.9 % flush 1-10 mL, 1-10 mL, Intravenous, PRN, Gris Irizarry V, APRN    Physical Exam: General  thin white male who looks frail but no acute distress still has a little tachypnea        HEENT: No JVP is present there is no scleral icterus       Respiratory:  Equal bilateral symmetrical expansion with few crackles on the right lung base which are improved also has decreased breath sounds on the left with E to a changes consistent with effusion       Cardiovascular: Regular rate and rhythm without gallop or murmur and ES 2+ edema which is decreased from yesterday       GI:        Lower Extremities: He has 2+ edema but it's decreased from yesterday he has mild erythema of his legs which appears to be chronic       Neuro: Cranial nerves II through XII are grossly normal he moves all 4 extremities       Skin:  Warm and dry 2+ pitting edema to palpation       Psych: Flat affect he is hard of hearing    Results Review: I reviewed the patient's data which showed an excellent diuretic response with a net loss of 14 L since admission.  His potassium was low yesterday at 4.1 but his BNP has gone down with stable renal function and laboratory data has not been collected this morning    Radiology Results:  Imaging Results (last 72 hours)     Procedure Component Value Units Date/Time    XR Chest 1 View [[id number]]  (Abnormal) Collected:  07/09/17 2027     Updated:  07/09/17 2127    Narrative:       EXAM:    XR Chest, 1 View    CLINICAL HISTORY:    71 years old, male; Hyperlipidemia, unspecified; Atherosclerotic heart   disease of native coronary artery with other forms of angina pectoris; Acute on   chronic systolic (congestive) heart failure; Acute on chronic systolic   (congestive) heart failure; Acute pulmonary edema; Pleural effusion, not   elsewhere classified; Edema, unspecified; Other reduced mobility; Other reduced   mobility; Pain; Chest pain; Radiating; Prior surgery;  Surgery date: 6+ months;   Surgery type: Cabg; Patient HX: Chf, rapid response, pt having chest pain   radiates to left side, cabg 2 years ago; Additional info: Rrt    TECHNIQUE:    Frontal view of the chest.    EXAM DATE/TIME:    7/9/2017 8:27 PM    COMPARISON:    CR - XR CHEST 1 VW 7/5/2017 9:26:15 PM    FINDINGS:    Lungs: Streaky left lower lung opacity may represent atelectasis and/or   airspace disease. Left pleural effusion suspected.    Pleural space:  Unremarkable.  No pneumothorax.    Heart: Cardiomegaly and mild vascular congestion.    Mediastinum:  Unremarkable.    Bones/joints:  Unremarkable.    Other findings: Median sternotomy.      Impression:       Cardiomegaly and mild vascular congestion, with suspicion for left pleural   effusion.  Streaky left lower lung atelectasis and/or airspace disease.  Median sternotomy    THIS DOCUMENT HAS BEEN ELECTRONICALLY SIGNED BY DRAKE HERNDON MD          EKG:     ECHO: Severe left ventricular dysfunction with EF 20% and moderate MR    Tele:      Assessment/Plan: 1 ischemic cardiomyopathy status post prior bypass surgery also had severe heart failure-he's had an excellent diuretic response with 14 L net output since admission and has responded nicely to a Bumex drip.  His laboratory data is pending today to see what his BNP and BMP done  2 LV dysfunction will need a LifeVest prior to discharge  3 left pleural effusion the patient will get a (decubitus chest x-ray today to see if the effusion needs to be drained  4 hypokalemia follow-up potassium this morning    Jimmy Prescott MD  07/12/17  7:54 AM     Electronically signed by Jimmy Prescott MD at 7/12/2017  8:13 AM           Physical Therapy Notes (last 24 hours) (Notes from 7/11/2017 10:56 AM through 7/12/2017 10:56 AM)      Shauna Todd, PT at 7/11/2017  2:21 PM  Version 1 of 1         Problem: Patient Care Overview (Adult)  Goal: Plan of Care Review  Outcome: Ongoing (interventions implemented as  appropriate)    07/11/17 1420   Coping/Psychosocial Response Interventions   Plan Of Care Reviewed With patient   Outcome Evaluation   Outcome Summary/Follow up Plan pt t/f to chair and performs 2 minutes steps in place and standing ther ex. Limited by infection percautions.         Problem: Inpatient Physical Therapy  Goal: Transfer Training Goal 1 LTG- PT  Outcome: Ongoing (interventions implemented as appropriate)    07/06/17 1410 07/11/17 1420   Transfer Training PT LTG   Transfer Training PT LTG, Date Established 07/06/17 --    Transfer Training PT LTG, Time to Achieve 2 wks --    Transfer Training PT LTG, Activity Type bed to chair /chair to bed;sit to stand/stand to sit --    Transfer Training PT LTG, [place] Level supervision required --    Transfer Training PT LTG, Assist Device walker, rolling --    Transfer Training PT LTG, Outcome --  goal ongoing       Goal: Gait Training Goal LTG- PT  Outcome: Ongoing (interventions implemented as appropriate)    07/06/17 1410 07/11/17 1420   Gait Training PT LTG   Gait Training Goal PT LTG, Date Established 07/06/17 --    Gait Training Goal PT LTG, Time to Achieve 2 wks --    Gait Training Goal PT LTG, [place] Level supervision required --    Gait Training Goal PT LTG, Assist Device walker, rolling --    Gait Training Goal PT LTG, Distance to Achieve 200 --    Gait Training Goal PT LTG, Outcome --  goal ongoing       Goal: Dynamic Standing Balance Goal LTG- PT  Outcome: Ongoing (interventions implemented as appropriate)    07/06/17 1410 07/09/17 1207 07/11/17 1420   Dynamic Standing Balance PT LTG   Dynamic Standing Balance PT LTG, Date Established 07/06/17 --  --    Dynamic Standing Balance PT LTG, Time to Achieve 2 wks --  --    Dynamic Standing Balance PT LTG, [place] Level --  conditional independence --    Dynamic Standing Balance PT LTG, Assist Device --  assistive Device --    Dynamic Standing Balance PT LTG, Date Goal Reviewed --   17  (previous goal achieved ) --    Dynamic Standing Balance PT LTG, Outcome --  --  goal ongoing              Electronically signed by Shauna Todd PT at 2017  2:22 PM      Shauna Todd PT at 2017  2:22 PM  Version 1 of 1         Acute Care - Physical Therapy Treatment Note   [place]     Patient Name: Luis F Plummer  : 1945  MRN: [id number]  Today's Date: 2017  Onset of Illness/Injury or Date of Surgery Date: 17  Date of Referral to PT: 17  Referring Physician: FRANCO Irizarry    Admit Date: 2017    Visit Dx:    ICD-10-CM ICD-9-CM   1. Peripheral edema R60.9 782.3   2. Pleural effusion J90 511.9   3. Acute pulmonary edema J81.0 518.4   4. Acute on chronic systolic congestive heart failure I50.23 428.23     428.0   5. Hyperlipidemia LDL goal <70 E78.5 272.4   6. Acute on chronic systolic heart failure I50.23 428.23   7. Coronary artery disease of native artery of native heart with stable angina pectoris I25.118 414.01     413.9   8. Impaired mobility and ADLs Z74.09 799.89   9. Impaired functional mobility, balance, gait, and endurance Z74.09 V49.89     Patient Active Problem List   Diagnosis   • Peripheral edema   • Acute on chronic systolic heart failure   • Coronary artery disease of native artery of native heart with stable angina pectoris   • Essential hypertension   • Hyperlipidemia LDL goal <70   • DM (diabetes mellitus)   • Bilateral leg ulcer   • CKD (chronic kidney disease)   • Tick bite   • Peripheral neuropathy   • Cellulitis-legs/feet with chronic ulcer- no osteomyelitis on MRI   • Ischemic cardiomyopathy   • Noncompliance               Adult Rehabilitation Note       17 1304 07/10/17 1055 17 1129    Rehab Assessment/Intervention    Discipline physical therapist  -EH physical therapist  -MADI [facility] JB2 occupational therapist  -TODD    Document Type therapy note (daily note)  - therapy note (daily note)  -MAD[facility] JB2 therapy note  (daily note)  -CL    Subjective Information agree to therapy;no complaints  -EH agree to therapy;no complaints  -DAVE,KR,JB2 agree to therapy;complains of;pain  -CL    Patient Effort, Rehab Treatment good  -EH good  -DAVE,KR,JB2 good  -CL    Symptoms Noted During/After Treatment none  -EH fatigue  -DAVE,KR,JB2 fatigue  -CL    Precautions/Limitations fall precautions  -EH fall precautions  -DAVE,KR,JB2 fall precautions;other (see comments)   Exit alarm  -CL    Specific Treatment Considerations   Pt [facility].   -CL    Recorded by [EH] Shauna Todd, PT [DAVE,KR,JB2] Abbi Mason, PT (r) Vijaya Shen, PT Student (t) Abbi Mason, PT (c) [CL] Basilia Gautam, OT    Vital Signs    Pre Systolic BP Rehab  119  -DAVE,KR,JB2 142  -CL    Pre Treatment Diastolic BP  80  -DAVE,KR,JB2 100   RN aware, cleared for therapy.   -CL    Post Systolic BP Rehab  115  -DAVE,KR,JB2     Post Treatment Diastolic BP  74  -DAVE,KR,JB2     Pretreatment Heart Rate (beats/min)  73  -DAVE,KR,JB2     Posttreatment Heart Rate (beats/min)  73  -DAVE,KR,JB2     Pre SpO2 (%)  99  -DAVE,KR,JB2     O2 Delivery Pre Treatment  supplemental O2  -DAVE,KR,JB2     Post SpO2 (%)  99  -DAVE,KR,JB2     O2 Delivery Post Treatment  supplemental O2  -DAVE,KR,JB2     Pre Patient Position  Supine  -DAVE,KR,JB2     Intra Patient Position  Standing  -DAVE,KR,JB2     Post Patient Position  Sitting  -DAVE,KR,JB2     Recorded by  [DAVE,KR,JB2] Abbi Mason, PT (r) Vijaya Shen, PT Student (t) Abbi Mason, PT (c) [CL] Basilia Gautam, OT    Pain Assessment    Pain Assessment Ansari-Simpson FACES  -EH 0-10  -DAVE,KR,JB2 0-10  -CL    Ansari-Simposn FACES Pain Rating 4  -EH      Pain Score  0  -DAVE,KR,JB2 8  -CL    Post Pain Score  0  -DAVE,KR,JB2 8  -CL    Pain Location   Leg  -CL    Pain Orientation   Right;Left  -CL    Pain Intervention(s) Repositioned;Ambulation/increased activity  -EH  Repositioned;Ambulation/increased activity  -CL    Response to Interventions appears to have improved.  -EH  Tolerated,  RN notified.   -CL    Recorded by [EH] Shauna Todd PT [DAVE,KR,JB2] Abbi Mason, PT (r) Vijaya Shen, PT Student (t) Abbi Mason, PT (c) [CL] Basilia Gautam OT    Cognitive Assessment/Intervention    Current Cognitive/Communication Assessment functional  -EH functional  -DAVE,KR,JB2 functional  -CL    Orientation Status oriented x 4  -EH oriented to;person;place  -DAVE,KR,JB2 oriented to;person;place;required verbal cueing (specifiy in comments);time  -CL    Follows Commands/Answers Questions 100% of the time;able to follow single-step instructions;needs cueing;needs increased time;needs repetition  - able to follow single-step instructions;100% of the time;needs cueing;needs increased time;needs repetition  -DAVE,KR,JB2 100% of the time;needs increased time;needs repetition;needs cueing  -CL    Personal Safety decreased awareness, need for assist;decreased awareness, need for safety;mild impairment  - mild impairment;decreased awareness, need for assist;decreased awareness, need for safety;decreased insight to deficits  -DAVE,KR,JB2 mild impairment;decreased awareness, need for assist;decreased awareness, need for safety  -CL    Personal Safety Interventions fall prevention program maintained;gait belt;nonskid shoes/slippers when out of bed  - fall prevention program maintained;gait belt;nonskid shoes/slippers when out of bed  -DAVE,KR,JB2 fall prevention program maintained;gait belt;nonskid shoes/slippers when out of bed  -CL    Recorded by [EH] Shauna Todd, PT [DAVE,KR,JB2] Abbi Mason, PT (r) Vijaya Shen, PT Student (t) Abbi Mason, PT (c) [CL] Basilia Gautam, NANCI    Bed Mobility, Assessment/Treatment    Bed Mobility, Assistive Device   head of bed elevated  -CL    Bed Mob, Supine to Sit, [place] contact guard assist  - supervision required  -DAVE,KR,JB2 minimum assist (75% patient effort);verbal cues required  -CL    Bed Mobility, Comment Increased time needed; VC for  squencing.  -EH VC's for sequencing  -DAVE,KR,JB2 VCs for HP/sequencing.   -CL    Recorded by [] Shauna Todd, PT [DAVE,KR,JB2] Abbi Mason, PT (r) Vijaya Shen, PT Student (t) Abbi Mason, PT (c) [CL] Basilia Gautam OT    Transfer Assessment/Treatment    Transfers, Bed-Chair [place] minimum assist (75% patient effort);2 person assist required  -EH      Transfers, Bed-Chair-Bed, Assist Device rolling walker  -EH      Transfers, Sit-Stand [place] minimum assist (75% patient effort);2 person assist required   from bed; CGA x 2 from chair  -EH minimum assist (75% patient effort);verbal cues required  -DAVE,KR,JB2 minimum assist (75% patient effort);verbal cues required  -CL    Transfers, Stand-Sit [place] contact guard assist;2 person assist required  -EH minimum assist (75% patient effort);verbal cues required  -DAVE,KR,JB2 minimum assist (75% patient effort);verbal cues required  -CL    Transfers, Sit-Stand-Sit, Assist Device rolling walker  -EH  rolling walker  -CL    Toilet Transfer, [place]   contact guard assist;verbal cues required  -CL    Toilet Transfer, Assistive Device   rolling walker;bedside commode without drop arms  -CL    Transfer, Impairments strength decreased;coordination impaired;impaired balance  -EH strength decreased;impaired balance  -DAVE,KR,JB2     Transfer, Comment VC given for hand placement, movement of walker, sequencing of turn. Assist needed for control of RWx to turn  - Pt given VC's for hand placement.   -DAVE,KR,JB2 VCs for HP.   -CL    Recorded by [] Shauna Todd, PT [DAVE,KR,JB2] Abbi Mason, PT (r) Vijaya Shen, PT Student (t) Abbi Mason, PT (c) [CL] Basilia Gautam, NANCI    Gait Assessment/Treatment    Gait, [place] Level contact guard assist;verbal cues required  -EH contact guard assist;verbal cues required  -DAVE,KR,JB2     Gait, Assistive Device rolling walker  -EH rolling walker  -DAVE,KR,JB2     Gait, Distance (Feet) 5  -   -DAVE,MADI,JB2     Gait, Gait Pattern Analysis swing-to gait  - swing-to gait  -DAVE,MADI,JB2     Gait, Gait Deviations decreased heel strike;step length decreased  - anotn decreased;forward flexed posture;step length decreased;weight-shifting ability decreased  -DAVE,MADI,JB2     Gait, Safety Issues step length decreased;balance decreased during turns;weight-shifting ability decreased  - step length decreased;balance decreased during turns;supplemental O2  -DAVE,MADI,JB2     Gait, Impairments coordination impaired  -EH strength decreased;impaired balance  -DAVE,MADI,JB2     Gait, Comment Additional 2 minutes high steps in place and standing ther ex.   -EH VC's for upright posture. Pt's gait slow and labored. 2 standing rest breaks.  -DAVE,MADI,JB2     Recorded by [EH] Shauna Todd, PT [DAVE,MADI,JB2] Abbi Mason, PT (r) Vijaya Shen, PT Student (t) Abbi Mason, PT (c)     Functional Mobility    Functional Mobility- Ind. Level   contact guard assist;verbal cues required  -CL    Functional Mobility- Device   rolling walker  -CL    Functional Mobility-Distance (Feet)   70  -CL    Functional Mobility- Comment   VCs for posture.   -CL    Recorded by   [CL] Basilia Gautam OT    Toileting Assessment/Training    Toileting Assess/Train, Position   standing  -CL    Toileting Assess/Train, Indepen Level   dependent (less than 25% patient effort)  -CL    Toileting Assess/Train, Comment   Clothing management and post toilet hygiene.   -CL    Recorded by   [CL] Basilia Gautam OT    Motor Skills/Interventions    Additional Documentation  Balance Skills Training (Group)  -DAVE,MADI,JB2 Balance Skills Training (Group)  -CL    Recorded by  [DAVE,MADI,JB2] Abbi Mason, PT (r) Vijaya Shen, PT Student (t) Abbi Mason, PT (c) [CL] Basilia Gautam OT    Balance Skills Training    Sitting-Level of Assistance  Distant supervision  -DAVE,MADI,JB2 Distant supervision  -CL    Sitting-Balance Support  Right upper extremity supported;Left upper  extremity supported;Feet supported  -DAVE,MADI,JB2 Right upper extremity supported;Left upper extremity supported;Feet supported  -CL    Sitting-Balance Activities   Forward lean;Reaching for objects;Trunk control activities  -CL    Standing-Level of Assistance  Contact guard  -DAVE,MADI,JB2 Contact guard  -CL    Static Standing Balance Support  assistive device  -DAVE,MADI,JB2 assistive device  -CL    Standing-Balance Activities   Weight Shift A-P;Weight Shift R-L  -CL    Gait Balance-Level of Assistance   Contact guard  -CL    Gait Balance Support   assistive device  -CL    Recorded by  [DAVE,KR,JB2] Abbi Mason, PT (r) Vijaya Shen, PT Student (t) Abbi Mason, PT (c) [CL] Basilia Gautam, OT    Therapy Exercises    Bilateral Lower Extremities AROM:;standing;hip flexion;hip extension;hip abduction/adduction  -EH      Recorded by [EH] Shauna Todd, PT      Positioning and Restraints    Pre-Treatment Position in bed  -EH in bed  -MADI ACOSTA JB2 in bed  -CL    Post Treatment Position chair  -EH chair  -DVAE,MADI,JB2 chair  -CL    In Chair reclined;notified nsg;call light within reach;encouraged to call for assist;exit alarm on  -EH reclined;call light within reach;encouraged to call for assist;exit alarm on  -DAVE,MADI,JB2 notified nsg;reclined;call light within reach;encouraged to call for assist;exit alarm on;legs elevated  -CL    Recorded by [EH] Shauna Todd, PT [DAVE,KR,JB2] Abbi Mason, PT (r) Vijaya Shen, PT Student (t) Abbi Mason, PT (c) [CL] Basilia Gautam, OT      07/09/17 1128          Rehab Assessment/Intervention    Discipline physical therapist  -LS      Document Type therapy note (daily note)  -LS      Subjective Information agree to therapy;complains of;pain  -LS      Patient Effort, Rehab Treatment good  -LS      Symptoms Noted During/After Treatment fatigue  -LS      Precautions/Limitations fall precautions  -LS      Specific Treatment Considerations Pt [place].  -LS      Recorded by [LS] Shae SANCHEZ  Meena, PT      Vital Signs    Pre Systolic BP Rehab 142   RN aware; gave consent for therapy  -LS      Pre Treatment Diastolic   -LS      Pretreatment Heart Rate (beats/min) 76  -LS      O2 Delivery Pre Treatment room air  -LS      O2 Delivery Post Treatment room air  -LS      Pre Patient Position Supine  -LS      Intra Patient Position Standing  -LS      Post Patient Position Sitting  -LS      Recorded by [LS] Shae Robledo, PT      Pain Assessment    Pain Assessment 0-10  -LS      Pain Score 8  -LS      Post Pain Score 8  -LS      Pain Location Leg  -LS      Pain Orientation Right;Left  -LS      Pain Intervention(s) Repositioned;Ambulation/increased activity  -LS      Response to Interventions tolerated; notified RN  -LS      Recorded by [LS] Shae Robledo, PT      Cognitive Assessment/Intervention    Current Cognitive/Communication Assessment functional  -LS      Orientation Status oriented to;person;place;situation   cues for time of day  -LS      Follows Commands/Answers Questions able to follow single-step instructions;100% of the time;needs cueing  -LS      Personal Safety mild impairment;decreased awareness, need for assist;decreased awareness, need for safety;decreased insight to deficits  -LS      Personal Safety Interventions fall prevention program maintained;gait belt;nonskid shoes/slippers when out of bed  -LS      Recorded by [LS] Shae Robledo, PT      Bed Mobility, Assessment/Treatment    Bed Mobility, Assistive Device head of bed elevated  -LS      Bed Mob, Supine to Sit, [place] minimum assist (75% patient effort);verbal cues required  -LS      Bed Mob, Sit to Supine, [place] not tested  -LS      Recorded by [LS] Shae Robledo, PT      Transfer Assessment/Treatment    Transfers, Sit-Stand [place] minimum assist (75% patient effort);verbal cues required  -LS      Transfers, Stand-Sit [place] minimum assist (75% patient effort);verbal cues required  -LS      Transfers,  Sit-Stand-Sit, Assist Device rolling walker  -LS      Toilet Transfer, [place] contact guard assist  -LS      Toilet Transfer, Assistive Device rolling walker  -LS      Transfer, Impairments strength decreased;impaired balance  -LS      Transfer, Comment Min A for initial stand from EOB; pt progressed to CGA with stand from [facility]. VC's for hand placement.   -LS      Recorded by [LS] Shae Robledo, PT      Gait Assessment/Treatment    Gait, [place] Level contact guard assist;verbal cues required  -LS      Gait, Assistive Device rolling walker  -LS      Gait, Distance (Feet) 70  -LS      Gait, Gait Deviations [place] decreased;forward flexed posture;step length decreased  -LS      Gait, Impairments pain;strength decreased  -LS      Gait, Comment VC's for posture and increasing step length within RWx; distance limited by fatigue.   -LS      Recorded by [LS] Shae Robledo, PT      Motor Skills/Interventions    Additional Documentation Balance Skills Training (Group)  -LS      Recorded by [LS] Shae Robledo, PT      Balance Skills Training    Sitting-Level of Assistance Independent  -LS      Sitting-Balance Support Feet supported  -LS      Standing-Level of Assistance Contact guard  -LS      Static Standing Balance Support assistive device  -LS      Standing-Balance Activities Weight Shift A-P  -LS      Standing Balance # of Minutes 2   standing at [facility] for hygiene  -LS      Gait Balance-Level of Assistance Contact guard  -LS      Gait Balance Support assistive device  -LS      Recorded by [LS] Shae Robledo, PT      Therapy Exercises    Bilateral Lower Extremities AROM:;10 reps;sitting;ankle pumps/circles;hip flexion;LAQ  -LS      Recorded by [LS] Shae Robledo, PT      Positioning and Restraints    Pre-Treatment Position in bed  -LS      Post Treatment Position chair  -LS      In Chair notified nsg;reclined;call light within reach;encouraged to call for assist;exit alarm on;legs elevated  -LS      Recorded by  [LS] Shae Robledo, PT        User Key  (r) = Recorded By, (t) = Taken By, (c) = Cosigned By    Initials Name Effective Dates    DAVE Abbi KETAN Mason, PT 06/19/15 -     EH Shauna KETAN Todd, PT 06/19/15 -     LS Shae Robledo, PT 06/19/15 -     CL Basilia Gautam, OT 06/08/16 -     KR Vijaya Shen, PT Student 05/30/17 -                 IP PT Goals       07/11/17 1420 07/10/17 1152 07/09/17 1207    Transfer Training PT LTG    Transfer Training PT LTG, Outcome goal ongoing  -EH goal ongoing  -DAVE (r) KR (t) DAVE (c)     Gait Training PT LTG    Gait Training Goal PT LTG, Outcome goal ongoing  -EH goal ongoing  -DAVE (r) KR (t) DAVE (c) goal ongoing  -LS    Dynamic Standing Balance PT LTG    Dynamic Standing Balance PT LTG, [place] Level   conditional independence  -LS    Dynamic Standing Balance PT LTG, Assist Device   assistive Device  -LS    Dynamic Standing Balance PT LTG, Date Goal Reviewed   07/09/17   previous goal achieved 7/9  -LS    Dynamic Standing Balance PT LTG, Outcome goal ongoing  -EH goal ongoing  -DAVE (r) KR (t) DAVE (c) goal revised  -LS      07/06/17 1410          Transfer Training PT LTG    Transfer Training PT LTG, Date Established 07/06/17  -SJ      Transfer Training PT LTG, Time to Achieve 2 wks  -SJ      Transfer Training PT LTG, Activity Type bed to chair /chair to bed;sit to stand/stand to sit  -SJ      Transfer Training PT LTG, [place] Level supervision required  -SJ      Transfer Training PT LTG, Assist Device walker, rolling  -SJ      Transfer Training PT LTG, Outcome goal ongoing  -SJ      Gait Training PT LTG    Gait Training Goal PT LTG, Date Established 07/06/17  -SJ      Gait Training Goal PT LTG, Time to Achieve 2 wks  -SJ      Gait Training Goal PT LTG, [place] Level supervision required  -SJ      Gait Training Goal PT LTG, Assist Device walker, rolling  -SJ      Gait Training Goal PT LTG, Distance to Achieve 200  -SJ      Gait Training Goal PT LTG, Outcome goal ongoing  -SJ       Dynamic Standing Balance PT LTG    Dynamic Standing Balance PT LTG, Date Established 07/06/17  -SJ      Dynamic Standing Balance PT LTG, Time to Achieve 2 wks  -SJ      Dynamic Standing Balance PT LTG, [place] Level contact guard assist  -SJ      Dynamic Standing Balance PT LTG, Outcome goal ongoing  -SJ        User Key  (r) = Recorded By, (t) = Taken By, (c) = Cosigned By    Initials Name Provider Type    DAVE Abbi Mason, PT Physical Therapist     Shauna Todd, PT Physical Therapist    SJ Sakshi Gutierrez, PT Physical Therapist    LS Shae Robledo, PT Physical Therapist    MADI Shen, PT Student PT Student          Physical Therapy Education     Title: PT OT SLP Therapies (Active)     Topic: Physical Therapy (Active)     Point: Mobility training (Active)    Learning Progress Summary    Learner Readiness Method Response Comment Documented by Status   Patient Acceptance E NR   07/11/17 1420 Active    Acceptance E NR  KR 07/10/17 1152 Active    Acceptance E,D NR   07/09/17 1207 Active    Acceptance E NR   07/06/17 1412 Active               Point: Home exercise program (Active)    Learning Progress Summary    Learner Readiness Method Response Comment Documented by Status   Patient Acceptance E NR   07/11/17 1420 Active    Acceptance E,D NR   07/09/17 1207 Active               Point: Body mechanics (Active)    Learning Progress Summary    Learner Readiness Method Response Comment Documented by Status   Patient Acceptance E NR   07/11/17 1420 Active    Acceptance E NR  KR 07/10/17 1152 Active    Acceptance E,D NR   07/09/17 1207 Active    Acceptance E NR   07/06/17 1412 Active               Point: Precautions (Active)    Learning Progress Summary    Learner Readiness Method Response Comment Documented by Status   Patient Acceptance E NR   07/11/17 1420 Active    Acceptance E NR  KR 07/10/17 1152 Active    Acceptance E,D NR   07/09/17 1207 Active    Acceptance E NR   07/06/17  1412 Active                      User Key     Initials Effective Dates Name Provider Type Discipline     06/19/15 -  Shauna Todd, PT Physical Therapist PT    SJ 06/19/15 -  Sakshi Gutierrez, PT Physical Therapist PT    LS 06/19/15 -  Shae Robledo, PT Physical Therapist PT    KR 05/30/17 -  Vijaya Shen, PT Student PT Student PT                    PT Recommendation and Plan  Anticipated Discharge Disposition: inpatient rehabilitation facility  Planned Therapy Interventions: balance training, bed mobility training, gait training, home exercise program, patient/family education, strengthening, transfer training  PT Frequency: daily  Plan of Care Review  Plan Of Care Reviewed With: patient  Outcome Summary/Follow up Plan: pt t/f to chair and performs 2 minutes steps in place and standing ther ex. Limited by infection percautions.          Outcome Measures       07/11/17 1304 07/10/17 1055 07/09/17 1129    How much help from another person do you currently need...    Turning from your back to your side while in flat bed without using bedrails? 4  - 4  -DAVE (r) KR (t) DAVE (c)     Moving from lying on back to sitting on the side of a flat bed without bedrails? 3  - 3  -DAVE (r) KR (t) DAVE (c)     Moving to and from a bed to a chair (including a wheelchair)? 3  - 3  -DAVE (r) KR (t) DAVE (c)     Standing up from a chair using your arms (e.g., wheelchair, bedside chair)? 3  - 3  -DAVE (r) KR (t) DAVE (c)     Climbing 3-5 steps with a railing? 2  - 2  -DAVE (r) KR (t) DAVE (c)     To walk in hospital room? 3  - 3  -DAVE (r) KR (t) DAVE (c)     AM-PAC 6 Clicks Score 18  -EH 18  -DAVE (r) KR (t)     How much help from another is currently needed...    Putting on and taking off regular lower body clothing?   1  -CL    Bathing (including washing, rinsing, and drying)   2  -CL    Toileting (which includes using toilet bed pan or urinal)   2  -CL    Putting on and taking off regular upper body clothing   2  -CL    Taking care of  personal grooming (such as brushing teeth)   4  -CL    Eating meals   4  -CL    Score   15  -CL    Functional Assessment    Outcome Measure Options -[facility] 6 Clicks Basic Mobility (PT)  -[facility] 6 Clicks Basic Mobility (PT)  -DAVE (r) KR (t) DAVE (c) AM-PAC 6 Clicks Daily Activity (OT)  -CL      07/09/17 1128          How much help from another person do you currently need...    Turning from your back to your side while in flat bed without using bedrails? 4  -LS      Moving from lying on back to sitting on the side of a flat bed without bedrails? 3  -LS      Moving to and from a bed to a chair (including a wheelchair)? 3  -LS      Standing up from a chair using your arms (e.g., wheelchair, bedside chair)? 3  -LS      Climbing 3-5 steps with a railing? 2  -LS      To walk in hospital room? 3  -LS      AM-PAC 6 Clicks Score 18  -LS      Functional Assessment    Outcome Measure Options AM-[facility] 6 Clicks Basic Mobility (PT)  -LS        User Key  (r) = Recorded By, (t) = Taken By, (c) = Cosigned By    Initials Name Provider Type    DAVE Abbi Mason, PT Physical Therapist     Shauna Todd, PT Physical Therapist    LS Shae Robledo, PT Physical Therapist    CL Basilia Gautam, OT Occupational Therapist    KR Vijaya Shen, PT Student PT Student           Time Calculation:         PT Charges       07/11/17 1422          Time Calculation    Start Time 1304  -      PT Received On 07/11/17  -      PT Goal Re-Cert Due Date 07/21/17  -      Time Calculation- PT    Total Timed Code Minutes- PT 23 minute(s)  -        User Key  (r) = Recorded By, (t) = Taken By, (c) = Cosigned By    Initials Name Provider Type     Shauna Todd, PT Physical Therapist          Therapy Charges for Today     Code Description Service Date Service Provider Modifiers Qty    78724893596 HC GAIT TRAINING EA 15 MIN 7/11/2017 Shauna Todd, PT GP 1    30966117642 HC PT THER PROC EA 15 MIN 7/11/2017 Shauna Todd, PT GP 1           PT G-Codes  Outcome Measure Options: AM-PAC 6 Clicks Basic Mobility (PT)    Shauna Todd, PT  7/11/2017          Electronically signed by Shauna Todd PT at 7/11/2017  2:23 PM           Occupational Therapy Notes (last 24 hours) (Notes from 7/11/2017 10:57 AM through 7/12/2017 10:57 AM)      Brenda Duarte, OT at 7/11/2017  2:33 PM  Version 2 of 2         Problem: Patient Care Overview (Adult)  Goal: Plan of Care Review  Outcome: Ongoing (interventions implemented as appropriate)    07/11/17 1431   Coping/Psychosocial Response Interventions   Plan Of Care Reviewed With patient   Patient Care Overview   Progress improving   Outcome Evaluation   Outcome Summary/Follow up Plan CGA for supine to sit, min A x2 for STS from bed and for bed to chair tsf. Supervision only for LBD of socks. Continue OT POC.         Problem: Inpatient Occupational Therapy  Goal: Bed Mobility Goal LTG- OT  Outcome: Ongoing (interventions implemented as appropriate)    07/06/17 1401 07/11/17 1431   Bed Mobility OT LTG   Bed Mobility OT LTG, Date Established 07/06/17 --    Bed Mobility OT LTG, Time to Achieve 1 wk --    Bed Mobility OT LTG, Activity Type all bed mobility --    Bed Mobility OT LTG, [place] Level conditional independence --    Bed Mobility OT LTG, Outcome --  goal ongoing  (progressing)       Goal: Transfer Training Goal 1 LTG- OT  Outcome: Ongoing (interventions implemented as appropriate)    07/06/17 1401 07/11/17 1431   Transfer Training OT LTG   Transfer Training OT LTG, Date Established 07/06/17 --    Transfer Training OT LTG, Time to Achieve 1 wk --    Transfer Training OT LTG, Activity Type bed to chair /chair to bed --    Transfer Training OT LTG, [place] Level supervision required --    Transfer Training OT LTG, Assist Device other (see comments)  (with appropriate AD) --    Transfer Training OT LTG, Outcome --  goal ongoing  (progressing)       Goal: LB Dressing Goal LTG-  OT  Outcome: Ongoing (interventions implemented as appropriate)    07/06/17 1401 07/11/17 1431   LB Dressing OT LTG   LB Dressing Goal OT LTG, Date Established 07/06/17 --    LB Dressing Goal OT LTG, Time to Achieve 1 wk --    LB Dressing Goal OT LTG, Activity Type alvin/doff B socks --    LB Dressing Goal OT LTG, [place] Level moderate assist (50% patient effort) --    LB Dressing Goal OT LTG, Adaptive Equipment reacher;sock-aid --    LB Dressing Goal OT LTG, Outcome --  goal partially met  (supervision for socks)              Electronically signed by Brenda Duarte OT at 7/11/2017  2:34 PM      Brenda Duarte OT at 7/11/2017  2:33 PM  Version 1 of 2         Problem: Patient Care Overview (Adult)  Goal: Plan of Care Review  Outcome: Ongoing (interventions implemented as appropriate)    07/11/17 1431   Coping/Psychosocial Response Interventions   Plan Of Care Reviewed With patient   Patient Care Overview   Progress improving   Outcome Evaluation   Outcome Summary/Follow up Plan Supervision for supine to sit, min A x2 for STS from bed and for bed to chair tsf. Supervision only for LBD of socks. Continue OT POC.         Problem: Inpatient Occupational Therapy  Goal: Bed Mobility Goal LTG- OT  Outcome: Ongoing (interventions implemented as appropriate)    07/06/17 1401 07/11/17 1431   Bed Mobility OT LTG   Bed Mobility OT LTG, Date Established 07/06/17 --    Bed Mobility OT LTG, Time to Achieve 1 wk --    Bed Mobility OT LTG, Activity Type all bed mobility --    Bed Mobility OT LTG, [place] Level conditional independence --    Bed Mobility OT LTG, Outcome --  goal ongoing  (progressing)       Goal: Transfer Training Goal 1 LTG- OT  Outcome: Ongoing (interventions implemented as appropriate)    07/06/17 1401 07/11/17 1431   Transfer Training OT LTG   Transfer Training OT LTG, Date Established 07/06/17 --    Transfer Training OT LTG, Time to Achieve 1 wk --    Transfer Training OT LTG, Activity  Type bed to chair /chair to bed --    Transfer Training OT LTG, [place] Level supervision required --    Transfer Training OT LTG, Assist Device other (see comments)  (with appropriate AD) --    Transfer Training OT LTG, Outcome --  goal ongoing  (progressing)       Goal: LB Dressing Goal LTG- OT  Outcome: Ongoing (interventions implemented as appropriate)    07/06/17 1401 07/11/17 1431   LB Dressing OT LTG   LB Dressing Goal OT LTG, Date Established 07/06/17 --    LB Dressing Goal OT LTG, Time to Achieve 1 wk --    LB Dressing Goal OT LTG, Activity Type alvin/doff B socks --    LB Dressing Goal OT LTG, [place] Level moderate assist (50% patient effort) --    LB Dressing Goal OT LTG, Adaptive Equipment reacher;sock-aid --    LB Dressing Goal OT LTG, Outcome --  goal partially met  (supervision for socks)              Electronically signed by Brenda Duarte OT at 7/11/2017  2:33 PM

## 2017-07-12 NOTE — PLAN OF CARE
Problem: Patient Care Overview (Adult)  Goal: Plan of Care Review  Outcome: Ongoing (interventions implemented as appropriate)    07/12/17 1415   Coping/Psychosocial Response Interventions   Plan Of Care Reviewed With patient   Outcome Evaluation   Outcome Summary/Follow up Plan Pt ambulates in [facility] x 90 ft with 2 standing rest breaks using RWx. Pt has improved control of RWx during turns this date.         Problem: Inpatient Physical Therapy  Goal: Transfer Training Goal 1 LTG- PT  Outcome: Ongoing (interventions implemented as appropriate)    07/06/17 1410 07/12/17 1415   Transfer Training PT LTG   Transfer Training PT LTG, Date Established 07/06/17 --    Transfer Training PT LTG, Time to Achieve 2 wks --    Transfer Training PT LTG, Activity Type bed to chair /chair to bed;sit to stand/stand to sit --    Transfer Training PT LTG, [place] Level supervision required --    Transfer Training PT LTG, Assist Device walker, rolling --    Transfer Training PT LTG, Outcome --  goal ongoing       Goal: Gait Training Goal LTG- PT  Outcome: Ongoing (interventions implemented as appropriate)    07/06/17 1410 07/12/17 1415   Gait Training PT LTG   Gait Training Goal PT LTG, Date Established 07/06/17 --    Gait Training Goal PT LTG, Time to Achieve 2 wks --    Gait Training Goal PT LTG, [place] Level supervision required --    Gait Training Goal PT LTG, Assist Device walker, rolling --    Gait Training Goal PT LTG, Distance to Achieve 200 --    Gait Training Goal PT LTG, Outcome --  goal ongoing       Goal: Dynamic Standing Balance Goal LTG- PT  Outcome: Ongoing (interventions implemented as appropriate)    07/06/17 1410 07/09/17 1207 07/11/17 1420   Dynamic Standing Balance PT LTG   Dynamic Standing Balance PT LTG, Date Established 07/06/17 --  --    Dynamic Standing Balance PT LTG, Time to Achieve 2 wks --  --    Dynamic Standing Balance PT LTG, [place] Level --  conditional independence --    Dynamic Standing  Balance PT LTG, Assist Device --  assistive Device --    Dynamic Standing Balance PT LTG, Date Goal Reviewed --  07/09/17  (previous goal achieved 7/9) --    Dynamic Standing Balance PT LTG, Outcome --  --  goal ongoing

## 2017-07-12 NOTE — THERAPY TREATMENT NOTE
Acute Care - Physical Therapy Treatment Note  [facility]     Patient Name: Luis F Plummer  : 1945  MRN: [id number]  Today's Date: 2017  Onset of Illness/Injury or Date of Surgery Date: 17  Date of Referral to PT: 17  Referring Physician: FRANCO Irizarry    Admit Date: 2017    Visit Dx:    ICD-10-CM ICD-9-CM   1. Peripheral edema R60.9 782.3   2. Pleural effusion J90 511.9   3. Acute pulmonary edema J81.0 518.4   4. Acute on chronic systolic congestive heart failure I50.23 428.23     428.0   5. Hyperlipidemia LDL goal <70 E78.5 272.4   6. Acute on chronic systolic heart failure I50.23 428.23   7. Coronary artery disease of native artery of native heart with stable angina pectoris I25.118 414.01     413.9   8. Impaired mobility and ADLs Z74.09 799.89   9. Impaired functional mobility, balance, gait, and endurance Z74.09 V49.89     Patient Active Problem List   Diagnosis   • Peripheral edema   • Acute on chronic systolic heart failure   • Coronary artery disease of native artery of native heart with stable angina pectoris   • Essential hypertension   • Hyperlipidemia LDL goal <70   • DM (diabetes mellitus)   • Bilateral leg ulcer   • CKD (chronic kidney disease)   • Tick bite   • Peripheral neuropathy   • Cellulitis-legs/feet with chronic ulcer- no osteomyelitis on MRI   • Ischemic cardiomyopathy   • Noncompliance               Adult Rehabilitation Note       17 1300 17 1001 17 1305    Rehab Assessment/Intervention    Discipline --  - physical therapist  -EH occupational therapist  -MM    Document Type --  - therapy note (daily note)  - therapy note (daily note)   partial co-tx with PT  -MM    Subjective Information --  - agree to therapy;no complaints  - agree to therapy;no complaints  -MM    Patient Effort, Rehab Treatment --  - adequate  - good  -MM    Symptoms Noted During/After Treatment --  -  none  -MM    Symptoms Noted Comment  increased foot  pain  -EH     Precautions/Limitations --  -  fall precautions;other (see comments)   MRSA, bed bugs (unclear whether restrictions are lifted)  -MM    Recorded by [] Shauna Todd, PT [EH] Shauna Todd, PT [MM] Brenda Duarte, OT    Vital Signs    Pre Systolic BP Rehab   --   Vitals stable throughout session  -MM    Pre SpO2 (%)  95  -EH     O2 Delivery Pre Treatment  room air  -EH     Post SpO2 (%)  96  -EH     O2 Delivery Post Treatment  room air  -EH     Recorded by  [EH] Shauna Todd, PT [MM] Brenda Duarte, OT    Pain Assessment    Pain Assessment --  - 0-10  -EH Ansari-Baker FACES  -MM    Ansari-Simpson FACES Pain Rating   4  -MM    Pain Score --  - 8  -EH     Post Pain Score --  - 8  -EH     Pain Location --  -EH Foot  -EH Chest  -MM    Pain Orientation --  - Left;Right  - Left  -MM    Pain Intervention(s) --  -EH Repositioned;Ambulation/increased activity  - Repositioned;Ambulation/increased activity;Other (Comment)   Notified RN  -MM    Response to Interventions --  - pt states it worsens during weight bearing through LEs  -EH appears to have improved by end of session  -MM    Recorded by [] Shauna Todd, PT [EH] Shauna Todd, PT [MM] Brenda Duarte, OT    Cognitive Assessment/Intervention    Current Cognitive/Communication Assessment --  - functional  - functional  -MM    Orientation Status --  - oriented x 4  -EH oriented x 4  -MM    Follows Commands/Answers Questions --  - 100% of the time;able to follow single-step instructions  - 100% of the time;able to follow single-step instructions;needs cueing;needs increased time;needs repetition  -MM    Personal Safety --  - decreased awareness, need for assist;decreased awareness, need for safety  - decreased awareness, need for assist;decreased awareness, need for safety;decreased insight to deficits  -MM    Personal Safety Interventions --  - fall prevention program  maintained;gait belt  - fall prevention program maintained;gait belt;nonskid shoes/slippers when out of bed  -MM    Recorded by [] Shauna Todd, PT [] Shauna Todd, PT [MM] Brenda Duarte, OT    Bed Mobility, Assessment/Treatment    Bed Mob, Supine to Sit, [place] --  - minimum assist (75% patient effort)  - contact guard assist  -    Bed Mob, Sit to Supine, [place] --  - not tested  -     Bed Mobility, Comment   Increased time and effort, vc for sequencing  -MM    Recorded by [] Shauna Todd, PT [EH] Shauna Todd, PT [MM] Brenda Duarte, OT    Transfer Assessment/Treatment    Transfers, Bed-Chair [place] --  -  minimum assist (75% patient effort);2 person assist required  -MM    Transfers, Bed-Chair-Bed, Assist Device --  -  rolling walker  -MM    Transfers, Sit-Stand [place] --  - contact guard assist;verbal cues required   VC for safe HP  - minimum assist (75% patient effort);2 person assist required;verbal cues required  -MM    Transfers, Stand-Sit [place] --  - contact guard assist  - contact guard assist;2 person assist required  -MM    Transfers, Sit-Stand-Sit, Assist Device --  - rolling walker  - rolling walker  -MM    Transfer, Comment   VC for hand placement and sequencing;progressed to CGA x2 for second STS attempt (from chair)  -MM    Recorded by [] Shauna Todd, PT [EH] Shauna Todd, PT [MM] Brenda Duarte, OT    Gait Assessment/Treatment    Gait, [place] Level --  - contact guard assist  -     Gait, Assistive Device --  - rolling walker  -     Gait, Distance (Feet) --  - 90  -     Gait, Gait Pattern Analysis --  - swing-through gait  -     Gait, Gait Deviations --  - decreased heel strike;step length decreased;toe-to-floor clearance decreased;forward flexed posture  -     Gait, Safety Issues --  - weight-shifting ability decreased;step length  decreased;sequencing ability decreased  -EH     Gait, Impairments --  -EH coordination impaired  -EH     Gait, Comment  2 standing rest breaks; VC for improved posture.   -EH     Recorded by [EH] Shauna Todd, PT [EH] Shauna Todd, PT     Functional Mobility    Functional Mobility- Ind. Level   minimum assist (75% patient effort);verbal cues required;nonverbal cues required (demo/gesture)  -MM    Functional Mobility- Device   rolling walker  -MM    Functional Mobility- Comment   VC for directionality/turns and sequencing, min A Rwx management  -MM    Recorded by   [MM] Brenda Duarte OT    Lower Body Dressing Assessment/Training    LB Dressing Assess/Train, Clothing Type   donning:;slipper socks  -MM    LB Dressing Assess/Train, Position   sitting  -MM    LB Dressing Assess/Train, [place]   supervision required  -MM    Recorded by   [MM] Brenda Duarte OT    Toileting Assessment/Training    Toileting Assess/Train, Assistive Device   urinal  -MM    Toileting Assess/Train, Position   supine  -MM    Toileting Assess/Train, Indepen Level   moderate assist (50% patient effort)  -MM    Toileting Assess/Train, Comment   Pt used urinal in supine; moderate spillage  -MM    Recorded by   [MM] Brenda Duarte OT    Grooming Assessment/Training    Grooming Assess/Train, Position   sitting  -MM    Grooming Assess/Train, Indepen Level   set up required;verbal cues required  -MM    Grooming Assess/Train, Comment   Wipe/dry hands after urinal use.   -MM    Recorded by   [MM] Brenda Duarte OT    Therapy Exercises    Right Lower Extremity --  -      Bilateral Lower Extremities --  -EH 15 reps;ankle pumps/circles;LAQ;SLR;hip IR;hip ER;glut sets  -EH     Bilateral Upper Extremity   AROM:;15 reps;elbow flexion/extension;hand pumps;shoulder extension/flexion;shoulder horizontal abd/add  -MM    Recorded by [EH] Shauna Todd, PT [EH] Shauna Todd, PT [MM] Brenda Villatoro  Felicia, OT    Positioning and Restraints    Pre-Treatment Position --  -EH in bed  -EH in bed  -MM    Post Treatment Position --  -EH chair  -EH chair  -MM    In Chair --  -EH notified nsg;reclined;call light within reach;encouraged to call for assist;exit alarm on  -EH notified nsg;reclined;call light within reach;encouraged to call for assist;exit alarm on  -MM    Recorded by [EH] Shauna Todd, PT [EH] Shauna Todd, PT [MM] Brenda Duarte, OT      07/11/17 1304 07/10/17 1055       Rehab Assessment/Intervention    Discipline physical therapist  - physical therapist  -DAVEKR,JB2     Document Type therapy note (daily note)  - therapy note (daily note)  -DAVEKR,JB2     Subjective Information agree to therapy;no complaints  - agree to therapy;no complaints  -DAVE,KR,JB2     Patient Effort, Rehab Treatment good  - good  -DAVE,KR,JB2     Symptoms Noted During/After Treatment none  - fatigue  -DAVE,KR,JB2     Precautions/Limitations fall precautions  - fall precautions  -DAVE,KR,JB2     Recorded by [EH] Shauna Todd, PT [DAVE,KR,JB2] Abbi Mason, PT (r) Vijaya Shen, PT Student (t) Abbi Mason, PT (c)     Vital Signs    Pre Systolic BP Rehab  119  -DAVE,KR,JB2     Pre Treatment Diastolic BP  80  -DAVE,KR,JB2     Post Systolic BP Rehab  115  -DAVE,KR,JB2     Post Treatment Diastolic BP  74  -DAVE,KR,JB2     Pretreatment Heart Rate (beats/min)  73  -DAVE,KR,JB2     Posttreatment Heart Rate (beats/min)  73  -DAVE,KR,JB2     Pre SpO2 (%)  99  -DAVE,KR,JB2     O2 Delivery Pre Treatment  supplemental O2  -DAVE,KR,JB2     Post SpO2 (%)  99  -DAVE,KR,JB2     O2 Delivery Post Treatment  supplemental O2  -DAVE,KR,JB2     Pre Patient Position  Supine  -DAVE,KR,JB2     Intra Patient Position  Standing  -DAVE,KR,JB2     Post Patient Position  Sitting  -DAVE,KR,JB2     Recorded by  [DAVE,KR,JB2] Abbi Mason, PT (r) Vijaya Shen, PT Student (t) Abbi Mason, PT (c)     Pain Assessment    Pain Assessment  Ansari-Baker FACES  - 0-10  -DAVE,KR,JB2     Ansari-Simpson FACES Pain Rating 4  -      Pain Score  0  -DAVE,KR,JB2     Post Pain Score  0  -DAVE,KR,JB2     Pain Intervention(s) Repositioned;Ambulation/increased activity  -      Response to Interventions appears to have improved.  -      Recorded by [] Shauna Todd, PT [DAVE,KR,JB2] Abbi Mason, PT (r) Vijaya Shen, PT Student (t) Abbi Mason, PT (c)     Cognitive Assessment/Intervention    Current Cognitive/Communication Assessment functional  - functional  -DAVE,KR,JB2     Orientation Status oriented x 4  - oriented to;person;place  -DAVE,KR,JB2     Follows Commands/Answers Questions 100% of the time;able to follow single-step instructions;needs cueing;needs increased time;needs repetition  - able to follow single-step instructions;100% of the time;needs cueing;needs increased time;needs repetition  -DAVE,KR,JB2     Personal Safety decreased awareness, need for assist;decreased awareness, need for safety;mild impairment  - mild impairment;decreased awareness, need for assist;decreased awareness, need for safety;decreased insight to deficits  -DAVE,KR,JB2     Personal Safety Interventions fall prevention program maintained;gait belt;nonskid shoes/slippers when out of bed  - fall prevention program maintained;gait belt;nonskid shoes/slippers when out of bed  -DAVE,KR,JB2     Recorded by [] Shauna Todd, PT [DAVE,KR,JB2] Abbi Mason, PT (r) Vijaya Shen, PT Student (t) Abbi Mason, PT (c)     Bed Mobility, Assessment/Treatment    Bed Mob, Supine to Sit, [place] contact guard assist  - supervision required  -DAVE,KR,JB2     Bed Mobility, Comment Increased time needed; VC for squencing.  - VC's for sequencing  -DAVE,KR,JB2     Recorded by [] Shauna Todd, PT [DAVE,KR,JB2] Abbi Mason, PT (r) Vijaya Shne, PT Student (t) Abbi Mason PT (c)     Transfer Assessment/Treatment    Transfers, Bed-Chair [place]  minimum assist (75% patient effort);2 person assist required  -EH      Transfers, Bed-Chair-Bed, Assist Device rolling walker  -EH      Transfers, Sit-Stand [place] minimum assist (75% patient effort);2 person assist required   from bed; CGA x 2 from chair  -EH minimum assist (75% patient effort);verbal cues required  -DAVE,KR,JB2     Transfers, Stand-Sit [place] contact guard assist;2 person assist required  -EH minimum assist (75% patient effort);verbal cues required  -DAVE,KR,JB2     Transfers, Sit-Stand-Sit, Assist Device rolling walker  -EH      Transfer, Impairments strength decreased;coordination impaired;impaired balance  -EH strength decreased;impaired balance  -DAVE,KR,JB2     Transfer, Comment VC given for hand placement, movement of walker, sequencing of turn. Assist needed for control of RWx to turn  - Pt given VC's for hand placement.   -DAVE,KR,JB2     Recorded by [EH] Shauna Todd, PT [DAVE,KR,JB2] Abbi Mason, PT (r) Vijaya Shen PT Student (t) Abbi Mason, PT (c)     Gait Assessment/Treatment    Gait, [place] Level contact guard assist;verbal cues required  - contact guard assist;verbal cues required  -DAVE,KR,JB2     Gait, Assistive Device rolling walker  - rolling walker  -DAVE,KR,JB2     Gait, Distance (Feet) 5  -  -DAVE,KR,JB2     Gait, Gait Pattern Analysis swing-to gait  - swing-to gait  -DAVE,KR,JB2     Gait, Gait Deviations decreased heel strike;step length decreased  - an[place] decreased;forward flexed posture;step length decreased;weight-shifting ability decreased  -DAVE,KR,JB2     Gait, Safety Issues step length decreased;balance decreased during turns;weight-shifting ability decreased  - step length decreased;balance decreased during turns;supplemental O2  -DAVE,KR,JB2     Gait, Impairments coordination impaired  -EH strength decreased;impaired balance  -DVAE,KR,JB2     Gait, Comment Additional 2 minutes high steps in place and standing ther ex.   - VC's  for upright posture. Pt's gait slow and labored. 2 standing rest breaks.  -DAVE,KR,JB2     Recorded by [EH] Shauna Todd PT [DAVE,KR,JB2] Abbi Mason PT (r) Vijaya Shen PT Student (t) Abbi Mason PT (c)     Motor Skills/Interventions    Additional Documentation  Balance Skills Training (Group)  -DAVE,KR,JB2     Recorded by  [DAVE,KR,JB2] Abbi Mason PT (r) Vijaya Shen PT Student (t) Abbi Mason PT (c)     Balance Skills Training    Sitting-Level of Assistance  Distant supervision  -DAVE,KR,JB2     Sitting-Balance Support  Right upper extremity supported;Left upper extremity supported;Feet supported  -DAVE,KR,JB2     Standing-Level of Assistance  Contact guard  -DAVE,KR,JB2     Static Standing Balance Support  assistive device  -DAVE,KR,JB2     Recorded by  [DAVE,KR,JB2] Abbi Mason PT (r) Vijaya Shen, PT Student (t) Abbi Mason PT (c)     Therapy Exercises    Bilateral Lower Extremities AROM:;standing;hip flexion;hip extension;hip abduction/adduction  -EH      Recorded by [EH] Shauna Todd PT      Positioning and Restraints    Pre-Treatment Position in bed  - in bed  -DAVE,KR,JB2     Post Treatment Position chair  - chair  -DAVE,KR,JB2     In Chair reclined;notified nsg;call light within reach;encouraged to call for assist;exit alarm on  - reclined;call light within reach;encouraged to call for assist;exit alarm on  -DAVE,KR,JB2     Recorded by [EH] Shauna Todd, PT [DAVE,KR,JB2] Abbi Mason, LEXI (r) Vijaya Shen, PT Student (t) Abbi Mason PT (c)       User Key  (r) = Recorded By, (t) = Taken By, (c) = Cosigned By    Initials Name Effective Dates    DAVE Mason, PT 06/19/15 -     LM Todd, PT 06/19/15 -      Brenda Duarte, OT 06/22/15 -     MADI Shen PT Student 05/30/17 -                 IP PT Goals       07/12/17 1415 07/11/17 1420 07/10/17 1152    Transfer Training PT LTG    Transfer Training PT LTG,  Outcome goal ongoing  -EH goal ongoing  -EH goal ongoing  -DAVE (r) KR (t) DAVE (c)    Gait Training PT LTG    Gait Training Goal PT LTG, Outcome goal ongoing  - goal ongoing  - goal ongoing  -DAVE (r) KR (t) DAVE (c)    Dynamic Standing Balance PT LTG    Dynamic Standing Balance PT LTG, Outcome  goal ongoing  - goal ongoing  -DAVE (r) KR (t) DAVE (c)      07/09/17 1207 07/06/17 1410       Transfer Training PT LTG    Transfer Training PT LTG, Date Established  07/06/17  -SJ     Transfer Training PT LTG, Time to Achieve  2 wks  -SJ     Transfer Training PT LTG, Activity Type  bed to chair /chair to bed;sit to stand/stand to sit  -SJ     Transfer Training PT LTG, [place] Level  supervision required  -SJ     Transfer Training PT LTG, Assist Device  walker, rolling  -SJ     Transfer Training PT LTG, Outcome  goal ongoing  -SJ     Gait Training PT LTG    Gait Training Goal PT LTG, Date Established  07/06/17  -SJ     Gait Training Goal PT LTG, Time to Achieve  2 wks  -SJ     Gait Training Goal PT LTG, [place] Level  supervision required  -SJ     Gait Training Goal PT LTG, Assist Device  walker, rolling  -SJ     Gait Training Goal PT LTG, Distance to Achieve  200  -SJ     Gait Training Goal PT LTG, Outcome goal ongoing  -LS goal ongoing  -SJ     Dynamic Standing Balance PT LTG    Dynamic Standing Balance PT LTG, Date Established  07/06/17  -SJ     Dynamic Standing Balance PT LTG, Time to Achieve  2 wks  -SJ     Dynamic Standing Balance PT LTG, [place] Level conditional independence  -LS contact guard assist  -SJ     Dynamic Standing Balance PT LTG, Assist Device assistive Device  -LS      Dynamic Standing Balance PT LTG, Date Goal Reviewed 07/09/17   previous goal achieved 7/9  -LS      Dynamic Standing Balance PT LTG, Outcome goal revised  -LS goal ongoing  -SJ       User Key  (r) = Recorded By, (t) = Taken By, (c) = Cosigned By    Initials Name Provider Type    DAVE Mason, PT Physical Therapist    EH  Shauna Todd, PT Physical Therapist     Sakshi Gutierrez, PT Physical Therapist    ZACHARY Robledo, PT Physical Therapist    KR Vijaya Shen, PT Student PT Student          Physical Therapy Education     Title: PT OT SLP Therapies (Done)     Topic: Physical Therapy (Done)     Point: Mobility training (Done)    Learning Progress Summary    Learner Readiness Method Response Comment Documented by Status   Patient Acceptance E VU,NR   07/12/17 1414 Done    Acceptance E NR   07/11/17 1420 Active    Acceptance E NR   07/10/17 1152 Active    Acceptance E,D NR   07/09/17 1207 Active    Acceptance E NR   07/06/17 1412 Active               Point: Home exercise program (Done)    Learning Progress Summary    Learner Readiness Method Response Comment Documented by Status   Patient Acceptance E VU,NR   07/12/17 1414 Done    Acceptance E NR   07/11/17 1420 Active    Acceptance E,D NR   07/09/17 1207 Active               Point: Body mechanics (Done)    Learning Progress Summary    Learner Readiness Method Response Comment Documented by Status   Patient Acceptance E VU,NR   07/12/17 1414 Done    Acceptance E [facility] 07/11/17 1420 Active    Acceptance E NR   07/10/17 1152 Active    Acceptance E,D NR   07/09/17 1207 Active    Acceptance E NR   07/06/17 1412 Active               Point: Precautions (Done)    Learning Progress Summary    Learner Readiness Method Response Comment Documented by Status   Patient Acceptance E VU,NR   07/12/17 1414 Done    Acceptance E NR   07/11/17 1420 Active    Acceptance E NR   07/10/17 1152 Active    Acceptance E,D NR   07/09/17 1207 Active    Acceptance E NR   07/06/17 1412 Active                      User Key     Initials Effective Dates Name Provider Type Discipline     06/19/15 -  Shauna Todd, PT Physical Therapist PT     06/19/15 -  Sakshi Gutierrez, PT Physical Therapist PT     06/19/15 -  Shae Robledo, PT Physical Therapist PT     05/30/17  -  Vijaya Shen, PT Student PT Student PT                    PT Recommendation and Plan  Anticipated Discharge Disposition: inpatient rehabilitation facility  Planned Therapy Interventions: balance training, bed mobility training, gait training, home exercise program, patient/family education, strengthening, transfer training  PT Frequency: daily  Plan of Care Review  Plan Of Care Reviewed With: patient  Outcome Summary/Follow up Plan: Pt ambulates in krishnan x 90 ft with 2 standing rest breaks using RWx. Pt has improved control of RWx during turns this date.          Outcome Measures       07/11/17 1305 07/11/17 1304 07/10/17 1055    How much help from another person do you currently need...    Turning from your back to your side while in flat bed without using bedrails?  4  -EH 4  -DAVE (r) KR (t) DAVE (c)    Moving from lying on back to sitting on the side of a flat bed without bedrails?  3  -EH 3  -DAVE (r) KR (t) DAVE (c)    Moving to and from a bed to a chair (including a wheelchair)?  3  -EH 3  -DAVE (r) KR (t) DAVE (c)    Standing up from a chair using your arms (e.g., wheelchair, bedside chair)?  3  -EH 3  -DAVE (r) KR (t) DAVE (c)    Climbing 3-5 steps with a railing?  2  -EH 2  -DAVE (r) KR (t) DAVE (c)    To walk in hospital room?  3  -EH 3  -DAVE (r) KR (t) DAVE (c)    AM-PAC 6 Clicks Score  18  -EH 18  -DAVE (r) KR (t)    How much help from another is currently needed...    Putting on and taking off regular lower body clothing? 2  -MM      Bathing (including washing, rinsing, and drying) 2  -MM      Toileting (which includes using toilet bed pan or urinal) 2  -MM      Putting on and taking off regular upper body clothing 3  -MM      Taking care of personal grooming (such as brushing teeth) 4  -MM      Eating meals 4  -MM      Score 17  -MM      Functional Assessment    Outcome Measure Options  AM-PAC 6 Clicks Basic Mobility (PT)  -EH AM-PAC 6 Clicks Basic Mobility (PT)  -DAVE (r) KR (t) DAVE (c)      User Key  (r) = Recorded By, (t)  = Taken By, (c) = Cosigned By    Initials Name Provider Type    DAVE Abbi Mason, PT Physical Therapist    EH Shauna Todd, PT Physical Therapist    MM Brenda Duarte, OT Occupational Therapist    MADI Shen, PT Student PT Student           Time Calculation:         PT Charges       07/12/17 1418          Time Calculation    Start Time 1001  -EH      PT Received On 07/12/17  -      PT Goal Re-Cert Due Date 07/21/17  -      Time Calculation- PT    Total Timed Code Minutes- PT 40 minute(s)  -        User Key  (r) = Recorded By, (t) = Taken By, (c) = Cosigned By    Initials Name Provider Type     Shauna oTdd, PT Physical Therapist          Therapy Charges for Today     Code Description Service Date Service Provider Modifiers Qty    86964025462 HC GAIT TRAINING EA 15 MIN 7/11/2017 Shauna Todd, PT GP 1    30824442232 HC PT THER PROC EA 15 MIN 7/11/2017 Shauna Todd, PT GP 1    46589449843 HC GAIT TRAINING EA 15 MIN 7/12/2017 Shauna Todd, PT GP 1    71721153869 HC PT THER PROC EA 15 MIN 7/12/2017 Shauna Todd, PT GP 2          PT G-Codes  Outcome Measure Options: AM-PAC 6 Clicks Basic Mobility (PT)    Shauna Todd, PT  7/12/2017

## 2017-07-12 NOTE — PLAN OF CARE
"Problem: Patient Care Overview (Adult)  Goal: Plan of Care Review  Outcome: Ongoing (interventions implemented as appropriate)    07/12/17 0444   Coping/Psychosocial Response Interventions   Plan Of Care Reviewed With patient   Patient Care Overview   Progress no change   Outcome Evaluation   Outcome Summary/Follow up Plan Pt with no acute overnight events. Still complains of chest pain \"off and on, but it's better\".         Problem: Fall Risk (Adult)  Goal: Identify Related Risk Factors and Signs and Symptoms  Outcome: Ongoing (interventions implemented as appropriate)  Goal: Absence of Falls  Outcome: Ongoing (interventions implemented as appropriate)    Problem: Skin Integrity Impairment, Risk/Actual (Adult)  Goal: Identify Related Risk Factors and Signs and Symptoms  Outcome: Ongoing (interventions implemented as appropriate)    Problem: Cardiac Output, Decreased (Adult)  Goal: Identify Related Risk Factors and Signs and Symptoms  Outcome: Ongoing (interventions implemented as appropriate)  Goal: Adequate Cardiac Output/Effective Tissue Perfusion  Outcome: Ongoing (interventions implemented as appropriate)      "

## 2017-07-12 NOTE — PROGRESS NOTES
[facility] at [facility]  IP Progress Note      Chief Complaint/Reason for service:  Ischemic cardiomyopathy with severe heart failure    Subjective: The patient still complaining of chest pain but it's very atypical it's been there for 48 hours and is clearly nonischemic.  He looks frail but he states his breathing is good    Past medical, surgical, social and family history reviewed in the patient's electronic medical record.         Vital Sign Min/Max for last 24 hours  Temp  Min: 97.9 °F (36.6 °C)  Max: 98.2 °F (36.8 °C)   BP  Min: 104/46  Max: 148/94   Pulse  Min: 67  Max: 82   Resp  Min: 14  Max: 20   SpO2  Min: 97 %  Max: 97 %   No Data Recorded      Intake/Output Summary (Last 24 hours) at 07/12/17 0754  Last data filed at 07/12/17 0625   Gross per 24 hour   Intake             1500 ml   Output             5200 ml   Net            -3700 ml             Current Facility-Administered Medications:   •  acetaminophen (TYLENOL) tablet 650 mg, 650 mg, Oral, Q4H PRN, Gris Juan C V, APRN, 650 mg at 07/11/17 2130  •  aspirin EC tablet 81 mg, 81 mg, Oral, Daily, Maryanoksana Alonso, APRN, 81 mg at 07/11/17 1043  •  atorvastatin (LIPITOR) tablet 20 mg, 20 mg, Oral, Nightly, Maryan Alonso, APRN, 20 mg at 07/11/17 2124  •  bumetanide (BUMEX) 10 mg in sodium chloride 0.9 % 100 mL (0.1 mg/mL) infusion, 1 mg/hr, Intravenous, Continuous, Jimmy Prescott MD, Last Rate: 10 mL/hr at 07/11/17 2130, 1 mg/hr at 07/11/17 2130  •  calcium carbonate (TUMS) chewable tablet 500 mg (200 mg elemental), 2 tablet, Oral, BID PRN, Gris Juan C V, APRN, 2 tablet at 07/08/17 0901  •  carvedilol (COREG) tablet 3.125 mg, 3.125 mg, Oral, Q12H, Maryan Alonso, APRN, 3.125 mg at 07/11/17 2124  •  dextrose (D50W) solution 25 g, 25 g, Intravenous, Q15 Min PRN, Phonekeo Khamvanthong, MD  •  dextrose (GLUTOSE) oral gel 15 g, 15 g, Oral, Q15 Min PRN, Monet Kent MD  •  doxycycline (VIBRAMYCIN) capsule 100  mg, 100 mg, Oral, Q12H, Cy Banerjee [facility], 100 mg at 07/11/17 2126  •  glucagon (GLUCAGEN) injection 1 mg, 1 mg, Subcutaneous, Q15 Min PRN, Monet Kent MD  •  heparin (porcine) 5000 UNIT/ML injection 5,000 Units, 5,000 Units, Subcutaneous, Q12H, Grissa Irizarry V, APRN, 5,000 Units at 07/11/17 2124  •  insulin detemir (LEVEMIR) injection 7 Units, 7 Units, Subcutaneous, Q12H, Monet Kent MD, 7 Units at 07/11/17 2132  •  insulin lispro (humaLOG) injection 0-7 Units, 0-7 Units, Subcutaneous, 4x Daily AC & at Bedtime, Monet Kent MD, 2 Units at 07/11/17 1748  •  levothyroxine (SYNTHROID, LEVOTHROID) tablet 50 mcg, 50 mcg, Oral, Q AM, Monet Kent MD, 50 mcg at 07/12/17 0524  •  losartan (COZAAR) tablet 50 mg, 50 mg, Oral, Daily, Gris Juan C V, APRN, 50 mg at 07/11/17 1042  •  nitroglycerin (NITROSTAT) SL tablet 0.4 mg, 0.4 mg, Sublingual, Q5 Min PRN, Gris Juan C V, APRN, 0.4 mg at 07/09/17 2036  •  nystatin (MYCOSTATIN) powder, , Topical, Q12H, Monet Kent MD  •  pantoprazole (PROTONIX) EC tablet 40 mg, 40 mg, Oral, Q AM, Monet Kent MD, 40 mg at 07/12/17 0523  •  Pharmacy Consult - [facility], , Does not apply, Daily, Cy Banerjee RPH  •  sodium chloride 0.9 % flush 1-10 mL, 1-10 mL, Intravenous, PRN, Gris Juan C V, APRN    Physical Exam: General  thin white male who looks frail but no acute distress still has a little tachypnea        HEENT: No JVP is present there is no scleral icterus       Respiratory:  Equal bilateral symmetrical expansion with few crackles on the right lung base which are improved also has decreased breath sounds on the left with E to a changes consistent with effusion       Cardiovascular: Regular rate and rhythm without gallop or murmur and ES 2+ edema which is decreased from yesterday       GI:        Lower Extremities: He has 2+ edema but it's decreased from yesterday he has mild erythema of his legs which appears to  be chronic       Neuro: Cranial nerves II through XII are grossly normal he moves all 4 extremities       Skin:  Warm and dry 2+ pitting edema to palpation       Psych: Flat affect he is hard of hearing    Results Review: I reviewed the patient's data which showed an excellent diuretic response with a net loss of 14 L since admission.  His potassium was low yesterday at 4.1 but his BNP has gone down with stable renal function and laboratory data has not been collected this morning    Radiology Results:  Imaging Results (last 72 hours)     Procedure Component Value Units Date/Time    XR Chest 1 View [[id number]]  (Abnormal) Collected:  07/09/17 2027     Updated:  07/09/17 2127    Narrative:       EXAM:    XR Chest, 1 View    CLINICAL HISTORY:    71 years old, male; Hyperlipidemia, unspecified; Atherosclerotic heart   disease of native coronary artery with other forms of angina pectoris; Acute on   chronic systolic (congestive) heart failure; Acute on chronic systolic   (congestive) heart failure; Acute pulmonary edema; Pleural effusion, not   elsewhere classified; Edema, unspecified; Other reduced mobility; Other reduced   mobility; Pain; Chest pain; Radiating; Prior surgery; Surgery date: 6+ months;   Surgery type: Cabg; Patient HX: Chf, rapid response, pt having chest pain   radiates to left side, cabg 2 years ago; Additional info: Rrt    TECHNIQUE:    Frontal view of the chest.    EXAM DATE/TIME:    7/9/2017 8:27 PM    COMPARISON:    CR - XR CHEST 1 VW 7/5/2017 9:26:15 PM    FINDINGS:    Lungs: Streaky left lower lung opacity may represent atelectasis and/or   airspace disease. Left pleural effusion suspected.    Pleural space:  Unremarkable.  No pneumothorax.    Heart: Cardiomegaly and mild vascular congestion.    Mediastinum:  Unremarkable.    Bones/joints:  Unremarkable.    Other findings: Median sternotomy.      Impression:       Cardiomegaly and mild vascular congestion, with suspicion for left pleural    effusion.  Streaky left lower lung atelectasis and/or airspace disease.  Median sternotomy    THIS DOCUMENT HAS BEEN ELECTRONICALLY SIGNED BY DRAKE HERNDON MD          EKG:     ECHO: Severe left ventricular dysfunction with EF 20% and moderate MR    Tele:      Assessment/Plan: 1 ischemic cardiomyopathy status post prior bypass surgery also had severe heart failure-he's had an excellent diuretic response with 14 L net output since admission and has responded nicely to a Bumex drip.  His laboratory data is pending today to see what his BNP and BMP done  2 LV dysfunction will need a LifeVest prior to discharge  3 left pleural effusion the patient will get a (decubitus chest x-ray today to see if the effusion needs to be drained  4 hypokalemia follow-up potassium this morning    Jimmy Prescott MD  07/12/17  7:54 AM

## 2017-07-12 NOTE — CONSULTS
"Diabetes Education  Assessment/Teaching    Patient Name:  Luis F Plummer  YOB: 1945  MRN: [id number]  Admit Date:  7/5/2017      Assessment Date:  7/12/2017       Most Recent Value    General Information      Referral From:  A1c, Blood glucose, MD order    Height  5' 11\" (1.803 m)    Height Method  Stated    Weight  170 lb (77.1 kg)    Weight Method  Stated    Pregnancy Assessment     Diabetes History     What type of diabetes do you have?  Type 2    Length of Diabetes Diagnosis  6 - 10 years    Current DM knowledge  fair    Have you had diabetes education/teaching in the past?  no    Do you test your blood sugar at home?  yes    Frequency of checks  3 times per week    Meter type  Doesn't remember    Who performs the test?  Self    Typical readings  150s    Have you had low blood sugar? (<70mg/dl)  yes    How often do you have low blood sugar?  rare    Have you had high blood sugar? (>140mg/dl)  yes    How often do you have high blood sugar?  rare    Education Preferences     What areas of diabetes would you like to learn about?  -- [Pt. states he knows all about diabetes and has no need to learn anything more]    Nutrition Information     Assessment Topics     Healthy Eating - Assessment  Needs education    Being Active - Assessment  Needs education    Taking Medication - Assessment  Needs education    Monitoring - Assessment  Competent    DM Goals     Taking Medication - Goal  0-7 days from discharge    Monitoring - Goal  0-7 days from discharge    Contact Plan  Follow-up medical care               Most Recent Value    DM Education Needs     Meter  Has own    Meter Type  -- [Doesn't remember]    Frequency of Testing  3 times a week    Blood Glucose Target Range  80 to 130    Medication  Oral    Problem Solving  Hypoglycemia, Hyperglycemia, Symptoms, Signs, Treatment    Reducing Risks  A1C testing, Foot care, Immunizations, Eye exam    Physical Activity  Walking    Physical Activity Frequency  " Occasionally    Motivation  Moderate    Teaching Method  Explanation, Discussion, Handouts, Teach back    Patient Response  Needs reinforcement            Other Comments:    Mr. Plummer was seen for diabetic education. He states he knows all about diabetes, but was willing to listen to education.   Discussed and taught patient about type 2 diabetes self-management, risk factors, and importance of blood glucose control to reduce complications. Target blood glucose readings and A1c goals per ADA were reviewed. Reviewed with patient current A1c and discussed its significance. Signs, symptoms and treatment of hyperglycemia and hypoglycemia were discussed. Lifestyle changes such as physical activity with MD approval and healthy eating were encouraged. Pt instructed to  check blood sugar 2 times per day and to call PCP if  Blood glucose is higher than 180 two times or more.  Patient was encouraged to keep record of blood glucose readings to take to follow up appointment with PCP.  Pt was offered a free op follow up appointment however declined at this time.        Electronically signed by:  Riya Aquino RN  07/12/17 3:31 PM

## 2017-07-13 LAB
ANION GAP SERPL CALCULATED.3IONS-SCNC: 6 MMOL/L (ref 3–11)
BNP SERPL-MCNC: 408 PG/ML (ref 0–100)
BNP SERPL-MCNC: 422 PG/ML (ref 0–100)
BUN BLD-MCNC: 52 MG/DL (ref 9–23)
BUN/CREAT SERPL: 34.7 (ref 7–25)
CALCIUM SPEC-SCNC: 9.7 MG/DL (ref 8.7–10.4)
CHLORIDE SERPL-SCNC: 94 MMOL/L (ref 99–109)
CO2 SERPL-SCNC: 38 MMOL/L (ref 20–31)
CREAT BLD-MCNC: 1.5 MG/DL (ref 0.6–1.3)
GFR SERPL CREATININE-BSD FRML MDRD: 46 ML/MIN/1.73
GLUCOSE BLD-MCNC: 86 MG/DL (ref 70–100)
GLUCOSE BLDC GLUCOMTR-MCNC: 195 MG/DL (ref 70–130)
GLUCOSE BLDC GLUCOMTR-MCNC: 236 MG/DL (ref 70–130)
GLUCOSE BLDC GLUCOMTR-MCNC: 258 MG/DL (ref 70–130)
GLUCOSE BLDC GLUCOMTR-MCNC: 98 MG/DL (ref 70–130)
POTASSIUM BLD-SCNC: 3.5 MMOL/L (ref 3.5–5.5)
POTASSIUM BLD-SCNC: 4.3 MMOL/L (ref 3.5–5.5)
RETICS/RBC NFR AUTO: 1.4 % (ref 0.5–1.5)
SODIUM BLD-SCNC: 138 MMOL/L (ref 132–146)

## 2017-07-13 PROCEDURE — 85045 AUTOMATED RETICULOCYTE COUNT: CPT | Performed by: HOSPITALIST

## 2017-07-13 PROCEDURE — 97530 THERAPEUTIC ACTIVITIES: CPT

## 2017-07-13 PROCEDURE — 84132 ASSAY OF SERUM POTASSIUM: CPT | Performed by: HOSPITALIST

## 2017-07-13 PROCEDURE — 82962 GLUCOSE BLOOD TEST: CPT

## 2017-07-13 PROCEDURE — 25010000002 HEPARIN (PORCINE) PER 1000 UNITS: Performed by: NURSE PRACTITIONER

## 2017-07-13 PROCEDURE — 99232 SBSQ HOSP IP/OBS MODERATE 35: CPT | Performed by: HOSPITALIST

## 2017-07-13 PROCEDURE — 63710000001 INSULIN DETEMIR PER 5 UNITS: Performed by: HOSPITALIST

## 2017-07-13 PROCEDURE — 83880 ASSAY OF NATRIURETIC PEPTIDE: CPT | Performed by: INTERNAL MEDICINE

## 2017-07-13 PROCEDURE — 80048 BASIC METABOLIC PNL TOTAL CA: CPT | Performed by: INTERNAL MEDICINE

## 2017-07-13 PROCEDURE — 99232 SBSQ HOSP IP/OBS MODERATE 35: CPT | Performed by: INTERNAL MEDICINE

## 2017-07-13 RX ADMIN — ATORVASTATIN CALCIUM 20 MG: 20 TABLET, FILM COATED ORAL at 20:39

## 2017-07-13 RX ADMIN — HEPARIN SODIUM 5000 UNITS: 5000 INJECTION, SOLUTION INTRAVENOUS; SUBCUTANEOUS at 09:05

## 2017-07-13 RX ADMIN — DOXYCYCLINE HYCLATE 100 MG: 100 CAPSULE ORAL at 20:39

## 2017-07-13 RX ADMIN — INSULIN LISPRO 3 UNITS: 100 INJECTION, SOLUTION INTRAVENOUS; SUBCUTANEOUS at 17:23

## 2017-07-13 RX ADMIN — INSULIN DETEMIR 5 UNITS: 100 INJECTION, SOLUTION SUBCUTANEOUS at 20:39

## 2017-07-13 RX ADMIN — POTASSIUM CHLORIDE 40 MEQ: 750 CAPSULE, EXTENDED RELEASE ORAL at 09:05

## 2017-07-13 RX ADMIN — ASPIRIN 81 MG: 81 TABLET, COATED ORAL at 09:05

## 2017-07-13 RX ADMIN — CALCIUM CARBONATE 2 TABLET: 500 TABLET ORAL at 09:08

## 2017-07-13 RX ADMIN — CARVEDILOL 3.12 MG: 3.12 TABLET, FILM COATED ORAL at 20:39

## 2017-07-13 RX ADMIN — NYSTATIN: 100000 POWDER TOPICAL at 09:05

## 2017-07-13 RX ADMIN — INSULIN LISPRO 2 UNITS: 100 INJECTION, SOLUTION INTRAVENOUS; SUBCUTANEOUS at 13:58

## 2017-07-13 RX ADMIN — LOSARTAN POTASSIUM 50 MG: 50 TABLET, FILM COATED ORAL at 09:05

## 2017-07-13 RX ADMIN — CARVEDILOL 3.12 MG: 3.12 TABLET, FILM COATED ORAL at 09:05

## 2017-07-13 RX ADMIN — POTASSIUM CHLORIDE 40 MEQ: 750 CAPSULE, EXTENDED RELEASE ORAL at 13:58

## 2017-07-13 RX ADMIN — INSULIN LISPRO 4 UNITS: 100 INJECTION, SOLUTION INTRAVENOUS; SUBCUTANEOUS at 20:45

## 2017-07-13 RX ADMIN — HEPARIN SODIUM 5000 UNITS: 5000 INJECTION, SOLUTION INTRAVENOUS; SUBCUTANEOUS at 20:38

## 2017-07-13 RX ADMIN — ACETAMINOPHEN 650 MG: 325 TABLET, FILM COATED ORAL at 13:58

## 2017-07-13 RX ADMIN — PANTOPRAZOLE SODIUM 40 MG: 40 TABLET, DELAYED RELEASE ORAL at 04:46

## 2017-07-13 RX ADMIN — DOXYCYCLINE HYCLATE 100 MG: 100 CAPSULE ORAL at 09:05

## 2017-07-13 RX ADMIN — ACETAMINOPHEN 650 MG: 325 TABLET, FILM COATED ORAL at 09:05

## 2017-07-13 RX ADMIN — LEVOTHYROXINE SODIUM 50 MCG: 50 TABLET ORAL at 04:46

## 2017-07-13 RX ADMIN — NYSTATIN: 100000 POWDER TOPICAL at 20:39

## 2017-07-13 NOTE — PROGRESS NOTES
Continued Stay Note  [facility]     Patient Name: Luis F Plummer  MRN: [id number]  Today's Date: 7/13/2017    Admit Date: 7/5/2017          Discharge Plan       07/13/17 1338    Case Management/Social Work Plan    Plan update    Patient/Family In Agreement With Plan yes    Additional Comments Called [facility] at [facility] [phone] to speak with Joana Hernandez, admissions coordinator who has stepped away.  Spoke with Lisseth who took CM name and callback number.  Awaiting callback regarding admission status.  CM following.              Discharge Codes     None        Expected Discharge Date and Time     Expected Discharge Date Expected Discharge Time    Jul 12, 2017             Joselin Gong, RN

## 2017-07-13 NOTE — THERAPY TREATMENT NOTE
Acute Care - Occupational Therapy Treatment Note  [facility]     Patient Name: Luis F Plummer  : 1945  MRN: [id number]  Today's Date: 2017  Onset of Illness/Injury or Date of Surgery Date: 17  Date of Referral to OT: 17  Referring Physician: FRANCO Irizarry      Admit Date: 2017    Visit Dx:     ICD-10-CM ICD-9-CM   1. Peripheral edema R60.9 782.3   2. Pleural effusion J90 511.9   3. Acute pulmonary edema J81.0 518.4   4. Acute on chronic systolic congestive heart failure I50.23 428.23     428.0   5. Hyperlipidemia LDL goal <70 E78.5 272.4   6. Acute on chronic systolic heart failure I50.23 428.23   7. Coronary artery disease of native artery of native heart with stable angina pectoris I25.118 414.01     413.9   8. Impaired mobility and ADLs Z74.09 799.89   9. Impaired functional mobility, balance, gait, and endurance Z74.09 V49.89     Patient Active Problem List   Diagnosis   • Peripheral edema   • Acute on chronic systolic heart failure   • Coronary artery disease of native artery of native heart with stable angina pectoris   • Essential hypertension   • Hyperlipidemia LDL goal <70   • DM (diabetes mellitus)   • CKD (chronic kidney disease)   • Tick bite   • Peripheral neuropathy   • Cellulitis-legs/feet with chronic ulcer- no osteomyelitis on MRI   • Ischemic cardiomyopathy   • Noncompliance             Adult Rehabilitation Note       17 1305 17 1300 17 1001    Rehab Assessment/Intervention    Discipline occupational therapist  -AC --  -EH physical therapist  -EH    Document Type therapy note (daily note)  -AC --  -EH therapy note (daily note)  -EH    Subjective Information agree to therapy  -AC --  -EH agree to therapy;no complaints  -EH    Patient Effort, Rehab Treatment adequate  -AC --  -EH adequate  -EH    Symptoms Noted During/After Treatment  --  -EH     Symptoms Noted Comment   increased foot pain  -EH    Precautions/Limitations fall precautions   no  longer has bed bug precautions  -AC --  -     Precautions/Limitations, Hearing hearing impairment, bilaterally  -      Specific Treatment Considerations --   [place]; does better if he sees your face  when speaking to him  -AC      Recorded by [AC] Catalina Cantor, OT [EH] Shauna Todd, PT [EH] Shauna Todd, PT    Vital Signs    Pre SpO2 (%)   95  -EH    O2 Delivery Pre Treatment   room air  -EH    Post SpO2 (%)   96  -EH    O2 Delivery Post Treatment   room air  -EH    Recorded by   [EH] Shauna Todd, PT    Pain Assessment    Pain Assessment 0-10  -AC --  -EH 0-10  -EH    Pain Score 4  -AC --  -EH 8  -EH    Post Pain Score 4  -AC --  -EH 8  -EH    Pain Location Foot  -AC --  - Foot  -EH    Pain Orientation Right;Left  -AC --  -EH Left;Right  -    Pain Intervention(s) Repositioned  - --  -EH Repositioned;Ambulation/increased activity  -    Response to Interventions  --  - pt states it worsens during weight bearing through LEs  -EH    Recorded by [AC] Catalina Cantor, OT [EH] Shauna Todd, PT [EH] Shauna Todd, PT    Cognitive Assessment/Intervention    Current Cognitive/Communication Assessment --   [place]   -AC --  - functional  -    Orientation Status oriented x 4  - --  - oriented x 4  -    Follows Commands/Answers Questions 100% of the time;needs cueing;needs repetition   delay with reponse at times possibly d/t hearing  - --  - 100% of the time;able to follow single-step instructions  -    Personal Safety mild impairment;decreased awareness, need for assist  - --  - decreased awareness, need for assist;decreased awareness, need for safety  -    Personal Safety Interventions fall prevention program maintained;gait belt;nonskid shoes/slippers when out of bed  - --  - fall prevention program maintained;gait belt  -EH    Recorded by [AC] Catalina Cantor, OT [EH] Shauna Todd, PT [EH] Shauna Todd, PT    Bed Mobility, Assessment/Treatment    Bed  Mobility, Assistive Device bed rails  -      Bed Mob, Supine to Sit, [place] verbal cues required;minimum assist (75% patient effort)  - --  - minimum assist (75% patient effort)  -    Bed Mob, Sit to Supine, [place]  --  -EH not tested  -    Recorded by [AC] Catalina Cantor, OT [EH] Shauna Todd, PT [EH] Shauna Todd, PT    Transfer Assessment/Treatment    Transfers, Bed-Chair [place]  --  -     Transfers, Bed-Chair-Bed, Assist Device  --  -     Transfers, Sit-Stand [place] verbal cues required;contact guard assist  -AC --  - contact guard assist;verbal cues required   VC for safe HP  -    Transfers, Stand-Sit [place] verbal cues required;contact guard assist  -AC --  - contact guard assist  -    Transfers, Sit-Stand-Sit, Assist Device rolling walker  - --  - rolling walker  -    Recorded by [AC] Catalina Cantor, OT [EH] Shauna Todd, PT [EH] Shauna Todd, PT    Gait Assessment/Treatment    Gait, [place] Level  --  - contact guard assist  -    Gait, Assistive Device  --  - rolling walker  -    Gait, Distance (Feet)  --  - 90  -    Gait, Gait Pattern Analysis  --  - swing-through gait  -    Gait, Gait Deviations  --  - decreased heel strike;step length decreased;toe-to-floor clearance decreased;forward flexed posture  -    Gait, Safety Issues  --  - weight-shifting ability decreased;step length decreased;sequencing ability decreased  -    Gait, Impairments  --  - coordination impaired  -    Gait, Comment   2 standing rest breaks; VC for improved posture.   -    Recorded by  [] Shauna Todd, PT [EH] Shauna Todd, PT    Functional Mobility    Functional Mobility- Ind. Level verbal cues required;contact guard assist  -      Functional Mobility- Device rolling walker  -      Functional Mobility-Distance (Feet) 90  -      Functional Mobility- Safety Issues balance decreased during turns  -       Functional Mobility- Comment pt required 1 standing restbreak d/t pain  -AC      Recorded by [AC] Catalina Cantor OT      Lower Body Dressing Assessment/Training    LB Dressing Assess/Train, Clothing Type donning:;slipper socks  -AC      LB Dressing Assess/Train, Position sitting  -AC      LB Dressing Assess/Train, [place] supervision required  -AC      Recorded by [AC] Catalina Cantor OT      Balance Skills Training    Sitting-Level of Assistance Distant supervision  -      Standing-Level of Assistance Contact guard  -AC      Static Standing Balance Support assistive device  -AC      Gait Balance-Level of Assistance Contact guard  -AC      Gait Balance Support assistive device  -AC      Recorded by [AC] Catalina Cantor OT      Therapy Exercises    Right Lower Extremity  --  -EH     Bilateral Lower Extremities  --  -EH 15 reps;ankle pumps/circles;LAQ;SLR;hip IR;hip ER;glut sets  -    Bilateral Upper Extremity AROM:;10 reps;elbow flexion/extension;shoulder extension/flexion;shoulder horizontal abd/add  -AC      Recorded by [AC] Catalina Cantor, OT [EH] Shauna Todd, PT [EH] Shauna Todd, PT    Positioning and Restraints    Pre-Treatment Position in bed  -AC --  -EH in bed  -EH    Post Treatment Position chair  -AC --  -EH chair  -EH    In Chair reclined;call light within reach;encouraged to call for assist;exit alarm on;notified nsg  -AC --  - notified nsg;reclined;call light within reach;encouraged to call for assist;exit alarm on  -EH    Recorded by [AC] Catalina Cantor, OT [EH] Shauna Todd, PT [] Shauna Todd, PT      07/11/17 1305 07/11/17 1304       Rehab Assessment/Intervention    Discipline occupational therapist  -MM physical therapist  -     Document Type therapy note (daily note)   partial co-tx with PT  -MM therapy note (daily note)  -EH     Subjective Information agree to therapy;no complaints  -MM agree to therapy;no complaints  -EH     Patient Effort, Rehab Treatment  good  -MM good  -EH     Symptoms Noted During/After Treatment none  -MM none  -EH     Precautions/Limitations fall precautions;other (see comments)   MRSA, bed bugs (unclear whether restrictions are lifted)  -MM fall precautions  -EH     Recorded by [MM] Brenda Duarte, OT [EH] Shauna Todd, PT     Vital Signs    Pre Systolic BP Rehab --   Vitals stable throughout session  -MM      Recorded by [MM] Brenda Duarte OT      Pain Assessment    Pain Assessment Ansari-Simpson FACES  -MM Ansari-Simpson FACES  -EH     Ansari-Baker FACES Pain Rating 4  -MM 4  -EH     Pain Location Chest  -MM      Pain Orientation Left  -MM      Pain Intervention(s) Repositioned;Ambulation/increased activity;Other (Comment)   Notified RN  -MM Repositioned;Ambulation/increased activity  -EH     Response to Interventions appears to have improved by end of session  -MM appears to have improved.  -EH     Recorded by [MM] Brenda Duarte, OT [EH] Shauna Todd, PT     Cognitive Assessment/Intervention    Current Cognitive/Communication Assessment functional  -MM functional  -EH     Orientation Status oriented x 4  -MM oriented x 4  -EH     Follows Commands/Answers Questions 100% of the time;able to follow single-step instructions;needs cueing;needs increased time;needs repetition  -% of the time;able to follow single-step instructions;needs cueing;needs increased time;needs repetition  -EH     Personal Safety decreased awareness, need for assist;decreased awareness, need for safety;decreased insight to deficits  -MM decreased awareness, need for assist;decreased awareness, need for safety;mild impairment  -EH     Personal Safety Interventions fall prevention program maintained;gait belt;nonskid shoes/slippers when out of bed  -MM fall prevention program maintained;gait belt;nonskid shoes/slippers when out of bed  -EH     Recorded by [MM] Brenda Duarte, OT [EH] Shauna Todd, PT     Bed Mobility,  Assessment/Treatment    Bed Mob, Supine to Sit, [place] contact guard assist  -MM contact guard assist  -     Bed Mobility, Comment Increased time and effort, vc for sequencing  -MM Increased time needed; VC for squencing.  -     Recorded by [MM] Brenda Duarte OT [EH] Shauna Todd, PT     Transfer Assessment/Treatment    Transfers, Bed-Chair [place] minimum assist (75% patient effort);2 person assist required  -MM minimum assist (75% patient effort);2 person assist required  -EH     Transfers, Bed-Chair-Bed, Assist Device rolling walker  -MM rolling walker  -EH     Transfers, Sit-Stand [place] minimum assist (75% patient effort);2 person assist required;verbal cues required  -MM minimum assist (75% patient effort);2 person assist required   from bed; CGA x 2 from chair  -EH     Transfers, Stand-Sit [place] contact guard assist;2 person assist required  -MM contact guard assist;2 person assist required  -EH     Transfers, Sit-Stand-Sit, Assist Device rolling walker  -MM rolling walker  -EH     Transfer, Impairments  strength decreased;coordination impaired;impaired balance  -     Transfer, Comment VC for hand placement and sequencing;progressed to CGA x2 for second STS attempt (from chair)  -MM VC given for hand placement, movement of walker, sequencing of turn. Assist needed for control of RWx to turn  -     Recorded by [MM] Brenda Duarte, OT [EH] Shauna Todd, PT     Gait Assessment/Treatment    Gait, [place] Level  contact guard assist;verbal cues required  -     Gait, Assistive Device  rolling walker  -     Gait, Distance (Feet)  5  -     Gait, Gait Pattern Analysis  swing-to gait  -     Gait, Gait Deviations  decreased heel strike;step length decreased  -     Gait, Safety Issues  step length decreased;balance decreased during turns;weight-shifting ability decreased  -     Gait, Impairments  coordination impaired  -     Gait, Comment   Additional 2 minutes high steps in place and standing ther ex.   -EH     Recorded by  [EH] Shauna Todd, PT     Functional Mobility    Functional Mobility- Ind. Level minimum assist (75% patient effort);verbal cues required;nonverbal cues required (demo/gesture)  -MM      Functional Mobility- Device rolling walker  -MM      Functional Mobility- Comment VC for directionality/turns and sequencing, min A Rwx management  -MM      Recorded by [MM] Brenda Duarte OT      Lower Body Dressing Assessment/Training    LB Dressing Assess/Train, Clothing Type donning:;slipper socks  -MM      LB Dressing Assess/Train, Position sitting  -MM      LB Dressing Assess/Train, [place] supervision required  -MM      Recorded by [MM] Brenda Duarte OT      Toileting Assessment/Training    Toileting Assess/Train, Assistive Device urinal  -MM      Toileting Assess/Train, Position supine  -MM      Toileting Assess/Train, [place] Level moderate assist (50% patient effort)  -MM      Toileting Assess/Train, Comment Pt used urinal in supine; moderate spillage  -MM      Recorded by [MM] Brenda Duarte OT      Grooming Assessment/Training    Grooming Assess/Train, Position sitting  -MM      Grooming Assess/Train, [place] Level set up required;verbal cues required  -MM      Grooming Assess/Train, Comment Wipe/dry hands after urinal use.   -MM      Recorded by [MM] Brenda Duarte OT      Therapy Exercises    Bilateral Lower Extremities  AROM:;standing;hip flexion;hip extension;hip abduction/adduction  -EH     Bilateral Upper Extremity AROM:;15 reps;elbow flexion/extension;hand pumps;shoulder extension/flexion;shoulder horizontal abd/add  -MM      Recorded by [MM] Brenda Duarte OT [EH] Shauna Todd, PT     Positioning and Restraints    Pre-Treatment Position in bed  -MM in bed  -EH     Post Treatment Position chair  -MM chair  -EH     In Chair notified nsg;reclined;call light within  reach;encouraged to call for assist;exit alarm on  -MM reclined;notified nsg;call light within reach;encouraged to call for assist;exit alarm on  -EH     Recorded by [MM] Brenda Duarte, OT [EH] Shauna Todd, PT       User Key  (r) = Recorded By, (t) = Taken By, (c) = Cosigned By    Initials Name Effective Dates    AC Catalina Cantor, OT 06/23/15 -     EH Shauna Todd, PT 06/19/15 -     MM Brenda Duarte, OT 06/22/15 -                 OT Goals       07/13/17 1349 07/11/17 1431 07/09/17 1253    Bed Mobility OT LTG    Bed Mobility OT LTG, Outcome goal ongoing  -AC goal ongoing   progressing  -MM goal ongoing  -CL    Transfer Training OT LTG    Transfer Training OT LTG, Outcome goal ongoing  -AC goal ongoing   progressing  -MM goal ongoing  -CL    LB Dressing OT LTG    LB Dressing Goal OT LTG, Outcome goal partially met   met to don socks  -AC goal partially met   supervision for socks  -MM goal ongoing  -CL      07/06/17 1401          Bed Mobility OT LTG    Bed Mobility OT LTG, Date Established 07/06/17  -JR      Bed Mobility OT LTG, Time to Achieve 1 wk  -JR      Bed Mobility OT LTG, Activity Type all bed mobility  -JR      Bed Mobility OT LTG, [place] Level conditional independence  -JR      Transfer Training OT LTG    Transfer Training OT LTG, Date Established 07/06/17  -JR      Transfer Training OT LTG, Time to Achieve 1 wk  -JR      Transfer Training OT LTG, Activity Type bed to chair /chair to bed  -JR      Transfer Training OT LTG, [place] Level supervision required  -JR      Transfer Training OT LTG, Assist Device other (see comments)   with appropriate AD  -JR      LB Dressing OT LTG    LB Dressing Goal OT LTG, Date Established 07/06/17  -JR      LB Dressing Goal OT LTG, Time to Achieve 1 wk  -JR      LB Dressing Goal OT LTG, Activity Type alvin/doff B socks  -JR      LB Dressing Goal OT LTG, [place] Level moderate assist (50% patient effort)  -JR      LB Dressing Goal  OT LTG, Adaptive Equipment reacher;sock-aid  -JR        User Key  (r) = Recorded By, (t) = Taken By, (c) = Cosigned By    Initials Name Provider Type    AC Catalina Cantor, OT Occupational Therapist    MM Brenda Duarte, OT Occupational Therapist    JR Hilary Bolden, OT Occupational Therapist    CL Basilia Gautam, OT Occupational Therapist          Occupational Therapy Education     Title: PT OT SLP Therapies (Active)     Topic: Occupational Therapy (Active)     Point: ADL training (Active)    Description: Instruct learner(s) on proper safety adaptation and remediation techniques during self care or transfers.   Instruct in proper use of assistive devices.    Learning Progress Summary    Learner Readiness Method Response Comment Documented by Status   Patient Acceptance E NR safety with ADLs and use of walker AC 07/13/17 1349 Active    Acceptance E,TB VU,NR Guided through HEP, educated on precautions and improved mechanics for ADL/fnxl mob/tsf. MM 07/11/17 1430 Done    Acceptance E,D NR Pt educated on appropriate safety precautions, t/f techniques, and benefits of therapy. CL 07/09/17 1252 Active    Acceptance E NR Educated pt on appropriate transfer techniques, appropiate safety precautions, role of therapy and benefits of activity. JR 07/06/17 1359 Active               Point: Home exercise program (Done)    Description: Instruct learner(s) on appropriate technique for monitoring, assisting and/or progressing therapeutic exercises/activities.    Learning Progress Summary    Learner Readiness Method Response Comment Documented by Status   Patient Acceptance E,TB VU,NR Guided through HEP, educated on precautions and improved mechanics for ADL/fnxl mob/tsf. MM 07/11/17 1430 Done               Point: Precautions (Done)    Description: Instruct learner(s) on prescribed precautions during self-care and functional transfers.    Learning Progress Summary    Learner Readiness Method Response Comment Documented by  Status   Patient Acceptance E,TB VU,NR Guided through HEP, educated on precautions and improved mechanics for ADL/fnxl mob/tsf.  07/11/17 1430 Done    Acceptance E,D NR Pt educated on appropriate safety precautions, t/f techniques, and benefits of therapy.  07/09/17 1252 Active               Point: Body mechanics (Done)    Description: Instruct learner(s) on proper positioning and spine alignment during self-care, functional mobility activities and/or exercises.    Learning Progress Summary    Learner Readiness Method Response Comment Documented by Status   Patient Acceptance E,TB VU,NR Guided through HEP, educated on precautions and improved mechanics for ADL/fnxl mob/tsf.  07/11/17 1430 Done    Acceptance E,D NR Pt educated on appropriate safety precautions, t/f techniques, and benefits of therapy.  07/09/17 1252 Active                      User Key     Initials Effective Dates Name Provider Type Discipline     06/23/15 -  Catalina Cantor, OT Occupational Therapist OT     06/22/15 -  Brenda Duarte, OT Occupational Therapist OT     06/22/15 -  Hilary Bolden, OT Occupational Therapist OT     06/08/16 -  Basilia Gautam, OT Occupational Therapist OT                  OT Recommendation and Plan  Anticipated Equipment Needs At Discharge: bedside commode, tub bench  Anticipated Discharge Disposition: inpatient rehabilitation facility  Planned Therapy Interventions: ADL retraining, adaptive equipment training, balance training, bed mobility training, transfer training  Therapy Frequency: daily  Plan of Care Review  Plan Of Care Reviewed With: patient  Progress: progress toward functional goals as expected  Outcome Summary/Follow up Plan: Pt progressing with LBD and bed mobility. Ambulation limited by fatique and LE pain.         Outcome Measures       07/13/17 1305 07/11/17 1305 07/11/17 1304    How much help from another person do you currently need...    Turning from your back to your side while in  flat bed without using bedrails?   4  -EH    Moving from lying on back to sitting on the side of a flat bed without bedrails?   3  -EH    Moving to and from a bed to a chair (including a wheelchair)?   3  -EH    Standing up from a chair using your arms (e.g., wheelchair, bedside chair)?   3  -EH    Climbing 3-5 steps with a railing?   2  -EH    To walk in hospital room?   3  -EH    AM-PAC 6 Clicks Score   18  -EH    How much help from another is currently needed...    Putting on and taking off regular lower body clothing? 3  -AC 2  -MM     Bathing (including washing, rinsing, and drying) 3  -AC 2  -MM     Toileting (which includes using toilet bed pan or urinal) 2  -AC 2  -MM     Putting on and taking off regular upper body clothing 3  -AC 3  -MM     Taking care of personal grooming (such as brushing teeth) 4  -AC 4  -MM     Eating meals 4  -AC 4  -MM     Score 19  -AC 17  -MM     Functional Assessment    Outcome Measure Options   AM-PAC 6 Clicks Basic Mobility (PT)  -      User Key  (r) = Recorded By, (t) = Taken By, (c) = Cosigned By    Initials Name Provider Type     Catalina Cantor, OT Occupational Therapist     Shauna Todd, PT Physical Therapist     Brenda Duarte, OT Occupational Therapist           Time Calculation:         Time Calculation- OT       07/13/17 1352          Time Calculation- OT    OT Start Time 1305  -      Total Timed Code Minutes- OT 25 minute(s)  -      OT Received On 07/13/17  -      OT Goal Re-Cert Due Date 07/16/17  -        User Key  (r) = Recorded By, (t) = Taken By, (c) = Cosigned By    Initials Name Provider Type     Catalina Cantor OT Occupational Therapist           Therapy Charges for Today     Code Description Service Date Service Provider Modifiers Qty    [id number]  OT THERAPEUTIC ACT EA 15 MIN 7/13/2017 Catalina Cantor OT GO 2               Catalina Cantor OT  7/13/2017

## 2017-07-13 NOTE — PLAN OF CARE
Problem: Patient Care Overview (Adult)  Goal: Plan of Care Review  Outcome: Ongoing (interventions implemented as appropriate)    07/13/17 0550   Coping/Psychosocial Response Interventions   Plan Of Care Reviewed With patient   Patient Care Overview   Progress no change   Outcome Evaluation   Outcome Summary/Follow up Plan Pt rested well overnight. No acute overnight events.         Problem: Fall Risk (Adult)  Goal: Identify Related Risk Factors and Signs and Symptoms  Outcome: Ongoing (interventions implemented as appropriate)  Goal: Absence of Falls  Outcome: Ongoing (interventions implemented as appropriate)    Problem: Skin Integrity Impairment, Risk/Actual (Adult)  Goal: Identify Related Risk Factors and Signs and Symptoms  Outcome: Ongoing (interventions implemented as appropriate)    Problem: Cardiac Output, Decreased (Adult)  Goal: Identify Related Risk Factors and Signs and Symptoms  Outcome: Ongoing (interventions implemented as appropriate)  Goal: Adequate Cardiac Output/Effective Tissue Perfusion  Outcome: Ongoing (interventions implemented as appropriate)

## 2017-07-13 NOTE — PLAN OF CARE
Problem: Patient Care Overview (Adult)  Goal: Plan of Care Review  Outcome: Ongoing (interventions implemented as appropriate)    07/13/17 1349   Coping/Psychosocial Response Interventions   Plan Of Care Reviewed With patient   Patient Care Overview   Progress progress toward functional goals as expected   Outcome Evaluation   Outcome Summary/Follow up Plan Pt progressing with LBD and bed mobility. Ambulation limited by fatique and LE pain.          Problem: Inpatient Occupational Therapy  Goal: Bed Mobility Goal LTG- OT  Outcome: Ongoing (interventions implemented as appropriate)    07/06/17 1401 07/13/17 1349   Bed Mobility OT LTG   Bed Mobility OT LTG, Date Established 07/06/17 --    Bed Mobility OT LTG, Time to Achieve 1 wk --    Bed Mobility OT LTG, Activity Type all bed mobility --    Bed Mobility OT LTG, [place] Level conditional independence --    Bed Mobility OT LTG, Outcome --  goal ongoing       Goal: Transfer Training Goal 1 LTG- OT  Outcome: Ongoing (interventions implemented as appropriate)    07/06/17 1401 07/13/17 1349   Transfer Training OT LTG   Transfer Training OT LTG, Date Established 07/06/17 --    Transfer Training OT LTG, Time to Achieve 1 wk --    Transfer Training OT LTG, Activity Type bed to chair /chair to bed --    Transfer Training OT LTG, [place] Level supervision required --    Transfer Training OT LTG, Assist Device other (see comments)  (with appropriate AD) --    Transfer Training OT LTG, Outcome --  goal ongoing       Goal: LB Dressing Goal LTG- OT  Outcome: Ongoing (interventions implemented as appropriate)    07/06/17 1401 07/13/17 1349   LB Dressing OT LTG   LB Dressing Goal OT LTG, Date Established 07/06/17 --    LB Dressing Goal OT LTG, Time to Achieve 1 wk --    LB Dressing Goal OT LTG, Activity Type alvin/doff B socks --    LB Dressing Goal OT LTG, [place] Level moderate assist (50% patient effort) --    LB Dressing Goal OT LTG, Adaptive Equipment  reacher;sock-aid --    LB Dressing Goal OT LTG, Outcome --  goal partially met  (met to don socks)

## 2017-07-13 NOTE — PROGRESS NOTES
[facility] at [facility]  IP Progress Note      Chief Complaint/Reason for service:  Systolic heart failure    Subjective: The patient complains of weakness but his breathing is good and he is not complaining of any chest pain.  The patient states he plans to continue seeing his cardiologist in [place]    Past medical, surgical, social and family history reviewed in the patient's electronic medical record.         Vital Sign Min/Max for last 24 hours  Temp  Min: 98.2 °F (36.8 °C)  Max: 98.7 °F (37.1 °C)   BP  Min: 110/63  Max: 120/70   Pulse  Min: 71  Max: 86   Resp  Min: 20  Max: 20   No Data Recorded   No Data Recorded      Intake/Output Summary (Last 24 hours) at 07/13/17 0811  Last data filed at 07/13/17 0440   Gross per 24 hour   Intake             1920 ml   Output             5026 ml   Net            -3106 ml             Current Facility-Administered Medications:   •  acetaminophen (TYLENOL) tablet 650 mg, 650 mg, Oral, Q4H PRN, Gris Dixie V, APRN, 650 mg at 07/11/17 2130  •  aspirin EC tablet 81 mg, 81 mg, Oral, Daily, Maryan Alonso, APRN, 81 mg at 07/12/17 0911  •  atorvastatin (LIPITOR) tablet 20 mg, 20 mg, Oral, Nightly, Maryan Alonso, APRN, 20 mg at 07/12/17 2125  •  calcium carbonate (TUMS) chewable tablet 500 mg (200 mg elemental), 2 tablet, Oral, BID PRN, Gris Dixie V, APRN, 2 tablet at 07/08/17 0901  •  carvedilol (COREG) tablet 3.125 mg, 3.125 mg, Oral, Q12H, Maryan Alonso, APRN, 3.125 mg at 07/12/17 2125  •  dextrose (D50W) solution 25 g, 25 g, Intravenous, Q15 Min PRN, Monet Kent MD  •  dextrose (GLUTOSE) oral gel 15 g, 15 g, Oral, Q15 Min PRN, Monet Kent MD  •  doxycycline (VIBRAMYCIN) capsule 100 mg, 100 mg, Oral, Q12H, Cy Banerjee formerly [facility], 100 mg at 07/12/17 2125  •  glucagon (GLUCAGEN) injection 1 mg, 1 mg, Subcutaneous, Q15 Min PRN, Monet Kent MD  •  heparin (porcine) 5000 UNIT/ML injection 5,000 Units, 5,000  Units, Subcutaneous, Q12H, FRANCO Delgado, 5,000 Units at 07/12/17 2125  •  insulin detemir (LEVEMIR) injection 7 Units, 7 Units, Subcutaneous, Q12H, Monet Kent MD, 7 Units at 07/12/17 2128  •  insulin lispro (humaLOG) injection 0-7 Units, 0-7 Units, Subcutaneous, 4x Daily AC & at Bedtime, Monet Kent MD, 3 Units at 07/12/17 2126  •  levothyroxine (SYNTHROID, LEVOTHROID) tablet 50 mcg, 50 mcg, Oral, Q AM, Monet Kent MD, 50 mcg at 07/13/17 0446  •  losartan (COZAAR) tablet 50 mg, 50 mg, Oral, Daily, Gris Irizarry V APRN, 50 mg at 07/12/17 0911  •  nitroglycerin (NITROSTAT) SL tablet 0.4 mg, 0.4 mg, Sublingual, Q5 Min PRN, Gris WOODARD APRN, 0.4 mg at 07/09/17 2036  •  nystatin (MYCOSTATIN) powder, , Topical, Q12H, Monet Kent MD  •  pantoprazole (PROTONIX) EC tablet 40 mg, 40 mg, Oral, Q AM, Monet Kent MD, 40 mg at 07/13/17 0446  •  Pharmacy Consult - [facility], , Does not apply, Daily, Cy Banerjee [facility]  •  potassium chloride (KLOR-CON) packet 40 mEq, 40 mEq, Oral, PRN, Monet Kent MD  •  potassium chloride (MICRO-K) CR capsule 40 mEq, 40 mEq, Oral, PRN, Monet Kent MD, 40 mEq at 07/12/17 1719  •  sodium chloride 0.9 % flush 1-10 mL, 1-10 mL, Intravenous, PRN, Gris Irizarry V APRN    Physical Exam: General  thin white male who looks a little weak but is in no acute distress not requiring nasal O2        HEENT: No JVP is present no scleral icterus       Respiratory:  Equal bowel symmetrical expansion clear on the right decreased breath sounds on the left with E to a changes       Cardiovascular: Regular rate and rhythm without murmur gallop or click and no peripheral edema       GI: Positive bowel sounds       Lower Extremities: No edema mild evidence of venous stasis disease       Neuro: Cranial nerves II through XII are grossly normal he moves all 4 extremities but this patient exam       Skin:  Warm and dry with no  edema to palpation       Psych: Pleasant affect    Results Review: Reviewed all the patient's data which shows his creatinine is 1.5 which is down from yesterday but the BUN up with slightly elevated.  An excellent diuretic response yesterday of 3 additional liters.  His vital signs looked good on losartan and Coreg      Radiology Results:  Imaging Results (last 72 hours)     Procedure Component Value Units Date/Time    XR Chest Lateral Decubitus Left [[id number]] Collected:  07/12/17 1310     Updated:  07/12/17 1422    Narrative:       EXAMINATION: XR CHEST LATERAL DECUBITUS LEFT-      INDICATION: Assess effusion for possible thoracentesis; R60.9-Edema,  unspecified; T53-Kumwwqd effusion, not elsewhere classified; J81.0-Acute  pulmonary edema; I50.23-Acute on chronic systolic (congestive) heart  failure; E78.5-Hyperlipidemia, unspecified; I25.118-Atherosclerotic  heart disease of native coronary artery with other forms of angina  pectoris.      COMPARISON: None.     FINDINGS: Three left lateral decubitus images of the chest were  performed. There is evidence of a small layering left free effusion  which layers to a thickness of 1.7 cm along the left chest wall. Some of  the fluid is contained at the left base.           Impression:       1. Small free fluid layering along the left chest as described and  measured above.  2. Some of the density and fluid remains at the left lung base on the  decubitus views.     D:  07/12/2017  E:  07/12/2017     This report was finalized on 7/12/2017 2:20 PM by Dr. Beny Rodriguez MD.             EKG: Sinus rhythm    ECHO: Severe LV dysfunction with EF 20% and moderate MR    Tele:      Assessment/Plan: 1 left ventricular dysfunction with severe heart failure-greatly improved the patient is diuresed a net of 17 L.  Diuretic was held yesterday because his creatinine went up  2 left pleural effusion is also significantly improved from admission and is only a mild amount of layering so  that will not need thoracentesis  3 patient needs a LifeVest prior to discharge  4 discharge planning  needed see the patient to make sure his medications are provided when he leaves  5 the patient plans to continue seeing his cardiologist in [place].  I recommend that he get an echo in 4-6 weeks to see if his EF is improved enough to take off a LifeVest to continue to wear it  From cardiology point of view the patient can be discharged so we will sign off call us again if needed.  I recommended CHF training    Jimmy Prescott MD  07/13/17  8:11 AM
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
## 2017-07-14 LAB
GLUCOSE BLDC GLUCOMTR-MCNC: 136 MG/DL (ref 70–130)
GLUCOSE BLDC GLUCOMTR-MCNC: 184 MG/DL (ref 70–130)
GLUCOSE BLDC GLUCOMTR-MCNC: 187 MG/DL (ref 70–130)
GLUCOSE BLDC GLUCOMTR-MCNC: 225 MG/DL (ref 70–130)

## 2017-07-14 PROCEDURE — 63710000001 INSULIN LISPRO (HUMAN) PER 5 UNITS: Performed by: INTERNAL MEDICINE

## 2017-07-14 PROCEDURE — 82962 GLUCOSE BLOOD TEST: CPT

## 2017-07-14 PROCEDURE — 63710000001 INSULIN DETEMIR PER 5 UNITS: Performed by: HOSPITALIST

## 2017-07-14 PROCEDURE — 97110 THERAPEUTIC EXERCISES: CPT

## 2017-07-14 PROCEDURE — 97116 GAIT TRAINING THERAPY: CPT

## 2017-07-14 PROCEDURE — 25010000002 HEPARIN (PORCINE) PER 1000 UNITS: Performed by: NURSE PRACTITIONER

## 2017-07-14 PROCEDURE — 99233 SBSQ HOSP IP/OBS HIGH 50: CPT | Performed by: INTERNAL MEDICINE

## 2017-07-14 RX ORDER — BUMETANIDE 1 MG/1
1 TABLET ORAL 2 TIMES DAILY
Status: DISCONTINUED | OUTPATIENT
Start: 2017-07-14 | End: 2017-07-20

## 2017-07-14 RX ADMIN — HEPARIN SODIUM 5000 UNITS: 5000 INJECTION, SOLUTION INTRAVENOUS; SUBCUTANEOUS at 22:19

## 2017-07-14 RX ADMIN — INSULIN LISPRO 2 UNITS: 100 INJECTION, SOLUTION INTRAVENOUS; SUBCUTANEOUS at 18:27

## 2017-07-14 RX ADMIN — LEVOTHYROXINE SODIUM 50 MCG: 50 TABLET ORAL at 05:40

## 2017-07-14 RX ADMIN — CARVEDILOL 3.12 MG: 3.12 TABLET, FILM COATED ORAL at 09:10

## 2017-07-14 RX ADMIN — NYSTATIN: 100000 POWDER TOPICAL at 09:10

## 2017-07-14 RX ADMIN — DOXYCYCLINE HYCLATE 100 MG: 100 CAPSULE ORAL at 22:21

## 2017-07-14 RX ADMIN — INSULIN LISPRO 3 UNITS: 100 INJECTION, SOLUTION INTRAVENOUS; SUBCUTANEOUS at 18:27

## 2017-07-14 RX ADMIN — ASPIRIN 81 MG: 81 TABLET, COATED ORAL at 09:10

## 2017-07-14 RX ADMIN — ATORVASTATIN CALCIUM 20 MG: 20 TABLET, FILM COATED ORAL at 22:21

## 2017-07-14 RX ADMIN — NYSTATIN: 100000 POWDER TOPICAL at 22:21

## 2017-07-14 RX ADMIN — BUMETANIDE 1 MG: 1 TABLET ORAL at 18:27

## 2017-07-14 RX ADMIN — PANTOPRAZOLE SODIUM 40 MG: 40 TABLET, DELAYED RELEASE ORAL at 05:40

## 2017-07-14 RX ADMIN — DOXYCYCLINE HYCLATE 100 MG: 100 CAPSULE ORAL at 09:10

## 2017-07-14 RX ADMIN — INSULIN LISPRO 2 UNITS: 100 INJECTION, SOLUTION INTRAVENOUS; SUBCUTANEOUS at 22:20

## 2017-07-14 RX ADMIN — INSULIN LISPRO 3 UNITS: 100 INJECTION, SOLUTION INTRAVENOUS; SUBCUTANEOUS at 12:28

## 2017-07-14 RX ADMIN — HEPARIN SODIUM 5000 UNITS: 5000 INJECTION, SOLUTION INTRAVENOUS; SUBCUTANEOUS at 09:10

## 2017-07-14 RX ADMIN — LOSARTAN POTASSIUM 50 MG: 50 TABLET, FILM COATED ORAL at 09:10

## 2017-07-14 RX ADMIN — CARVEDILOL 3.12 MG: 3.12 TABLET, FILM COATED ORAL at 22:20

## 2017-07-14 RX ADMIN — INSULIN DETEMIR 5 UNITS: 100 INJECTION, SOLUTION SUBCUTANEOUS at 22:21

## 2017-07-14 NOTE — PLAN OF CARE
Problem: Patient Care Overview (Adult)  Goal: Plan of Care Review  Outcome: Ongoing (interventions implemented as appropriate)    07/14/17 1314   Coping/Psychosocial Response Interventions   Plan Of Care Reviewed With patient   Outcome Evaluation   Outcome Summary/Follow up Plan Pt ambulates in krishnan x 96 ft this date with continued c/o pain with weightbearing. LE fatigue with ther ex.         Problem: Inpatient Physical Therapy  Goal: Transfer Training Goal 1 LTG- PT  Outcome: Ongoing (interventions implemented as appropriate)    07/06/17 1410 07/14/17 1314   Transfer Training PT LTG   Transfer Training PT LTG, Date Established 07/06/17 --    Transfer Training PT LTG, Time to Achieve 2 wks --    Transfer Training PT LTG, Activity Type bed to chair /chair to bed;sit to stand/stand to sit --    Transfer Training PT LTG, [place] Level supervision required --    Transfer Training PT LTG, Assist Device walker, rolling --    Transfer Training PT LTG, Outcome --  goal ongoing       Goal: Gait Training Goal LTG- PT  Outcome: Ongoing (interventions implemented as appropriate)    07/06/17 1410 07/14/17 1314   Gait Training PT LTG   Gait Training Goal PT LTG, Date Established 07/06/17 --    Gait Training Goal PT LTG, Time to Achieve 2 wks --    Gait Training Goal PT LTG, [place] Level supervision required --    Gait Training Goal PT LTG, Assist Device walker, rolling --    Gait Training Goal PT LTG, Distance to Achieve 200 --    Gait Training Goal PT LTG, Outcome --  goal ongoing       Goal: Dynamic Standing Balance Goal LTG- PT  Outcome: Ongoing (interventions implemented as appropriate)    07/06/17 1410 07/09/17 1207 07/14/17 1314   Dynamic Standing Balance PT LTG   Dynamic Standing Balance PT LTG, Date Established 07/06/17 --  --    Dynamic Standing Balance PT LTG, Time to Achieve 2 wks --  --    Dynamic Standing Balance PT LTG, [place] Level --  conditional independence --    Dynamic Standing Balance PT LTG,  Assist Device --  assistive Device --    Dynamic Standing Balance PT LTG, Date Goal Reviewed --  07/09/17  (previous goal achieved 7/9) --    Dynamic Standing Balance PT LTG, Outcome --  --  goal ongoing

## 2017-07-14 NOTE — PROGRESS NOTES
Adult Nutrition  Assessment/PES    Patient Name:  Luis F Plummer  YOB: 1945  MRN: [id number]  Admit Date:  7/5/2017    Assessment Date:  7/14/2017        Reason for Assessment       07/14/17 1537    Reason for Assessment    Reason For Assessment/Visit follow up protocol    Time Spent (min) 20    Diagnosis --   per notes this adm              Nutrition/Diet History       07/14/17 1537    Nutrition/Diet History    Reported/Observed By Patient    Other Patient reports good appetite/PO intake and is drinking all of each Boost Glucose Control he receives on his trays.              Labs/Tests/Procedures/Meds       07/14/17 1538    Labs/Tests/Procedures/Meds    Labs/Tests Review Reviewed                Nutrition Prescription Ordered       07/14/17 1539    Nutrition Prescription PO    Current PO Diet Regular    Supplement Boost Glucose Control    Supplement Frequency 2 times a day    Common Modifiers Cardiac;Consistent Carbohydrate            Evaluation of Received Nutrient/Fluid Intake       07/14/17 1539    PO Evaluation    Number of Meals 5    % PO Intake 95              Problem/Interventions:        Problem 1       07/14/17 1539    Nutrition Diagnoses Problem 1    Problem 1 Nutrition Appropriate for Condition at this Time    Etiology (related to) --   PO intake    Signs/Symptoms (evidenced by) PO Intake    Percent (%) intake recorded 95 %   pt reports he is drinking 100% of Boost Glucose Control supplements (BID)    Over number of meals 5                    Intervention Goal       07/14/17 1540    Intervention Goal    General Nutrition support treatment    PO Maintain intake            Nutrition Intervention       07/14/17 1540    Nutrition Intervention    RD/Tech Action Care plan reviewd;Follow Tx progress              Education/Evaluation       07/14/17 1540    Education    Education Education offered and refused   RD offered to provide diabetic diet education and patient refused. States that he  feels he has enough knowledge and currently manages his T2DM via diet at home.    Monitor/Evaluation    Monitor Per protocol;PO intake;Supplement intake            Electronically signed by:  Kassy Forde RD  07/14/17 3:42 PM

## 2017-07-14 NOTE — PROGRESS NOTES
[facility] Medicine Services  INPATIENT PROGRESS NOTE    Date of Admission: 7/5/2017  Length of Stay: 8  Primary Care Physician: No Known Provider    Subjective   CC: LE edema    HPI:  Doing well.  He voices no complaints.  Says breathing is good. Wants more to eat.  Edema is stable.    Review Of Systems:   Review of Systems   Constitutional: Negative for fever.   Cardiovascular: Positive for chest pain and leg swelling.   Gastrointestinal: Negative for abdominal pain.   Skin: Positive for wound.   All other systems reviewed and are negative.      Objective      Temp:  [97.6 °F (36.4 °C)-98.3 °F (36.8 °C)] 98 °F (36.7 °C)  Heart Rate:  [74-81] 77  Resp:  [16-20] 16  BP: ()/(57-77) 127/67  Physical Exam   Constitutional: He appears well-developed and well-nourished. No distress.   HENT:   Head: Normocephalic.   Eyes: Pupils are equal, round, and reactive to light.   Cardiovascular: Normal rate, regular rhythm and normal heart sounds.    No murmur heard.  Pulmonary/Chest: Effort normal and breath sounds normal. No respiratory distress.   diminshed at the bases   Abdominal: Soft. Bowel sounds are normal. He exhibits no distension. There is no tenderness.   Musculoskeletal: He exhibits no edema.   Neurological: He is alert.   Seems oriented, very [place]  No focal deficits   Skin: Skin is warm and dry.   Some excoriations and scabbing on LEs   Psychiatric: He has a normal mood and affect.   Vitals reviewed.      Results Review:    I have reviewed the labs, radiology results and diagnostic studies.      Results from last 7 days  Lab Units 07/10/17  0708   WBC 10*3/mm3 7.15   HEMOGLOBIN g/dL 11.3*   PLATELETS 10*3/mm3 174       Results from last 7 days  Lab Units 07/13/17  1836 07/13/17  0654   SODIUM mmol/L  --  138   POTASSIUM mmol/L 4.3 3.5   CHLORIDE mmol/L  --  94*   CO2 mmol/L  --  38.0*   BUN mg/dL  --  52*   CREATININE mg/dL  --  1.50*   GLUCOSE mg/dL  --  86   CALCIUM mg/dL  --  9.7          Culture Data: none     Radiology Data:   All imaging reviewed    Echo - EF=20%    I have reviewed the medications.    Assessment/Plan     Problem List  Hospital Problem List     * (Principal)Acute on chronic systolic heart failure    Overview Signed 7/6/2017 11:09 AM by FRANCO Wood     · NYHA Class II         Peripheral edema    Coronary artery disease of native artery of native heart with stable angina pectoris    Overview Addendum 7/6/2017 11:06 AM by FRANCO Wood     · Cardiac catheterization at [facility] (01/26/2015): Multivessel disease.  Data deficit   · Coronary artery bypass with Dr. Rodríguez (01/27/2015): LIMA to LAD.  SVG to OM.  SVG to PDA.  LVEF 20-25%.  · Primary cardiologist is Dr. Bautista         Essential hypertension    Hyperlipidemia LDL goal <70    Overview Addendum 7/6/2017 11:09 AM by FRANCO Wood     · High-intensity statin therapy indicated given presence coronary artery disease           Type 2 diabetes mellitus    CKD (chronic kidney disease)    Peripheral neuropathy    Cellulitis-legs/feet with chronic ulcer- no osteomyelitis on MRI    Ischemic cardiomyopathy    Noncompliance        Assessment/Plan:  - clinically much improved from admission.  Has diuresed up to 18L.  Will resume bumex and monitor renal function.   - EF=20%, on BB, ARB, statin.  Card rec Lifevest, I will make sure arranged.  - will adjust insulin for better control, add some prandial  - patient with noted tick on arrival.  On doxy planned 14d.  Should also cover cellulitis seen on MRI.  - pleural effusion, mostly resolved with diuresis  - reviewed CM notes, working on placement.  Would be ready tomorrow if bed found but I suspect will be here through the weekend.    High complexity.    DVT prophylaxis: Heparin    Dain Cesar MD   07/14/17   1:32 PM

## 2017-07-14 NOTE — PROGRESS NOTES
Continued Stay Note   [facility]     Patient Name: Luis F Plummer  MRN: [id number]  Today's Date: 7/14/2017    Admit Date: 7/5/2017          Discharge Plan       07/14/17 1422    Case Management/Social Work Plan    Plan update    Patient/Family In Agreement With Plan yes    Additional Comments Spoke with patient at bedside regarding options for rehab due to the faciliytof choice not having a bed for him.  Patient chose [facility] first, [facility] and then [facility].  Spoke to Denisha with [facility] who indictes there are no beds until the first of next week but will precert and evaluate.  Spoke to Denisha with [facility] who may have a bed over the weekend.  Awaiting callback.  CM following.  Patient to discharge to rehab via car with family to transport when medically ready.              Discharge Codes     None        Expected Discharge Date and Time     Expected Discharge Date Expected Discharge Time    Jul 17, 2017             Joselin Gong RN

## 2017-07-14 NOTE — PROGRESS NOTES
[facility] Medicine Services  INPATIENT PROGRESS NOTE    Date of Admission: 7/5/2017  Length of Stay: 7  Primary Care Physician: No Known Provider    Subjective-- HPI/Events overnight/ROS/CC- Hospital follow visit.  Detailed ROS not detailed as performed below      No new acute events, ongoing/unchanged L sided chest pain, no SOA. No cough. No F/C.    Objective      Temp:  [97.5 °F (36.4 °C)-98.2 °F (36.8 °C)] 98.1 °F (36.7 °C)  Heart Rate:  [69-82] 80  Resp:  [20] 20  BP: ()/(58-80) 108/66    Physical Exam:  Physical Exam    NAD  RRR  CTAB  Abd soft and NT  LE edema significantly improved, as well as the scattered erythematous lesion on legs. Ulcerations on bilat great toe and 3rd digits are stable.  Nonfocal    Results Review:    I have reviewed the labs, radiology results and diagnostic studies.      Results from last 7 days  Lab Units 07/10/17  0708   WBC 10*3/mm3 7.15   HEMOGLOBIN g/dL 11.3*   PLATELETS 10*3/mm3 174       Results from last 7 days  Lab Units 07/13/17  1836 07/13/17  0654   SODIUM mmol/L  --  138   POTASSIUM mmol/L 4.3 3.5   CO2 mmol/L  --  38.0*   CREATININE mg/dL  --  1.50*   GLUCOSE mg/dL  --  86       Culture Data:  Radiology Data:     I have reviewed the medications.        Assessment/Plan     Assessment/Problem List  This is a 70 yo M with history of CHF and chronic BLE edema, who admits to being noncompliant with medical therapy secondary to financial difficulties, came in with worsening LE edema, SOA, and constant chest pain.    Acute on chronic systolic heart failure/LV dysfunction-EF 15-20%  - management per card/needs life vest at DC  - Had been diuresing well and significantly improved    Peripheral edema  - due to above  - diuresing well     Coronary artery disease of native artery of native heart with stable angina pectoris  - c/o constant L chest pain, suspect MSK, trop elevated but stable  - meds per card     Essential hypertension  - adequately  controlled at this time     Hyperlipidemia LDL goal <70  - Statin     DM (diabetes mellitus)  - A1C 7.9  - Adjust insulin PRN      EDNA on CKD (chronic kidney disease) stage III  - poss from diuretics, monitor, may need to cut back some on diuretics if worsening     Tick bite  - Tick found on patient and removed  - Cont on Doxy x 14 day course     Peripheral neuropathy  - due to DM2     Cellulitis-legs/feet with chronic ulcer- no osteomyelitis on MRI  - likely poor fitting shoes, may need to get diabetic walking shoes at DC if we have them     Noncompliance/Financial difficulties  - SW following      Gen weakness  - multifactorial  - PT/OT      Anemia  - likely chronic  - basic blood work and Guaiac stool     HypoKalemia  - replete     Pleural effusion  - Repeat CXR showed improvement in effusion     Dispo: Medically cleared by cardiology, can go SNF for rehab once bed is available. Ongoing PT/OT while here.       Monet Kent MD   07/13/17   10:05 PM    Please note that portions of this note may have been completed with a voice recognition program. Efforts were made to edit the dictations, but occasionally words are mistranscribed.

## 2017-07-14 NOTE — PROGRESS NOTES
Continued Stay Note  [facility]     Patient Name: Luis F Plummer  MRN: [id number]  Today's Date: 7/14/2017    Admit Date: 7/5/2017          Discharge Plan       07/14/17 0758    Case Management/Social Work Plan    Plan update    Patient/Family In Agreement With Plan yes    Additional Comments Received a call from Joana Hernandez with [facility] advising that they do not have any male meds availabla.  CM to discuss with patient.                Discharge Codes     None        Expected Discharge Date and Time     Expected Discharge Date Expected Discharge Time    Jul 14, 2017             Joselin Gong RN

## 2017-07-14 NOTE — THERAPY TREATMENT NOTE
Acute Care - Physical Therapy Treatment Note  [facility]     Patient Name: Luis F Plummer  : 1945  MRN: [id number]  Today's Date: 2017  Onset of Illness/Injury or Date of Surgery Date: 17  Date of Referral to PT: 17  Referring Physician: FRANCO Irizarry    Admit Date: 2017    Visit Dx:    ICD-10-CM ICD-9-CM   1. Peripheral edema R60.9 782.3   2. Pleural effusion J90 511.9   3. Acute pulmonary edema J81.0 518.4   4. Acute on chronic systolic congestive heart failure I50.23 428.23     428.0   5. Hyperlipidemia LDL goal <70 E78.5 272.4   6. Acute on chronic systolic heart failure I50.23 428.23   7. Coronary artery disease of native artery of native heart with stable angina pectoris I25.118 414.01     413.9   8. Impaired mobility and ADLs Z74.09 799.89   9. Impaired functional mobility, balance, gait, and endurance Z74.09 V49.89     Patient Active Problem List   Diagnosis   • Peripheral edema   • Acute on chronic systolic heart failure   • Coronary artery disease of native artery of native heart with stable angina pectoris   • Essential hypertension   • Hyperlipidemia LDL goal <70   • DM (diabetes mellitus)   • CKD (chronic kidney disease)   • Tick bite   • Peripheral neuropathy   • Cellulitis-legs/feet with chronic ulcer- no osteomyelitis on MRI   • Ischemic cardiomyopathy   • Noncompliance               Adult Rehabilitation Note       17 1141 17 1305 17 1300    Rehab Assessment/Intervention    Discipline physical therapist  -EH occupational therapist  -AC --  -    Document Type therapy note (daily note)  - therapy note (daily note)  -AC --  -    Subjective Information agree to therapy;no complaints  - agree to therapy  -AC --  -EH    Patient Effort, Rehab Treatment adequate  -EH adequate  -AC --  -EH    Symptoms Noted During/After Treatment none  -EH  --  -EH    Precautions/Limitations  fall precautions   no longer has bed bug precautions  -AC --  -EH     Precautions/Limitations, Hearing  hearing impairment, bilaterally  -     Specific Treatment Considerations  --   [place]; does better if he sees your face  when speaking to him  -     Recorded by [] Shauna Todd, PT [] Catalina Cantor, OT [] Shauna Todd, PT    Pain Assessment    Pain Assessment 0-10  - 0-10  -AC --  -    [place] FACES Pain Rating 4  -      Pain Score  4  -AC --  -    Post Pain Score  4  - --  -    Pain Location Foot  - Foot  -AC --  -    Pain Orientation Left;Right  -EH Right;Left  - --  -    Pain Intervention(s) Repositioned;Ambulation/increased activity  - Repositioned  - --  -    Response to Interventions   --  -    Recorded by [] Shauna Todd, PT [] Catalina Cantor, OT [] Shauna Todd PT    Cognitive Assessment/Intervention    Current Cognitive/Communication Assessment impaired   [place]  - --   [place]   - --  -    Orientation Status oriented x 4  -EH oriented x 4  - --  -    Follows Commands/Answers Questions 100% of the time;able to follow single-step instructions  - 100% of the time;needs cueing;needs repetition   delay with reponse at times possibly d/t hearing  - --  -    Personal Safety WNL/WFL  - mild impairment;decreased awareness, need for assist  - --  -    Personal Safety Interventions fall prevention program maintained;nonskid shoes/slippers when out of bed;gait belt  - fall prevention program maintained;gait belt;nonskid shoes/slippers when out of bed  - --  -    Recorded by [] Shauna Todd, PT [] Catalina Cantor, OT [] Shauna Todd, PT    Bed Mobility, Assessment/Treatment    Bed Mobility, Assistive Device bed rails;head of bed elevated  - bed rails  -     Bed Mob, Supine to Sit, [place] verbal cues required;minimum assist (75% patient effort)  - verbal cues required;minimum assist (75% patient effort)  - --  -    Bed Mob, Sit to Supine, [place] not tested  -   --  -EH    Recorded by [] Shauna Todd, PT [AC] Catalina Cantor, OT [] Shauna Todd, PT    Transfer Assessment/Treatment    Transfers, Bed-Chair [place]   --  -    Transfers, Bed-Chair-Bed, Assist Device   --  -    Transfers, Sit-Stand [place] contact guard assist  - verbal cues required;contact guard assist  -AC --  -    Transfers, Stand-Sit [place] verbal cues required;contact guard assist  -EH verbal cues required;contact guard assist  -AC --  -    Transfers, Sit-Stand-Sit, Assist Device rolling walker  - rolling walker  -AC --  -    Transfer, Safety Issues balance decreased during turns;step length decreased  -      Transfer, Impairments pain;strength decreased  -      Transfer, Comment VC for HP when sitting  -      Recorded by [] Shauna Todd, PT [AC] Catalina Cantor, OT [] Shauna Todd, PT    Gait Assessment/Treatment    Gait, [place] Level contact guard assist  -  --  -    Gait, Assistive Device rolling walker  -  --  -    Gait, Distance (Feet) 96  -  --  -    Gait, Gait Pattern Analysis swing-through gait  -  --  -    Gait, Gait Deviations decreased heel strike;stride length decreased;bilateral:;weight-shifting ability decreased;forward flexed posture  -  --  -    Gait, Safety Issues weight-shifting ability decreased;balance decreased during turns;sequencing ability decreased;step length decreased  -  --  -    Gait, Impairments strength decreased;coordination impaired  -  --  -    Recorded by [] Shauna Todd, PT  [] Shauna Todd, PT    Functional Mobility    Functional Mobility- Ind. Level  verbal cues required;contact guard assist  -     Functional Mobility- Device  rolling walker  -     Functional Mobility-Distance (Feet)  90  -     Functional Mobility- Safety Issues  balance decreased during turns  -     Functional Mobility- Comment  pt required 1 standing restbreak d/t pain  -      Recorded by  [AC] Catalina Cantor OT     Lower Body Dressing Assessment/Training    LB Dressing Assess/Train, Clothing Type  donning:;slipper socks  -     LB Dressing Assess/Train, Position  sitting  -     LB Dressing Assess/Train, [place]  supervision required  -     Recorded by  [AC] Catalina Cantor OT     Balance Skills Training    Sitting-Level of Assistance  Distant supervision  -     Standing-Level of Assistance  Contact guard  -     Static Standing Balance Support  assistive device  -     Gait Balance-Level of Assistance  Contact guard  -     Gait Balance Support  assistive device  -     Recorded by  [AC] Catalina Cantor OT     Therapy Exercises    Right Lower Extremity   --  -    Bilateral Lower Extremities AROM:;20 reps;ankle pumps/circles;LAQ;SLR;AAROM:;10 reps;hip abduction/adduction  -  --  -EH    Bilateral Upper Extremity  AROM:;10 reps;elbow flexion/extension;shoulder extension/flexion;shoulder horizontal abd/add  -     Recorded by [] Shauna Todd, PT [AC] Catalina Cantor, OT [] Shauna Todd, PT    Positioning and Restraints    Pre-Treatment Position in bed  - in bed  - --  -EH    Post Treatment Position chair  - chair  - --  -EH    In Chair notified ns;reclined;call light within reach;encouraged to call for assist;exit alarm on  - reclined;call light within reach;encouraged to call for assist;exit alarm on;notified nsg  - --  -EH    Recorded by [] Shauna Todd, PT [AC] Catalina Cantor, OT [] Shauna Todd, PT      07/12/17 1001          Rehab Assessment/Intervention    Discipline physical therapist  -      Document Type therapy note (daily note)  -      Subjective Information agree to therapy;no complaints  -      Patient Effort, Rehab Treatment adequate  -      Symptoms Noted Comment increased foot pain  -      Recorded by [] Shauna Todd, PT      Vital Signs    Pre SpO2 (%) 95  -      O2 Delivery Pre Treatment room  air  -      Post SpO2 (%) 96  -      O2 Delivery Post Treatment room air  -EH      Recorded by [] Shauna Todd, PT      Pain Assessment    Pain Assessment 0-10  -      Pain Score 8  -      Post Pain Score 8  -      Pain Location Foot  -      Pain Orientation Left;Right  -      Pain Intervention(s) Repositioned;Ambulation/increased activity  -      Response to Interventions pt states it worsens during weight bearing through LEs  -      Recorded by [] Shauna Todd, PT      Cognitive Assessment/Intervention    Current Cognitive/Communication Assessment functional  -      Orientation Status oriented x 4  -      Follows Commands/Answers Questions 100% of the time;able to follow single-step instructions  -      Personal Safety decreased awareness, need for assist;decreased awareness, need for safety  -      Personal Safety Interventions fall prevention program maintained;gait belt  -      Recorded by [] Shauna Todd, PT      Bed Mobility, Assessment/Treatment    Bed Mob, Supine to Sit, [place] minimum assist (75% patient effort)  -      Bed Mob, Sit to Supine, [place] not tested  -      Recorded by [] Shauna Todd PT      Transfer Assessment/Treatment    Transfers, Sit-Stand [place] contact guard assist;verbal cues required   VC for safe HP  -      Transfers, Stand-Sit [place] contact guard assist  -      Transfers, Sit-Stand-Sit, Assist Device rolling walker  -      Recorded by [] Shauna Todd, PT      Gait Assessment/Treatment    Gait, [place] Level contact guard assist  -      Gait, Assistive Device rolling walker  -      Gait, Distance (Feet) 90  -      Gait, Gait Pattern Analysis swing-through gait  -      Gait, Gait Deviations decreased heel strike;step length decreased;toe-to-floor clearance decreased;forward flexed posture  -      Gait, Safety Issues weight-shifting ability decreased;step length  decreased;sequencing ability decreased  -EH      Gait, Impairments coordination impaired  -EH      Gait, Comment 2 standing rest breaks; VC for improved posture.   -EH      Recorded by [] Shauna Todd, PT      Therapy Exercises    Bilateral Lower Extremities 15 reps;ankle pumps/circles;LAQ;SLR;hip IR;hip ER;glut sets  -EH      Recorded by [EH] Shauna Todd, PT      Positioning and Restraints    Pre-Treatment Position in bed  -EH      Post Treatment Position chair  -EH      In Chair notified nsg;reclined;call light within reach;encouraged to call for assist;exit alarm on  -EH      Recorded by [] Shauna Todd, PT        User Key  (r) = Recorded By, (t) = Taken By, (c) = Cosigned By    Initials Name Effective Dates    AC Catalina Cantor, OT 06/23/15 -     EH Shauna Todd, PT 06/19/15 -                 IP PT Goals       07/14/17 1314 07/12/17 1415 07/11/17 1420    Transfer Training PT LTG    Transfer Training PT LTG, Outcome goal ongoing  - goal ongoing  - goal ongoing  -EH    Gait Training PT LTG    Gait Training Goal PT LTG, Outcome goal ongoing  - goal ongoing  - goal ongoing  -    Dynamic Standing Balance PT LTG    Dynamic Standing Balance PT LTG, Outcome goal ongoing  -  goal ongoing  -EH      07/10/17 1152 07/09/17 1207 07/06/17 1410    Transfer Training PT LTG    Transfer Training PT LTG, Date Established   07/06/17  -SJ    Transfer Training PT LTG, Time to Achieve   2 wks  -SJ    Transfer Training PT LTG, Activity Type   bed to chair /chair to bed;sit to stand/stand to sit  -SJ    Transfer Training PT LTG, [place] Level   supervision required  -SJ    Transfer Training PT LTG, Assist Device   walker, rolling  -SJ    Transfer Training PT LTG, Outcome goal ongoing  -DAVE (r) KR (t) DAVE (c)  goal ongoing  -SJ    Gait Training PT LTG    Gait Training Goal PT LTG, Date Established   07/06/17  -SJ    Gait Training Goal PT LTG, Time to Achieve   2 wks  -SJ    Gait Training Goal PT  LTG, [place] Level   supervision required  -SJ    Gait Training Goal PT LTG, Assist Device   walker, rolling  -SJ    Gait Training Goal PT LTG, Distance to Achieve   200  -SJ    Gait Training Goal PT LTG, Outcome goal ongoing  -DAVE (r) KR (t) DAVE (c) goal ongoing  -LS goal ongoing  -SJ    Dynamic Standing Balance PT LTG    Dynamic Standing Balance PT LTG, Date Established   07/06/17  -SJ    Dynamic Standing Balance PT LTG, Time to Achieve   2 wks  -SJ    Dynamic Standing Balance PT LTG, [place] Level  conditional independence  -LS contact guard assist  -SJ    Dynamic Standing Balance PT LTG, Assist Device  assistive Device  -LS     Dynamic Standing Balance PT LTG, Date Goal Reviewed  07/09/17   previous goal achieved 7/9  -LS     Dynamic Standing Balance PT LTG, Outcome goal ongoing  -DAVE (r) KR (t) DAVE (c) goal revised  -LS goal ongoing  -SJ      User Key  (r) = Recorded By, (t) = Taken By, (c) = Cosigned By    Initials Name Provider Type    DAVE Abbi Mason, PT Physical Therapist    EH Shauna Todd, PT Physical Therapist    SJ Sakshi Gutierrez, PT Physical Therapist    LS Shae Robledo, PT Physical Therapist    KR Vijaya Shen, PT Student PT Student          Physical Therapy Education     Title: PT OT SLP Therapies (Active)     Topic: Physical Therapy (Active)     Point: Mobility training (Active)    Learning Progress Summary    Learner Readiness Method Response Comment Documented by Status   Patient Acceptance E NR   07/14/17 1314 Active    Acceptance E VU,NR   07/12/17 1414 Done    Acceptance E NR   07/11/17 1420 Active    Acceptance E NR  KR 07/10/17 1152 Active    Acceptance E,D NR  LS 07/09/17 1207 Active    Acceptance E NR  SJ 07/06/17 1412 Active               Point: Home exercise program (Active)    Learning Progress Summary    Learner Readiness Method Response Comment Documented by Status   Patient Acceptance E NR   07/14/17 1314 Active    Acceptance E VU,NR   07/12/17 1414 Done     Acceptance E NR   07/11/17 1420 Active    Acceptance E,D NR  LS 07/09/17 1207 Active               Point: Body mechanics (Active)    Learning Progress Summary    Learner Readiness Method Response Comment Documented by Status   Patient Acceptance E NR   07/14/17 1314 Active    Acceptance E VU,NR   07/12/17 1414 Done    Acceptance E NR   07/11/17 1420 Active    Acceptance E NR  KR 07/10/17 1152 Active    Acceptance E,D NR   07/09/17 1207 Active    Acceptance E NR   07/06/17 1412 Active               Point: Precautions (Active)    Learning Progress Summary    Learner Readiness Method Response Comment Documented by Status   Patient Acceptance E NR   07/14/17 1314 Active    Acceptance E VU,NR   07/12/17 1414 Done    Acceptance E NR   07/11/17 1420 Active    Acceptance E NR   07/10/17 1152 Active    Acceptance E,D NR   07/09/17 1207 Active    Acceptance E NR   07/06/17 1412 Active                      User Key     Initials Effective Dates Name Provider Type Discipline     06/19/15 -  Shauna Todd, PT Physical Therapist PT     06/19/15 -  Sakshi Gutierrez, PT Physical Therapist PT     06/19/15 -  Shae Robledo, PT Physical Therapist PT     05/30/17 -  Vijaya Shen, PT Student PT Student PT                    PT Recommendation and Plan  Anticipated Discharge Disposition: inpatient rehabilitation facility  Planned Therapy Interventions: balance training, bed mobility training, gait training, home exercise program, patient/family education, strengthening, transfer training  PT Frequency: daily  Plan of Care Review  Plan Of Care Reviewed With: patient  Outcome Summary/Follow up Plan: Pt ambulates in [place] x 96 ft this date with continued c/o pain with weightbearing. LE fatigue with ther ex.          Outcome Measures       07/14/17 1141 07/13/17 1305       How much help from another person do you currently need...    Turning from your back to your side while in flat bed without using  bedrails? 4  -EH      Moving from lying on back to sitting on the side of a flat bed without bedrails? 3  -EH      Moving to and from a bed to a chair (including a wheelchair)? 3  -EH      Standing up from a chair using your arms (e.g., wheelchair, bedside chair)? 3  -EH      Climbing 3-5 steps with a railing? 2  -EH      To walk in hospital room? 3  -EH      AM-PAC 6 Clicks Score 18  -EH      How much help from another is currently needed...    Putting on and taking off regular lower body clothing?  3  -AC     Bathing (including washing, rinsing, and drying)  3  -AC     Toileting (which includes using toilet bed pan or urinal)  2  -AC     Putting on and taking off regular upper body clothing  3  -AC     Taking care of personal grooming (such as brushing teeth)  4  -AC     Eating meals  4  -AC     Score  19  -AC     Functional Assessment    Outcome Measure Options AM-PAC 6 Clicks Basic Mobility (PT)  -        User Key  (r) = Recorded By, (t) = Taken By, (c) = Cosigned By    Initials Name Provider Type    AC Catalina Cantor, OT Occupational Therapist     Shauna Todd, PT Physical Therapist           Time Calculation:         PT Charges       07/14/17 1316          Time Calculation    Start Time 1141  -      PT Received On 07/14/17  -      PT Goal Re-Cert Due Date 07/21/17  -      Time Calculation- PT    Total Timed Code Minutes- PT 23 minute(s)  -        User Key  (r) = Recorded By, (t) = Taken By, (c) = Cosigned By    Initials Name Provider Type     Shauna Todd, LEXI Physical Therapist          Therapy Charges for Today     Code Description Service Date Service Provider Modifiers Qty    68183298203 HC GAIT TRAINING EA 15 MIN 7/14/2017 Shauna Todd, PT GP 1    93981683015 HC PT THER PROC EA 15 MIN 7/14/2017 Shauna Todd, PT GP 1          PT G-Codes  Outcome Measure Options: AM-PAC 6 Clicks Basic Mobility (PT)    Shauna Todd, PT  7/14/2017

## 2017-07-14 NOTE — PROGRESS NOTES
Continued Stay Note  [facility]     Patient Name: Luis F Plummer  MRN: [id number]  Today's Date: 7/14/2017    Admit Date: 7/5/2017          Discharge Plan       07/14/17 1551    Case Management/Social Work Plan    Patient/Family In Agreement With Plan yes    Additional Comments Spoke to Arline with the [facility] who will evaluate patient for rehab bed.  No beds available until Monday.  Received call from Denisha with [facility] who can offer the patient a bed but no bed available until Monday.        07/14/17 1422    Case Management/Social Work Plan    Plan update    Patient/Family In Agreement With Plan yes    Additional Comments Spoke with patient at bedside regarding options for rehab due to the faciliytof choice not having a bed for him.  Patient chose [facility] first, [facility] and then [facility].  Spoke to Denisha with [facility] who indictes there are no beds until the first of next week but will precert and evaluate.  Spoke to Denisha with [facility] who may have a bed over the weekend.  Awaiting callback.  CM following.  Patient to discharge to rehab via car with family to transport when medically ready.              Discharge Codes     None        Expected Discharge Date and Time     Expected Discharge Date Expected Discharge Time    Jul 17, 2017             Joselin Gong, CHER

## 2017-07-14 NOTE — PLAN OF CARE
Problem: Patient Care Overview (Adult)  Goal: Plan of Care Review  Outcome: Ongoing (interventions implemented as appropriate)    07/14/17 5778   Coping/Psychosocial Response Interventions   Plan Of Care Reviewed With patient

## 2017-07-15 LAB
ANION GAP SERPL CALCULATED.3IONS-SCNC: 8 MMOL/L (ref 3–11)
BUN BLD-MCNC: 47 MG/DL (ref 9–23)
BUN/CREAT SERPL: 33.6 (ref 7–25)
CALCIUM SPEC-SCNC: 9.2 MG/DL (ref 8.7–10.4)
CHLORIDE SERPL-SCNC: 97 MMOL/L (ref 99–109)
CO2 SERPL-SCNC: 31 MMOL/L (ref 20–31)
CREAT BLD-MCNC: 1.4 MG/DL (ref 0.6–1.3)
GFR SERPL CREATININE-BSD FRML MDRD: 50 ML/MIN/1.73
GLUCOSE BLD-MCNC: 160 MG/DL (ref 70–100)
GLUCOSE BLDC GLUCOMTR-MCNC: 181 MG/DL (ref 70–130)
GLUCOSE BLDC GLUCOMTR-MCNC: 183 MG/DL (ref 70–130)
GLUCOSE BLDC GLUCOMTR-MCNC: 209 MG/DL (ref 70–130)
GLUCOSE BLDC GLUCOMTR-MCNC: 59 MG/DL (ref 70–130)
GLUCOSE BLDC GLUCOMTR-MCNC: 67 MG/DL (ref 70–130)
POTASSIUM BLD-SCNC: 4.5 MMOL/L (ref 3.5–5.5)
SODIUM BLD-SCNC: 136 MMOL/L (ref 132–146)

## 2017-07-15 PROCEDURE — 80048 BASIC METABOLIC PNL TOTAL CA: CPT | Performed by: INTERNAL MEDICINE

## 2017-07-15 PROCEDURE — 99233 SBSQ HOSP IP/OBS HIGH 50: CPT | Performed by: INTERNAL MEDICINE

## 2017-07-15 PROCEDURE — 25010000002 HEPARIN (PORCINE) PER 1000 UNITS: Performed by: NURSE PRACTITIONER

## 2017-07-15 PROCEDURE — 63710000001 INSULIN DETEMIR PER 5 UNITS: Performed by: HOSPITALIST

## 2017-07-15 PROCEDURE — 82962 GLUCOSE BLOOD TEST: CPT

## 2017-07-15 RX ADMIN — HEPARIN SODIUM 5000 UNITS: 5000 INJECTION, SOLUTION INTRAVENOUS; SUBCUTANEOUS at 20:57

## 2017-07-15 RX ADMIN — HEPARIN SODIUM 5000 UNITS: 5000 INJECTION, SOLUTION INTRAVENOUS; SUBCUTANEOUS at 09:32

## 2017-07-15 RX ADMIN — DOXYCYCLINE HYCLATE 100 MG: 100 CAPSULE ORAL at 20:56

## 2017-07-15 RX ADMIN — BUMETANIDE 1 MG: 1 TABLET ORAL at 09:33

## 2017-07-15 RX ADMIN — NYSTATIN: 100000 POWDER TOPICAL at 12:45

## 2017-07-15 RX ADMIN — CARVEDILOL 3.12 MG: 3.12 TABLET, FILM COATED ORAL at 09:33

## 2017-07-15 RX ADMIN — ATORVASTATIN CALCIUM 20 MG: 20 TABLET, FILM COATED ORAL at 20:56

## 2017-07-15 RX ADMIN — INSULIN DETEMIR 5 UNITS: 100 INJECTION, SOLUTION SUBCUTANEOUS at 20:58

## 2017-07-15 RX ADMIN — INSULIN LISPRO 4 UNITS: 100 INJECTION, SOLUTION INTRAVENOUS; SUBCUTANEOUS at 09:32

## 2017-07-15 RX ADMIN — NYSTATIN: 100000 POWDER TOPICAL at 20:58

## 2017-07-15 RX ADMIN — CARVEDILOL 3.12 MG: 3.12 TABLET, FILM COATED ORAL at 20:57

## 2017-07-15 RX ADMIN — INSULIN LISPRO 3 UNITS: 100 INJECTION, SOLUTION INTRAVENOUS; SUBCUTANEOUS at 09:34

## 2017-07-15 RX ADMIN — Medication 5 MG: at 00:21

## 2017-07-15 RX ADMIN — INSULIN LISPRO 2 UNITS: 100 INJECTION, SOLUTION INTRAVENOUS; SUBCUTANEOUS at 17:03

## 2017-07-15 RX ADMIN — INSULIN LISPRO 2 UNITS: 100 INJECTION, SOLUTION INTRAVENOUS; SUBCUTANEOUS at 20:57

## 2017-07-15 RX ADMIN — ACETAMINOPHEN 650 MG: 325 TABLET, FILM COATED ORAL at 05:23

## 2017-07-15 RX ADMIN — LOSARTAN POTASSIUM 50 MG: 50 TABLET, FILM COATED ORAL at 09:33

## 2017-07-15 RX ADMIN — PANTOPRAZOLE SODIUM 40 MG: 40 TABLET, DELAYED RELEASE ORAL at 05:23

## 2017-07-15 RX ADMIN — BUMETANIDE 1 MG: 1 TABLET ORAL at 17:03

## 2017-07-15 RX ADMIN — DOXYCYCLINE HYCLATE 100 MG: 100 CAPSULE ORAL at 09:33

## 2017-07-15 RX ADMIN — ASPIRIN 81 MG: 81 TABLET, COATED ORAL at 09:33

## 2017-07-15 RX ADMIN — LEVOTHYROXINE SODIUM 50 MCG: 50 TABLET ORAL at 05:23

## 2017-07-15 RX ADMIN — Medication 5 MG: at 22:10

## 2017-07-15 NOTE — PLAN OF CARE
Problem: Patient Care Overview (Adult)  Goal: Plan of Care Review  Outcome: Ongoing (interventions implemented as appropriate)    07/15/17 0454   Coping/Psychosocial Response Interventions   Plan Of Care Reviewed With patient   Patient Care Overview   Progress progress toward functional goals as expected   Outcome Evaluation   Outcome Summary/Follow up Plan Pt rested well during the night. No acute events this shift and denies pain at this time. VSS.       Goal: Adult Individualization and Mutuality  Outcome: Ongoing (interventions implemented as appropriate)  Goal: Discharge Needs Assessment  Outcome: Ongoing (interventions implemented as appropriate)    Problem: Fall Risk (Adult)  Goal: Identify Related Risk Factors and Signs and Symptoms  Outcome: Ongoing (interventions implemented as appropriate)  Goal: Absence of Falls  Outcome: Ongoing (interventions implemented as appropriate)    Problem: Skin Integrity Impairment, Risk/Actual (Adult)  Goal: Identify Related Risk Factors and Signs and Symptoms  Outcome: Ongoing (interventions implemented as appropriate)    Problem: Cardiac Output, Decreased (Adult)  Goal: Identify Related Risk Factors and Signs and Symptoms  Outcome: Ongoing (interventions implemented as appropriate)  Goal: Adequate Cardiac Output/Effective Tissue Perfusion  Outcome: Ongoing (interventions implemented as appropriate)

## 2017-07-15 NOTE — PROGRESS NOTES
Continued Stay Note  [facility]     Patient Name: Luis F Plummer  MRN: [id number]  Today's Date: 7/15/2017    Admit Date: 7/5/2017          Discharge Plan       07/15/17 1342    Case Management/Social Work Plan    Plan TBD    Patient/Family In Agreement With Plan yes    Additional Comments Spoke with pt's nurse, Bouchra and she states that per the rep with [employer], pt was previously issued a lifevest and still has to date. Bouchra states pt reports he has returned his lifevest to a doctors office and he doesn't have it currently. Pt also stated that if he still had it, it has been years ago and it would be rotted by now. RN voiced concerns that pt is a hoarder and unable to locate lifevest. Instructed nurse to have pt ask if any family may bring his lifevest to the hospital or RN call family, however, no family contact is listed. If pt's family is unable to locate his lifevest, please notify CM Monday for further eval.               Discharge Codes     None        Expected Discharge Date and Time     Expected Discharge Date Expected Discharge Time    Jul 17, 2017             Deysi Owen

## 2017-07-15 NOTE — PROGRESS NOTES
[facility] Medicine Services  INPATIENT PROGRESS NOTE    Date of Admission: 7/5/2017  Length of Stay: 9  Primary Care Physician: No Known Provider    Subjective   CC: LE edema    HPI:  Says he isn't doing well because he couldn't sleep last night.  No other issues.    Review Of Systems:   Review of Systems   Constitutional: Negative for fever.   Cardiovascular: Positive for chest pain and leg swelling.   Gastrointestinal: Negative for abdominal pain.   Skin: Positive for wound.   All other systems reviewed and are negative.      Objective      Temp:  [97.6 °F (36.4 °C)-98.4 °F (36.9 °C)] 97.6 °F (36.4 °C)  Heart Rate:  [73-77] 73  Resp:  [16-18] 16  BP: (102-145)/(64-84) 145/84  Physical Exam   Constitutional: He appears well-developed and well-nourished. No distress.   Lying in bed   HENT:   Head: Normocephalic.   Eyes: Pupils are equal, round, and reactive to light.   Cardiovascular: Normal rate, regular rhythm and normal heart sounds.    No murmur heard.  Pulmonary/Chest: Effort normal and breath sounds normal. No respiratory distress.   diminshed at the bases   Abdominal: Soft. Bowel sounds are normal. He exhibits no distension. There is no tenderness.   Musculoskeletal: He exhibits no edema.   Neurological: He is alert.   Seems oriented, very [place]  No focal deficits   Skin: Skin is warm and dry.   Some excoriations and scabbing on LEs   Psychiatric: He has a normal mood and affect.   Vitals reviewed.  Exam is unchanged    Results Review:    I have reviewed the labs, radiology results and diagnostic studies.      Results from last 7 days  Lab Units 07/10/17  0708   WBC 10*3/mm3 7.15   HEMOGLOBIN g/dL 11.3*   PLATELETS 10*3/mm3 174       Results from last 7 days  Lab Units 07/15/17  0653   SODIUM mmol/L 136   POTASSIUM mmol/L 4.5   CHLORIDE mmol/L 97*   CO2 mmol/L 31.0   BUN mg/dL 47*   CREATININE mg/dL 1.40*   GLUCOSE mg/dL 160*   CALCIUM mg/dL 9.2       Culture Data: none     Radiology  Data:   All imaging reviewed    Echo - EF=20%    I have reviewed the medications.    Assessment/Plan     Problem List  Hospital Problem List     * (Principal)Acute on chronic systolic heart failure    Overview Signed 7/6/2017 11:09 AM by FRANCO Wood     · NYHA Class II         Peripheral edema    Coronary artery disease of native artery of native heart with stable angina pectoris    Overview Addendum 7/6/2017 11:06 AM by FRANCO Wood     · Cardiac catheterization at [facility] (01/26/2015): Multivessel disease.  Data deficit   · Coronary artery bypass with Dr. Rodríguez (01/27/2015): LIMA to LAD.  SVG to OM.  SVG to PDA.  LVEF 20-25%.  · Primary cardiologist is Dr. Bautista         Essential hypertension    Hyperlipidemia LDL goal <70    Overview Addendum 7/6/2017 11:09 AM by FRANCO Wood     · High-intensity statin therapy indicated given presence coronary artery disease           Type 2 diabetes mellitus    CKD (chronic kidney disease)    Peripheral neuropathy    Cellulitis-legs/feet with chronic ulcer- no osteomyelitis on MRI    Ischemic cardiomyopathy    Noncompliance        Assessment/Plan:  - clinically much improved from admission.  Has diuresed up to 20L based on I/Os.  PO bumex resumed, renal function stable   - EF=20%, on BB, ARB, statin.  Card rec [employer] order placed.  - will increase prandial insulin for better control  - patient with noted tick on arrival.  On doxy planned 14d.  Should also cover cellulitis seen on MRI.  - pleural effusion, mostly resolved with diuresis, no need for thoracentesis at this point  - reviewed CM notes, working on placement, tentative for Monday if all arranged.    High complexity.    DVT prophylaxis: Heparin    Dain Cesar MD   07/15/17   9:24 AM

## 2017-07-16 LAB
GLUCOSE BLDC GLUCOMTR-MCNC: 112 MG/DL (ref 70–130)
GLUCOSE BLDC GLUCOMTR-MCNC: 122 MG/DL (ref 70–130)
GLUCOSE BLDC GLUCOMTR-MCNC: 122 MG/DL (ref 70–130)
GLUCOSE BLDC GLUCOMTR-MCNC: 126 MG/DL (ref 70–130)

## 2017-07-16 PROCEDURE — 97116 GAIT TRAINING THERAPY: CPT

## 2017-07-16 PROCEDURE — 97530 THERAPEUTIC ACTIVITIES: CPT

## 2017-07-16 PROCEDURE — 63710000001 INSULIN DETEMIR PER 5 UNITS: Performed by: HOSPITALIST

## 2017-07-16 PROCEDURE — 25010000002 HEPARIN (PORCINE) PER 1000 UNITS: Performed by: NURSE PRACTITIONER

## 2017-07-16 PROCEDURE — 97110 THERAPEUTIC EXERCISES: CPT

## 2017-07-16 PROCEDURE — 99232 SBSQ HOSP IP/OBS MODERATE 35: CPT | Performed by: INTERNAL MEDICINE

## 2017-07-16 PROCEDURE — 82962 GLUCOSE BLOOD TEST: CPT

## 2017-07-16 RX ADMIN — ASPIRIN 81 MG: 81 TABLET, COATED ORAL at 09:11

## 2017-07-16 RX ADMIN — INSULIN DETEMIR 5 UNITS: 100 INJECTION, SOLUTION SUBCUTANEOUS at 21:17

## 2017-07-16 RX ADMIN — LEVOTHYROXINE SODIUM 50 MCG: 50 TABLET ORAL at 06:46

## 2017-07-16 RX ADMIN — HEPARIN SODIUM 5000 UNITS: 5000 INJECTION, SOLUTION INTRAVENOUS; SUBCUTANEOUS at 21:16

## 2017-07-16 RX ADMIN — HEPARIN SODIUM 5000 UNITS: 5000 INJECTION, SOLUTION INTRAVENOUS; SUBCUTANEOUS at 09:13

## 2017-07-16 RX ADMIN — CARVEDILOL 3.12 MG: 3.12 TABLET, FILM COATED ORAL at 09:11

## 2017-07-16 RX ADMIN — BUMETANIDE 1 MG: 1 TABLET ORAL at 17:45

## 2017-07-16 RX ADMIN — NYSTATIN: 100000 POWDER TOPICAL at 09:14

## 2017-07-16 RX ADMIN — ACETAMINOPHEN 650 MG: 325 TABLET, FILM COATED ORAL at 10:53

## 2017-07-16 RX ADMIN — BUMETANIDE 1 MG: 1 TABLET ORAL at 09:11

## 2017-07-16 RX ADMIN — NYSTATIN: 100000 POWDER TOPICAL at 21:17

## 2017-07-16 RX ADMIN — PANTOPRAZOLE SODIUM 40 MG: 40 TABLET, DELAYED RELEASE ORAL at 06:46

## 2017-07-16 RX ADMIN — DOXYCYCLINE HYCLATE 100 MG: 100 CAPSULE ORAL at 21:16

## 2017-07-16 RX ADMIN — ATORVASTATIN CALCIUM 20 MG: 20 TABLET, FILM COATED ORAL at 21:16

## 2017-07-16 RX ADMIN — DOXYCYCLINE HYCLATE 100 MG: 100 CAPSULE ORAL at 09:13

## 2017-07-16 RX ADMIN — LOSARTAN POTASSIUM 50 MG: 50 TABLET, FILM COATED ORAL at 09:11

## 2017-07-16 RX ADMIN — CARVEDILOL 3.12 MG: 3.12 TABLET, FILM COATED ORAL at 21:18

## 2017-07-16 NOTE — PLAN OF CARE
Problem: Patient Care Overview (Adult)  Goal: Plan of Care Review  Outcome: Ongoing (interventions implemented as appropriate)  Patient with no pain. Good urine output. Clear lungs. [employer] rep called stating they can't give him another vest until he returns previous vest. Pt stated he returned it. Rep going to check into it but she had in her notes he did not.   Goal: Adult Individualization and Mutuality  Outcome: Ongoing (interventions implemented as appropriate)  Goal: Discharge Needs Assessment  Outcome: Ongoing (interventions implemented as appropriate)    Problem: Fall Risk (Adult)  Goal: Identify Related Risk Factors and Signs and Symptoms  Outcome: Ongoing (interventions implemented as appropriate)  Goal: Absence of Falls  Outcome: Ongoing (interventions implemented as appropriate)    Problem: Skin Integrity Impairment, Risk/Actual (Adult)  Goal: Identify Related Risk Factors and Signs and Symptoms  Outcome: Ongoing (interventions implemented as appropriate)    Problem: Cardiac Output, Decreased (Adult)  Goal: Identify Related Risk Factors and Signs and Symptoms  Outcome: Ongoing (interventions implemented as appropriate)  Goal: Adequate Cardiac Output/Effective Tissue Perfusion  Outcome: Ongoing (interventions implemented as appropriate)

## 2017-07-16 NOTE — PLAN OF CARE
Problem: Patient Care Overview (Adult)  Goal: Plan of Care Review  Outcome: Ongoing (interventions implemented as appropriate)    07/16/17 1120   Coping/Psychosocial Response Interventions   Plan Of Care Reviewed With patient   Patient Care Overview   Progress progress toward functional goals as expected   Outcome Evaluation   Outcome Summary/Follow up Plan Pt improved with LBD, bed mobility and transfers. Pt requires encouragement to participate.         Problem: Inpatient Occupational Therapy  Goal: Bed Mobility Goal LTG- OT  Outcome: Ongoing (interventions implemented as appropriate)    07/06/17 1401 07/16/17 1120   Bed Mobility OT LTG   Bed Mobility OT LTG, Date Established 07/06/17 --    Bed Mobility OT LTG, Time to Achieve 1 wk --    Bed Mobility OT LTG, Activity Type all bed mobility --    Bed Mobility OT LTG, McIntosh Level conditional independence --    Bed Mobility OT LTG, Outcome --  goal ongoing       Goal: Transfer Training Goal 1 LTG- OT  Outcome: Ongoing (interventions implemented as appropriate)    07/06/17 1401 07/16/17 1120   Transfer Training OT LTG   Transfer Training OT LTG, Date Established 07/06/17 --    Transfer Training OT LTG, Time to Achieve 1 wk --    Transfer Training OT LTG, Activity Type bed to chair /chair to bed --    Transfer Training OT LTG, [place]osh Level supervision required --    Transfer Training OT LTG, Assist Device other (see comments)  (with appropriate AD) --    Transfer Training OT LTG, Outcome --  goal ongoing       Goal: LB Dressing Goal LTG- OT  Outcome: Ongoing (interventions implemented as appropriate)    07/06/17 1401 07/16/17 1120   LB Dressing OT LTG   LB Dressing Goal OT LTG, Date Established 07/06/17 --    LB Dressing Goal OT LTG, Time to Achieve 1 wk --    LB Dressing Goal OT LTG, Activity Type alvin/doff B socks --    LB Dressing Goal OT LTG, McIntosh Level moderate assist (50% patient effort) --    LB Dressing Goal OT LTG, Adaptive Equipment  reacher;sock-aid --    LB Dressing Goal OT LTG, Outcome --  goal partially met

## 2017-07-16 NOTE — THERAPY TREATMENT NOTE
Acute Care - Occupational Therapy Progress Note  [facility]     Patient Name: Luis F Plummer  : 1945  MRN: [id number]  Today's Date: 2017  Onset of Illness/Injury or Date of Surgery Date: 17  Date of Referral to OT: 17  Referring Physician: FRANCO Irizarry      Admit Date: 2017    Visit Dx:     ICD-10-CM ICD-9-CM   1. Peripheral edema R60.9 782.3   2. Pleural effusion J90 511.9   3. Acute pulmonary edema J81.0 518.4   4. Acute on chronic systolic congestive heart failure I50.23 428.23     428.0   5. Hyperlipidemia LDL goal <70 E78.5 272.4   6. Acute on chronic systolic heart failure I50.23 428.23   7. Coronary artery disease of native artery of native heart with stable angina pectoris I25.118 414.01     413.9   8. Impaired mobility and ADLs Z74.09 799.89   9. Impaired functional mobility, balance, gait, and endurance Z74.09 V49.89     Patient Active Problem List   Diagnosis   • Peripheral edema   • Acute on chronic systolic heart failure   • Coronary artery disease of native artery of native heart with stable angina pectoris   • Essential hypertension   • Hyperlipidemia LDL goal <70   • Type 2 diabetes mellitus   • CKD (chronic kidney disease)   • Peripheral neuropathy   • Cellulitis-legs/feet with chronic ulcer- no osteomyelitis on MRI   • Ischemic cardiomyopathy   • Noncompliance             Adult Rehabilitation Note       17 1042 17 1141 17 1305    Rehab Assessment/Intervention    Discipline occupational therapist  -JR physical therapist  -EH occupational therapist  -AC    Document Type therapy note (daily note)  -JR therapy note (daily note)  - therapy note (daily note)  -AC    Subjective Information no complaints;agree to therapy  -JR agree to therapy;no complaints  - agree to therapy  -AC    Patient Effort, Rehab Treatment adequate  -JR adequate  -EH adequate  -AC    Symptoms Noted During/After Treatment none  -JR none  -EH     Precautions/Limitations fall  precautions  -JR  fall precautions   no longer has bed bug precautions  -AC    Precautions/Limitations, Hearing hearing impairment, bilaterally  -JR  hearing impairment, bilaterally  -AC    Specific Treatment Considerations   --   [place]; does better if he sees your face  when speaking to him  -AC    Recorded by [JR] Hilary Bolden, OT [] Shauna Todd, PT [AC] Catalina Cantor, OT    Vital Signs    Pre Systolic BP Rehab 143  -JR      Pre Treatment Diastolic BP 91  -JR      Pretreatment Heart Rate (beats/min) 74  -JR      Posttreatment Heart Rate (beats/min) 77  -JR      Pre Patient Position Supine  -JR      Intra Patient Position Standing  -JR      Post Patient Position Sitting  -JR      Recorded by [JR] Hilary Bolden, OT      Pain Assessment    Pain Assessment 0-10  -JR 0-10  - 0-10  -AC    Ansari-Simpson FACES Pain Rating  4  -EH     Pain Score 7  -JR  4  -AC    Post Pain Score 7  -JR  4  -AC    Pain Location Leg  -JR Foot  - Foot  -AC    Pain Orientation Right;Left  -JR Left;Right  -EH Right;Left  -AC    Pain Intervention(s) Repositioned;Ambulation/increased activity  -JR Repositioned;Ambulation/increased activity  - Repositioned  -    Response to Interventions Nsg gave meds pt tolerated  -JR      Recorded by [JR] Hilary Bolden, OT [EH] Shauna Todd, PT [AC] Catalina Cantor OT    Cognitive Assessment/Intervention    Current Cognitive/Communication Assessment impaired   [place]  -JR impaired   [place]  - --   [place]   -AC    Orientation Status oriented x 4  -JR oriented x 4  - oriented x 4  -AC    Follows Commands/Answers Questions 100% of the time;able to follow single-step instructions  -% of the time;able to follow single-step instructions  - 100% of the time;needs cueing;needs repetition   delay with reponse at times possibly d/t hearing  -AC    Personal Safety WNL/WFL  -JR WNL/WFL  -EH mild impairment;decreased awareness, need for assist  -AC    Personal Safety Interventions  fall  prevention program maintained;nonskid shoes/slippers when out of bed;gait belt  - fall prevention program maintained;gait belt;nonskid shoes/slippers when out of bed  -    Recorded by [JR] Hilary Bolden, OT [] Shauna Todd, PT [AC] Catalina Cantor OT    Bed Mobility, Assessment/Treatment    Bed Mobility, Assistive Device head of bed elevated  -JR bed rails;head of bed elevated  - bed rails  -    Bed Mob, Supine to Sit, [place] supervision required  -JR verbal cues required;minimum assist (75% patient effort)  - verbal cues required;minimum assist (75% patient effort)  -    Bed Mob, Sit to Supine, [place]  not tested  -     Recorded by [JR] Hilary Bolden, OT [] Shauna Todd, PT [AC] Catalina Cantor OT    Transfer Assessment/Treatment    Transfers, Sit-Stand [place] contact guard assist  - contact guard assist  - verbal cues required;contact guard assist  -AC    Transfers, Stand-Sit [place] contact guard assist  -JR verbal cues required;contact guard assist  - verbal cues required;contact guard assist  -AC    Transfers, Sit-Stand-Sit, Assist Device rolling walker  -JR rolling walker  - rolling walker  -    Transfer, Safety Issues balance decreased during turns;step length decreased  -JR balance decreased during turns;step length decreased  -     Transfer, Impairments pain;strength decreased;impaired balance  -JR pain;strength decreased  -     Transfer, Comment Verbal cues for hand placement wiith transfer  - VC for HP when sitting  -     Recorded by [JR] Hilary Bolden, OT [EH] Shauna Todd, PT [AC] Catalina Cantor OT    Gait Assessment/Treatment    Gait, [place] Level  contact guard assist  -     Gait, Assistive Device  rolling walker  -     Gait, Distance (Feet)  96  -     Gait, Gait Pattern Analysis  swing-through gait  -     Gait, Gait Deviations  decreased heel strike;stride length decreased;bilateral:;weight-shifting  ability decreased;forward flexed posture  -     Gait, Safety Issues  weight-shifting ability decreased;balance decreased during turns;sequencing ability decreased;step length decreased  -     Gait, Impairments  strength decreased;coordination impaired  -     Recorded by  [] Shauna Todd, PT     Functional Mobility    Functional Mobility- Ind. Level minimum assist (75% patient effort)  -JR  verbal cues required;contact guard assist  -    Functional Mobility- Device   rolling walker  -    Functional Mobility-Distance (Feet) --   in  hallway  -JR  90  -AC    Functional Mobility- Safety Issues balance decreased during turns;step length decreased  -JR  balance decreased during turns  -    Functional Mobility- Comment Pt ambulated in [facility] with and without RWx. Pt required CGA with RWx and min A with UE support R. Pt required verbal cues for upright posture, walker safety and forward gaze.  -  pt required 1 standing restbreak d/t pain  -AC    Recorded by [JR] Hilary Bolden, OT  [AC] Catalina Cantor OT    Lower Body Dressing Assessment/Training    LB Dressing Assess/Train, Clothing Type donning:;slipper socks  -  donning:;slipper socks  -    LB Dressing Assess/Train, Position sitting;edge of bed  -  sitting  -    LB Dressing Assess/Train, [place] set up required  -  supervision required  -    LB Dressing Assess/Train, Impairments decreased flexibility;strength decreased  -JR      Recorded by [JR] Hilary Bolden, OT  [AC] Catalina Cantor OT    Toileting Assessment/Training    Toileting Assess/Train, Position standing  -JR      Toileting Assess/Train, Indepen Level dependent (less than 25% patient effort)  -JR      Toileting Assess/Train, Impairments decreased flexibility  -      Toileting Assess/Train, Comment Pt incontinent of bowel, dependent for hygiene.  -JR      Recorded by [JR] Hilary Bolden, OT      Balance Skills Training    Sitting-Level of Assistance Distant  supervision  -JR  Distant supervision  -    Sitting-Balance Support Feet supported  -JR      Standing-Level of Assistance Contact guard  -JR  Contact guard  -AC    Static Standing Balance Support assistive device  -JR  assistive device  -AC    Gait Balance-Level of Assistance Contact guard  -JR  Contact guard  -AC    Gait Balance Support assistive device  -JR  assistive device  -AC    Recorded by [JR] Hilary Bolden, OT  [AC] Catalina Cantor OT    Therapy Exercises    Bilateral Lower Extremities  AROM:;20 reps;ankle pumps/circles;LAQ;SLR;AAROM:;10 reps;hip abduction/adduction  -     Bilateral Upper Extremity AROM:;10 reps;elbow flexion/extension;pronation/supination;shoulder extension/flexion  -JR  AROM:;10 reps;elbow flexion/extension;shoulder extension/flexion;shoulder horizontal abd/add  -AC    Recorded by [JR] Hilary Bolden, OT [] Shauna Todd, PT [AC] Catalina Cantor OT    Positioning and Restraints    Pre-Treatment Position in bed  -JR in bed  - in bed  -    Post Treatment Position chair  - chair  - chair  -    In Chair notified nsg;reclined;call light within reach;encouraged to call for assist;exit alarm on  -JR notified nsg;reclined;call light within reach;encouraged to call for assist;exit alarm on  - reclined;call light within reach;encouraged to call for assist;exit alarm on;notified nsg  -AC    Recorded by [JR] Hilary Bolden, OT [] Shauna Todd, PT [AC] Catalina Cantor OT      User Key  (r) = Recorded By, (t) = Taken By, (c) = Cosigned By    Initials Name Effective Dates    AC Catalina Cantor, OT 06/23/15 -      Shauna Todd, PT 06/19/15 -      Hilary Bolden, OT 06/22/15 -                 OT Goals       07/16/17 1120 07/13/17 1349 07/11/17 1431    Bed Mobility OT LTG    Bed Mobility OT LTG, Outcome goal ongoing  -JR goal ongoing  -AC goal ongoing   progressing  -MM    Transfer Training OT LTG    Transfer Training OT LTG, Outcome goal ongoing  -JR goal  ongoing  -AC goal ongoing   progressing  -MM    LB Dressing OT LTG    LB Dressing Goal OT LTG, Outcome goal partially met  -JR goal partially met   met to don socks  -AC goal partially met   supervision for socks  -MM      07/09/17 1253 07/06/17 1401       Bed Mobility OT LTG    Bed Mobility OT LTG, Date Established  07/06/17  -JR     Bed Mobility OT LTG, Time to Achieve  1 wk  -JR     Bed Mobility OT LTG, Activity Type  all bed mobility  -JR     Bed Mobility OT LTG, [place] Level  conditional independence  -JR     Bed Mobility OT LTG, Outcome goal ongoing  -CL      Transfer Training OT LTG    Transfer Training OT LTG, Date Established  07/06/17  -JR     Transfer Training OT LTG, Time to Achieve  1 wk  -JR     Transfer Training OT LTG, Activity Type  bed to chair /chair to bed  -JR     Transfer Training OT LTG, [place] Level  supervision required  -JR     Transfer Training OT LTG, Assist Device  other (see comments)   with appropriate AD  -JR     Transfer Training OT LTG, Outcome goal ongoing  -CL      LB Dressing OT LTG    LB Dressing Goal OT LTG, Date Established  07/06/17  -JR     LB Dressing Goal OT LTG, Time to Achieve  1 wk  -JR     LB Dressing Goal OT LTG, Activity Type  alvin/doff B socks  -JR     LB Dressing Goal OT LTG, [place] Level  moderate assist (50% patient effort)  -JR     LB Dressing Goal OT LTG, Adaptive Equipment  reacher;sock-aid  -JR     LB Dressing Goal OT LTG, Outcome goal ongoing  -CL        User Key  (r) = Recorded By, (t) = Taken By, (c) = Cosigned By    Initials Name Provider Type    AC Catalina Cantor, OT Occupational Therapist    JOSELO Duarte, OT Occupational Therapist    JR Hilary Bolden, OT Occupational Therapist    TODD Gautam, OT Occupational Therapist          Occupational Therapy Education     Title: PT OT SLP Therapies (Active)     Topic: Occupational Therapy (Active)     Point: ADL training (Active)    Description: Instruct learner(s) on proper  safety adaptation and remediation techniques during self care or transfers.   Instruct in proper use of assistive devices.    Learning Progress Summary    Learner Readiness Method Response Comment Documented by Status   Patient Acceptance E NR Educated pt on appropriate safety precautions, transfer techniques and guided pt through HEP.  07/16/17 1119 Active    Acceptance E NR safety with ADLs and use of walker AC 07/13/17 1349 Active    Acceptance E,TB VU,NR Guided through HEP, educated on precautions and improved mechanics for ADL/fnxl mob/tsf.  07/11/17 1430 Done    Acceptance E,D NR Pt educated on appropriate safety precautions, t/f techniques, and benefits of therapy.  07/09/17 1252 Active    Acceptance E NR Educated pt on appropriate transfer techniques, appropiate safety precautions, role of therapy and benefits of activity.  07/06/17 1359 Active               Point: Home exercise program (Active)    Description: Instruct learner(s) on appropriate technique for monitoring, assisting and/or progressing therapeutic exercises/activities.    Learning Progress Summary    Learner Readiness Method Response Comment Documented by Status   Patient Acceptance E NR Educated pt on appropriate safety precautions, transfer techniques and guided pt through HEP.  07/16/17 1119 Active    Acceptance E,TB VU,NR Guided through HEP, educated on precautions and improved mechanics for ADL/fnxl mob/tsf.  07/11/17 1430 Done               Point: Precautions (Done)    Description: Instruct learner(s) on prescribed precautions during self-care and functional transfers.    Learning Progress Summary    Learner Readiness Method Response Comment Documented by Status   Patient Acceptance E,TB VU,NR Guided through HEP, educated on precautions and improved mechanics for ADL/fnxl mob/tsf.  07/11/17 1430 Done    Acceptance E,D NR Pt educated on appropriate safety precautions, t/f techniques, and benefits of therapy.  07/09/17 1252  Active               Point: Body mechanics (Done)    Description: Instruct learner(s) on proper positioning and spine alignment during self-care, functional mobility activities and/or exercises.    Learning Progress Summary    Learner Readiness Method Response Comment Documented by Status   Patient Acceptance E,TB VU,NR Guided through HEP, educated on precautions and improved mechanics for ADL/fnxl mob/tsf.  07/11/17 1430 Done    Acceptance E,D NR Pt educated on appropriate safety precautions, t/f techniques, and benefits of therapy.  07/09/17 1252 Active                      User Key     Initials Effective Dates Name Provider Type Discipline    AC 06/23/15 -  Catalina Cantor, OT Occupational Therapist OT    MM 06/22/15 -  Brenda Duarte, OT Occupational Therapist OT    JR 06/22/15 -  Hilary Bolden, OT Occupational Therapist OT    CL 06/08/16 -  Basilia Gautam, OT Occupational Therapist OT                  OT Recommendation and Plan  Anticipated Equipment Needs At Discharge: bedside commode, tub bench  Anticipated Discharge Disposition: inpatient rehabilitation facility  Planned Therapy Interventions: ADL retraining, adaptive equipment training, balance training, bed mobility training, transfer training  Therapy Frequency: daily  Plan of Care Review  Plan Of Care Reviewed With: patient  Progress: progress toward functional goals as expected  Outcome Summary/Follow up Plan: Pt improved with LBD, bed mobility and transfers. Pt requires encouragement to participate.        Outcome Measures       07/16/17 1042 07/14/17 1141 07/13/17 1305    How much help from another person do you currently need...    Turning from your back to your side while in flat bed without using bedrails?  4  -EH     Moving from lying on back to sitting on the side of a flat bed without bedrails?  3  -EH     Moving to and from a bed to a chair (including a wheelchair)?  3  -EH     Standing up from a chair using your arms (e.g.,  wheelchair, bedside chair)?  3  -EH     Climbing 3-5 steps with a railing?  2  -EH     To walk in hospital room?  3  -EH     AM-PAC 6 Clicks Score  18  -EH     How much help from another is currently needed...    Putting on and taking off regular lower body clothing? 3  -JR  3  -AC    Bathing (including washing, rinsing, and drying) 3  -JR  3  -AC    Toileting (which includes using toilet bed pan or urinal) 1  -JR  2  -AC    Putting on and taking off regular upper body clothing 3  -JR  3  -AC    Taking care of personal grooming (such as brushing teeth) 4  -JR  4  -AC    Eating meals 4  -JR  4  -AC    Score 18  -JR  19  -AC    Functional Assessment    Outcome Measure Options AM-PAC 6 Clicks Daily Activity (OT)  - AM-[facility] 6 Clicks Basic Mobility (PT)  -       User Key  (r) = Recorded By, (t) = Taken By, (c) = Cosigned By    Initials Name Provider Type     Catalina Cantor, OT Occupational Therapist     Shauna Todd, PT Physical Therapist     Hilary Bolden, OT Occupational Therapist           Time Calculation:         Time Calculation- OT       07/16/17 1122          Time Calculation- OT    OT Start Time 1042  -      Total Timed Code Minutes- OT 15 minute(s)  -      OT Received On 07/16/17  -      OT Goal Re-Cert Due Date 07/26/17  -        User Key  (r) = Recorded By, (t) = Taken By, (c) = Cosigned By    Initials Name Provider Type     Hilary Bolden, OT Occupational Therapist           Therapy Charges for Today     Code Description Service Date Service Provider Modifiers Qty    [id number]  OT THERAPEUTIC ACT EA 15 MIN 7/16/2017 Hilary Bolden OT GO 1               Hilary Bolden OT  7/16/2017

## 2017-07-16 NOTE — PLAN OF CARE
Problem: Patient Care Overview (Adult)  Goal: Plan of Care Review  Outcome: Ongoing (interventions implemented as appropriate)    07/16/17 0514   Coping/Psychosocial Response Interventions   Plan Of Care Reviewed With patient   Patient Care Overview   Progress progress toward functional goals as expected   Outcome Evaluation   Outcome Summary/Follow up Plan Pt restless and awake though much of the night. Denies pain and had no acute episodes during this shift. VSS.        Goal: Adult Individualization and Mutuality  Outcome: Ongoing (interventions implemented as appropriate)  Goal: Discharge Needs Assessment  Outcome: Ongoing (interventions implemented as appropriate)    Problem: Fall Risk (Adult)  Goal: Identify Related Risk Factors and Signs and Symptoms  Outcome: Ongoing (interventions implemented as appropriate)  Goal: Absence of Falls  Outcome: Ongoing (interventions implemented as appropriate)    Problem: Skin Integrity Impairment, Risk/Actual (Adult)  Goal: Identify Related Risk Factors and Signs and Symptoms  Outcome: Ongoing (interventions implemented as appropriate)    Problem: Cardiac Output, Decreased (Adult)  Goal: Identify Related Risk Factors and Signs and Symptoms  Outcome: Ongoing (interventions implemented as appropriate)  Goal: Adequate Cardiac Output/Effective Tissue Perfusion  Outcome: Ongoing (interventions implemented as appropriate)

## 2017-07-16 NOTE — PROGRESS NOTES
"    [facility] Medicine Services  INPATIENT PROGRESS NOTE    Date of Admission: 7/5/2017  Length of Stay: 10  Primary Care Physician: No Known Provider    Subjective   CC: LE edema    HPI:  No problems overnight.  Eating well, strength is ok.  No complaints.  He says he turned in previous lifevest to \"somebody years ago\"    Review Of Systems:   Review of Systems   Constitutional: Negative for fever.   Cardiovascular: Positive for chest pain and leg swelling.   Gastrointestinal: Negative for abdominal pain.   Skin: Positive for wound.   All other systems reviewed and are negative.      Objective      Temp:  [97.7 °F (36.5 °C)-98.5 °F (36.9 °C)] 98.1 °F (36.7 °C)  Heart Rate:  [65-74] 70  Resp:  [16-18] 16  BP: (100-143)/(60-91) 143/91  Physical Exam   Constitutional: He appears well-developed and well-nourished. No distress.   Lying in bed   HENT:   Head: Normocephalic.   Eyes: Pupils are equal, round, and reactive to light.   Cardiovascular: Normal rate, regular rhythm and normal heart sounds.    No murmur heard.  Pulmonary/Chest: Effort normal and breath sounds normal. No respiratory distress.   diminshed at the bases   Abdominal: Soft. Bowel sounds are normal. He exhibits no distension. There is no tenderness.   Musculoskeletal: He exhibits no edema.   Neurological: He is alert.   Seems oriented, very [place]  No focal deficits   Skin: Skin is warm and dry.   Some excoriations and scabbing on LEs   Psychiatric: He has a normal mood and affect.   Vitals reviewed.  Exam is unchanged    Results Review:    I have reviewed the labs, radiology results and diagnostic studies.      Results from last 7 days  Lab Units 07/10/17  0708   WBC 10*3/mm3 7.15   HEMOGLOBIN g/dL 11.3*   PLATELETS 10*3/mm3 174       Results from last 7 days  Lab Units 07/15/17  0653   SODIUM mmol/L 136   POTASSIUM mmol/L 4.5   CHLORIDE mmol/L 97*   CO2 mmol/L 31.0   BUN mg/dL 47*   CREATININE mg/dL 1.40*   GLUCOSE mg/dL 160*   "   CALCIUM mg/dL 9.2       Culture Data: none     Radiology Data:   All imaging reviewed    Echo - EF=20%    I have reviewed the medications.    Assessment/Plan     Problem List  Hospital Problem List     * (Principal)Acute on chronic systolic heart failure    Overview Signed 7/6/2017 11:09 AM by FRANCO Wood     · NYHA Class II         Peripheral edema    Coronary artery disease of native artery of native heart with stable angina pectoris    Overview Addendum 7/6/2017 11:06 AM by FRANCO Wood     · Cardiac catheterization at [facility] (01/26/2015): Multivessel disease.  Data deficit   · Coronary artery bypass with Dr. Rodríguez (01/27/2015): LIMA to LAD.  SVG to OM.  SVG to PDA.  LVEF 20-25%.  · Primary cardiologist is Dr. aButista         Essential hypertension    Hyperlipidemia LDL goal <70    Overview Addendum 7/6/2017 11:09 AM by FRANCO Wood     · High-intensity statin therapy indicated given presence coronary artery disease           Type 2 diabetes mellitus    CKD (chronic kidney disease)    Peripheral neuropathy    Cellulitis-legs/feet with chronic ulcer- no osteomyelitis on MRI    Ischemic cardiomyopathy    Noncompliance        Assessment/Plan:  - clinically much improved from admission.  Has diuresed up to 23L based on I/Os.  PO bumex resumed, renal function stable, will chek in AM  - EF=20%, on BB, ARB, statin.    - Order placed for lifevest, however the rep says the patient has had one previously and never turned in.  Attempting to call family to try to find.  Plan is to f/u with [place] cardiologist to determine need for ICD in future.  - had some hypoglycemia yesterday, will decrease prandial insulin today  - patient with noted tick on arrival.  On doxy planned 14d.  Should also cover cellulitis seen on MRI.  - pleural effusion, mostly resolved with diuresis, no need for thoracentesis at this point  - reviewed CM notes, working on placement,  tentative for Monday if all arranged including plan for lifevest.    High complexity.    DVT prophylaxis: Heparin    Dain Cesar MD   07/16/17   10:05 AM

## 2017-07-16 NOTE — PLAN OF CARE
Problem: Patient Care Overview (Adult)  Goal: Plan of Care Review  Outcome: Ongoing (interventions implemented as appropriate)    07/16/17 1404   Coping/Psychosocial Response Interventions   Plan Of Care Reviewed With patient   Patient Care Overview   Progress progress toward functional goals as expected   Outcome Evaluation   Outcome Summary/Follow up Plan Pt requires encouragement to participate. Pt amb 150 ft w/ CGA/min A. Recommend gait w/ RW for improved safety.          Problem: Inpatient Physical Therapy  Goal: Transfer Training Goal 1 LTG- PT  Outcome: Ongoing (interventions implemented as appropriate)    07/06/17 1410 07/16/17 1404   Transfer Training PT LTG   Transfer Training PT LTG, Date Established 07/06/17 --    Transfer Training PT LTG, Time to Achieve 2 wks --    Transfer Training PT LTG, Activity Type bed to chair /chair to bed;sit to stand/stand to sit --    Transfer Training PT LTG, [place] Level supervision required --    Transfer Training PT LTG, Assist Device walker, rolling --    Transfer Training PT LTG, Outcome --  goal ongoing       Goal: Gait Training Goal LTG- PT  Outcome: Ongoing (interventions implemented as appropriate)    07/06/17 1410 07/16/17 1404   Gait Training PT LTG   Gait Training Goal PT LTG, Date Established 07/06/17 --    Gait Training Goal PT LTG, Time to Achieve 2 wks --    Gait Training Goal PT LTG, [place] Level supervision required --    Gait Training Goal PT LTG, Assist Device walker, rolling --    Gait Training Goal PT LTG, Distance to Achieve 200 --    Gait Training Goal PT LTG, Outcome --  goal ongoing       Goal: Dynamic Standing Balance Goal LTG- PT  Outcome: Ongoing (interventions implemented as appropriate)    07/06/17 1410 07/09/17 1207 07/16/17 1404   Dynamic Standing Balance PT LTG   Dynamic Standing Balance PT LTG, Date Established 07/06/17 --  --    Dynamic Standing Balance PT LTG, Time to Achieve 2 wks --  --    Dynamic Standing Balance PT LTG,  [place] Level --  conditional independence --    Dynamic Standing Balance PT LTG, Assist Device --  assistive Device --    Dynamic Standing Balance PT LTG, Date Goal Reviewed --  07/09/17  (previous goal achieved 7/9) --    Dynamic Standing Balance PT LTG, Outcome --  --  goal ongoing

## 2017-07-16 NOTE — THERAPY TREATMENT NOTE
Acute Care - Physical Therapy Treatment Note  [facility]     Patient Name: Luis F Plummer  : 1945  MRN: [id number]  Today's Date: 2017  Onset of Illness/Injury or Date of Surgery Date: 17  Date of Referral to PT: 17  Referring Physician: FRANCO Irizarry    Admit Date: 2017    Visit Dx:    ICD-10-CM ICD-9-CM   1. Peripheral edema R60.9 782.3   2. Pleural effusion J90 511.9   3. Acute pulmonary edema J81.0 518.4   4. Acute on chronic systolic congestive heart failure I50.23 428.23     428.0   5. Hyperlipidemia LDL goal <70 E78.5 272.4   6. Acute on chronic systolic heart failure I50.23 428.23   7. Coronary artery disease of native artery of native heart with stable angina pectoris I25.118 414.01     413.9   8. Impaired mobility and ADLs Z74.09 799.89   9. Impaired functional mobility, balance, gait, and endurance Z74.09 V49.89     Patient Active Problem List   Diagnosis   • Peripheral edema   • Acute on chronic systolic heart failure   • Coronary artery disease of native artery of native heart with stable angina pectoris   • Essential hypertension   • Hyperlipidemia LDL goal <70   • Type 2 diabetes mellitus   • CKD (chronic kidney disease)   • Peripheral neuropathy   • Cellulitis-legs/feet with chronic ulcer- no osteomyelitis on MRI   • Ischemic cardiomyopathy   • Noncompliance               Adult Rehabilitation Note       17 1043 17 1042 17 1141    Rehab Assessment/Intervention    Discipline physical therapist  -MB occupational therapist  -JR physical therapist  -EH    Document Type therapy note (daily note)  -MB therapy note (daily note)  -JR therapy note (daily note)  -EH    Subjective Information no complaints;agree to therapy  -MB no complaints;agree to therapy  -JR agree to therapy;no complaints  -EH    Patient Effort, Rehab Treatment adequate  -MB adequate  -JR adequate  -EH    Symptoms Noted During/After Treatment none  -MB none  -JR none  -EH     Precautions/Limitations fall precautions  -MB fall precautions  -JR     Precautions/Limitations, Hearing hearing aid, bilaterally   Pt [place].  -MB hearing impairment, bilaterally  -JR     Recorded by [MB] Arlene Nixon, PT [JR] Hilary Bolden, OT [EH] Shauna Todd, PT    Vital Signs    Pre Systolic BP Rehab 143  -  -JR     Pre Treatment Diastolic BP 91  -MB 91  -JR     Pretreatment Heart Rate (beats/min) 74  -MB 74  -JR     Posttreatment Heart Rate (beats/min) 77  -MB 77  -JR     Pre Patient Position Supine  -MB Supine  -JR     Intra Patient Position Standing  -MB Standing  -JR     Post Patient Position Sitting  -MB Sitting  -JR     Recorded by [MB] Arlene Nixon, PT [JR] Hilary Bolden, OT     Pain Assessment    Pain Assessment 0-10  -MB 0-10  -JR 0-10  -EH    Ansari-Simpson FACES Pain Rating   4  -EH    Pain Score 7  -MB 7  -JR     Post Pain Score 7  -MB 7  -JR     Pain Location Leg  -MB Leg  -JR Foot  -EH    Pain Orientation Right;Left  -MB Right;Left  -JR Left;Right  -EH    Pain Intervention(s) Medication (See MAR);Repositioned;Ambulation/increased activity   Nsg premedicated.  -MB Repositioned;Ambulation/increased activity  -JR Repositioned;Ambulation/increased activity  -    Response to Interventions Tolerated  -MB Nsg gave meds pt tolerated  -JR     Recorded by [MB] Arlene Nixon, PT [JR] Hilary Bolden, OT [EH] Shauna Todd, PT    Cognitive Assessment/Intervention    Current Cognitive/Communication Assessment impaired  -MB impaired   [place]  -JR impaired   [place]  -    Orientation Status oriented x 4  -MB oriented x 4  -JR oriented x 4  -EH    Follows Commands/Answers Questions 100% of the time;able to follow single-step instructions;needs increased time;needs cueing  -% of the time;able to follow single-step instructions  -% of the time;able to follow single-step instructions  -    Personal Safety mild impairment;decreased awareness, need for assist;decreased  awareness, need for safety;decreased insight to deficits  -MB WNL/WFL  -JR WNL/WFL  -    Personal Safety Interventions fall prevention program maintained;gait belt;muscle strengthening facilitated;nonskid shoes/slippers when out of bed  -MB  fall prevention program maintained;nonskid shoes/slippers when out of bed;gait belt  -    Recorded by [MB] Arlene Nixon, PT [JR] Hilary Bolden, OT [] Shauna Todd, PT    Bed Mobility, Assessment/Treatment    Bed Mobility, Assistive Device head of bed elevated  -MB head of bed elevated  - bed rails;head of bed elevated  -    Bed Mob, Supine to Sit, [place] supervision required;verbal cues required  -MB supervision required  - verbal cues required;minimum assist (75% patient effort)  -    Bed Mob, Sit to Supine, [place] not tested  -MB  not tested  -    Bed Mobility, Safety Issues decreased use of legs for bridging/pushing;decreased use of arms for pushing/pulling  -MB      Bed Mobility, Impairments strength decreased  -MB      Bed Mobility, Comment VCs to scoot forward to EOB to place feet on floor.  -MB      Recorded by [MB] Arlene Nixon, PT [JR] Hilary Bolden, OT [] Shauna Todd, PT    Transfer Assessment/Treatment    Transfers, Sit-Stand [place] contact guard assist;verbal cues required  -MB contact guard assist  - contact guard assist  -    Transfers, Stand-Sit [place] contact guard assist;verbal cues required  -MB contact guard assist  -JR verbal cues required;contact guard assist  -    Transfers, Sit-Stand-Sit, Assist Device rolling walker  -MB rolling walker  -JR rolling walker  -    Transfer, Safety Issues balance decreased during turns;step length decreased;weight-shifting ability decreased;loses balance backward  -MB balance decreased during turns;step length decreased  -JR balance decreased during turns;step length decreased  -    Transfer, Impairments strength decreased;impaired  "balance;pain;postural control impaired  -MB pain;strength decreased;impaired balance  -JR pain;strength decreased  -    Transfer, Comment VCs for sequencing and safe hand placement.  -MB Verbal cues for hand placement wiith transfer  -JR VC for HP when sitting  -    Recorded by [MB] Arlene Nixon, PT [JR] Hilary Bolden, OT [] Shauna Todd, PT    Gait Assessment/Treatment    Gait, [place] Level minimum assist (75% patient effort);verbal cues required;nonverbal cues required (demo/gesture)  -MB  contact guard assist  -    Gait, Assistive Device rolling walker  -MB  rolling walker  -    Gait, Distance (Feet) 150  -MB  96  -    Gait, Gait Pattern Analysis swing-through gait  -MB  swing-through gait  -    Gait, Gait Deviations narrow base;forward flexed posture;toe-to-floor clearance decreased;[place] decreased;bilateral:;step length decreased  -MB  decreased heel strike;stride length decreased;bilateral:;weight-shifting ability decreased;forward flexed posture  -    Gait, Safety Issues balance decreased during turns;sequencing ability decreased;step length decreased;weight-shifting ability decreased  -MB  weight-shifting ability decreased;balance decreased during turns;sequencing ability decreased;step length decreased  -    Gait, Impairments strength decreased;impaired balance;coordination impaired;pain  -MB  strength decreased;coordination impaired  -    Gait, Comment Pt amb 75 ft w/ RW and CGA w/ VCs for upright posture, forward gaze, and dec UE WBing.  Pt amb 75 ft w/out AD w/ min A w/ + veering.  Pt resistant to VCs/instruction for increased safety w/ gait.  Pt stated, \"I've been walking this way for 60 years.  I'm not going to change it now.\"    -MB      Recorded by [MB] Arlene Nixon, PT  [EH] Shauna Todd, PT    Functional Mobility    Functional Mobility- Ind. Level  minimum assist (75% patient effort)  -JR     Functional Mobility-Distance (Feet)  --   in  " hallway  -JR     Functional Mobility- Safety Issues  balance decreased during turns;step length decreased  -JR     Functional Mobility- Comment  Pt ambulated in hallway with and without RWx. Pt required CGA with RWx and min A with UE support R. Pt required verbal cues for upright posture, walker safety and forward gaze.  -JR     Recorded by  [JR] Hilary Bolden, OT     Lower Body Dressing Assessment/Training    LB Dressing Assess/Train, Clothing Type  donning:;slipper socks  -JR     LB Dressing Assess/Train, Position  sitting;edge of bed  -JR     LB Dressing Assess/Train, [place]  set up required  -JR     LB Dressing Assess/Train, Impairments  decreased flexibility;strength decreased  -JR     Recorded by  [JR] Hilary Bolden, OT     Toileting Assessment/Training    Toileting Assess/Train, Position  standing  -JR     Toileting Assess/Train, Indepen Level  dependent (less than 25% patient effort)  -JR     Toileting Assess/Train, Impairments  decreased flexibility  -JR     Toileting Assess/Train, Comment  Pt incontinent of bowel, dependent for hygiene.  -JR     Recorded by  [JR] Hilary Bolden, OT     Motor Skills/Interventions    Additional Documentation Balance Skills Training (Group)  -MB      Recorded by [MB] Arlene Nixon, PT      Balance Skills Training    Sitting-Level of Assistance  Distant supervision  -JR     Sitting-Balance Support  Feet supported  -JR     Standing-Level of Assistance Contact guard  -MB Contact guard  -JR     Static Standing Balance Support assistive device  -MB assistive device  -JR     Standing-Balance Activities Weight Shift R-L;Weight Shift A-P;Forward lean   for dep hygiene; pt incontinent upon arrival.  -MB      Gait Balance-Level of Assistance Contact guard  -MB Contact guard  -JR     Gait Balance Support assistive device  -MB assistive device  -JR     Gait Balance Activities scanning environment R/L;side-stepping  -MB      Recorded by [MB] Arlene Nixon, PT [JR]  Hilary Bolden, OT     Therapy Exercises    Bilateral Lower Extremities AROM:;15 reps;sitting;ankle pumps/circles;heel raises;hip abduction/adduction;hip flexion;LAQ  -MB  AROM:;20 reps;ankle pumps/circles;LAQ;SLR;AAROM:;10 reps;hip abduction/adduction  -    Bilateral Upper Extremity  AROM:;10 reps;elbow flexion/extension;pronation/supination;shoulder extension/flexion  -JR     Recorded by [MB] Arlene Nixon, PT [JR] Hilary Bolden, OT [] Shauna Todd, PT    Positioning and Restraints    Pre-Treatment Position in bed  -MB in bed  -JR in bed  -    Post Treatment Position chair  -MB chair  -JR chair  -    In Chair notified nsg;reclined;call light within reach;encouraged to call for assist;exit alarm on;legs elevated  -MB notified nsg;reclined;call light within reach;encouraged to call for assist;exit alarm on  - notified nsg;reclined;call light within reach;encouraged to call for assist;exit alarm on  -    Recorded by [MB] Arlene Nixon, PT [JR] Hilary Bolden, OT [] Shauna Todd, PT      User Key  (r) = Recorded By, (t) = Taken By, (c) = Cosigned By    Initials Name Effective [facility] Shauna Todd, PT 06/19/15 -     JR Hilary Bolden, OT 06/22/15 -     GAIL Nixon, PT 03/14/16 -                 IP PT Goals       07/16/17 1404 07/14/17 1314 07/12/17 1415    Transfer Training PT LTG    Transfer Training PT LTG, Outcome goal ongoing  -MB goal ongoing  - goal ongoing  -    Gait Training PT LTG    Gait Training Goal PT LTG, Outcome goal ongoing  -MB goal ongoing  - goal ongoing  -    Dynamic Standing Balance PT LTG    Dynamic Standing Balance PT LTG, Outcome goal ongoing  -MB goal ongoing  -       07/11/17 1420 07/10/17 1152 07/09/17 1207    Transfer Training PT LTG    Transfer Training PT LTG, Outcome goal ongoing  - goal ongoing  -DAVE (r) KR (t) DAVE (c)     Gait Training PT LTG    Gait Training Goal PT LTG, Outcome goal ongoing  -EH goal ongoing   -DAVE (r) KR (t) DAVE (c) goal ongoing  -LS    Dynamic Standing Balance PT LTG    Dynamic Standing Balance PT LTG, [place] Level   conditional independence  -LS    Dynamic Standing Balance PT LTG, Assist Device   assistive Device  -LS    Dynamic Standing Balance PT LTG, Date Goal Reviewed   07/09/17   previous goal achieved 7/9  -LS    Dynamic Standing Balance PT LTG, Outcome goal ongoing  -EH goal ongoing  -DAVE (r) KR (t) DAVE (c) goal revised  -LS      07/06/17 1410          Transfer Training PT LTG    Transfer Training PT LTG, Date Established 07/06/17  -SJ      Transfer Training PT LTG, Time to Achieve 2 wks  -SJ      Transfer Training PT LTG, Activity Type bed to chair /chair to bed;sit to stand/stand to sit  -SJ      Transfer Training PT LTG, [place] Level supervision required  -SJ      Transfer Training PT LTG, Assist Device walker, rolling  -SJ      Transfer Training PT LTG, Outcome goal ongoing  -SJ      Gait Training PT LTG    Gait Training Goal PT LTG, Date Established 07/06/17  -SJ      Gait Training Goal PT LTG, Time to Achieve 2 wks  -SJ      Gait Training Goal PT LTG, [place] Level supervision required  -SJ      Gait Training Goal PT LTG, Assist Device walker, rolling  -SJ      Gait Training Goal PT LTG, Distance to Achieve 200  -SJ      Gait Training Goal PT LTG, Outcome goal ongoing  -SJ      Dynamic Standing Balance PT LTG    Dynamic Standing Balance PT LTG, Date Established 07/06/17  -SJ      Dynamic Standing Balance PT LTG, Time to Achieve 2 wks  -SJ      Dynamic Standing Balance PT LTG, [place] Level contact guard assist  -SJ      Dynamic Standing Balance PT LTG, Outcome goal ongoing  -SJ        User Key  (r) = Recorded By, (t) = Taken By, (c) = Cosigned By    Initials Name Provider Type    DAVE Mason, PT Physical Therapist    EH Shauna Todd, PT Physical Therapist    SJ Sakshi Gutierrez, PT Physical Therapist    LS Shae Robledo, PT Physical Therapist    GAIL MAGANA  Tiffani, PT Physical Therapist    MADI Shen, PT Student PT Student          Physical Therapy Education     Title: PT OT SLP Therapies (Active)     Topic: Physical Therapy (Active)     Point: Mobility training (Active)    Learning Progress Summary    Learner Readiness Method Response Comment Documented by Status   Patient Nonacceptance E,D NR home safety, fall precautions, benefits of activity/therapy MB 07/16/17 1403 Active    Acceptance E NR   07/14/17 1314 Active    Acceptance E VU,NR   07/12/17 1414 Done    Acceptance E NR   07/11/17 1420 Active    Acceptance E NR   07/10/17 1152 Active    Acceptance E,D NR   07/09/17 1207 Active    Acceptance E NR   07/06/17 1412 Active               Point: Home exercise program (Active)    Learning Progress Summary    Learner Readiness Method Response Comment Documented by Status   Patient Nonacceptance E,D NR home safety, fall precautions, benefits of activity/therapy MB 07/16/17 1403 Active    Acceptance E NR   07/14/17 1314 Active    Acceptance E VU,NR   07/12/17 1414 Done    Acceptance E NR   07/11/17 1420 Active    Acceptance E,D NR   07/09/17 1207 Active               Point: Body mechanics (Active)    Learning Progress Summary    Learner Readiness Method Response Comment Documented by Status   Patient Nonacceptance E,D NR home safety, fall precautions, benefits of activity/therapy MB 07/16/17 1403 Active    Acceptance E NR   07/14/17 1314 Active    Acceptance E VU,NR   07/12/17 1414 Done    Acceptance E NR   07/11/17 1420 Active    Acceptance E NR   07/10/17 1152 Active    Acceptance E,D NR   07/09/17 1207 Active    Acceptance E NR   07/06/17 1412 Active               Point: Precautions (Active)    Learning Progress Summary    Learner Readiness Method Response Comment Documented by Status   Patient Nonacceptance E,D NR home safety, fall precautions, benefits of activity/therapy MB 07/16/17 1403 Active    Acceptance E [facility]  07/14/17 1314 Active    Acceptance E VU,NR   07/12/17 1414 Done    Acceptance E NR  EH 07/11/17 1420 Active    Acceptance E NR  KR 07/10/17 1152 Active    Acceptance E,D NR  LS 07/09/17 1207 Active    Acceptance E NR  SJ 07/06/17 1412 Active                      User Key     Initials Effective Dates Name Provider Type Discipline     06/19/15 -  Shauna Todd, PT Physical Therapist PT    SJ 06/19/15 -  Sakshi Gutierrez, PT Physical Therapist PT    LS 06/19/15 -  Shae Robledo, PT Physical Therapist PT    MB 03/14/16 -  Arlene Nixon, PT Physical Therapist PT    KR 05/30/17 -  Vijaya Shen, PT Student PT Student PT                    PT Recommendation and Plan  Anticipated Discharge Disposition: inpatient rehabilitation facility  Planned Therapy Interventions: balance training, bed mobility training, gait training, home exercise program, patient/family education, strengthening, transfer training  PT Frequency: daily  Plan of Care Review  Plan Of Care Reviewed With: patient  Progress: progress toward functional goals as expected  Outcome Summary/Follow up Plan: Pt requires encouragement to participate.  Pt amb 150 ft w/ CGA/min A.  Recommend gait w/ RW for improved safety.            Outcome Measures       07/16/17 1043 07/16/17 1042 07/14/17 1141    How much help from another person do you currently need...    Turning from your back to your side while in flat bed without using bedrails? 4  -MB  4  -EH    Moving from lying on back to sitting on the side of a flat bed without bedrails? 3  -MB  3  -EH    Moving to and from a bed to a chair (including a wheelchair)? 3  -MB  3  -EH    Standing up from a chair using your arms (e.g., wheelchair, bedside chair)? 3  -MB  3  -EH    Climbing 3-5 steps with a railing? 2  -MB  2  -EH    To walk in hospital room? 3  -MB  3  -EH    AM-PAC 6 Clicks Score 18  -MB  18  -EH    How much help from another is currently needed...    Putting on and taking off regular lower  body clothing?  3  -JR     Bathing (including washing, rinsing, and drying)  3  -JR     Toileting (which includes using toilet bed pan or urinal)  1  -JR     Putting on and taking off regular upper body clothing  3  -JR     Taking care of personal grooming (such as brushing teeth)  4  -JR     Eating meals  4  -JR     Score  18  -JR     Functional Assessment    Outcome Measure Options AM-PAC 6 Clicks Basic Mobility (PT)  -MB AM-PAC 6 Clicks Daily Activity (OT)  - AM-PAC 6 Clicks Basic Mobility (PT)  -      User Key  (r) = Recorded By, (t) = Taken By, (c) = Cosigned By    Initials Name Provider Type     Shauna Todd, PT Physical Therapist    JR Hilary Bolden, OT Occupational Therapist    GAIL Nixon, PT Physical Therapist           Time Calculation:         PT Charges       07/16/17 1409          Time Calculation    Start Time 1043  -MB      PT Received On 07/16/17  -MB      PT Goal Re-Cert Due Date 07/21/17  -MB      Time Calculation- PT    Total Timed Code Minutes- PT 23 minute(s)  -MB        User Key  (r) = Recorded By, (t) = Taken By, (c) = Cosigned By    Initials Name Provider Type    GAIL Nixon, PT Physical Therapist          Therapy Charges for Today     Code Description Service Date Service Provider Modifiers Qty    28200234982 HC GAIT TRAINING EA 15 MIN 7/16/2017 Arlene Nixon, PT GP 1    20110363475 HC PT THER PROC EA 15 MIN 7/16/2017 Arlene Nixon, PT GP 1          PT G-Codes  Outcome Measure Options: AM-PAC 6 Clicks Basic Mobility (PT)    Arlene Nixon, PT  7/16/2017

## 2017-07-17 LAB
ANION GAP SERPL CALCULATED.3IONS-SCNC: 11 MMOL/L (ref 3–11)
BUN BLD-MCNC: 47 MG/DL (ref 9–23)
BUN/CREAT SERPL: 33.6 (ref 7–25)
CALCIUM SPEC-SCNC: 9.9 MG/DL (ref 8.7–10.4)
CHLORIDE SERPL-SCNC: 95 MMOL/L (ref 99–109)
CO2 SERPL-SCNC: 30 MMOL/L (ref 20–31)
CREAT BLD-MCNC: 1.4 MG/DL (ref 0.6–1.3)
GFR SERPL CREATININE-BSD FRML MDRD: 50 ML/MIN/1.73
GLUCOSE BLD-MCNC: 126 MG/DL (ref 70–100)
GLUCOSE BLDC GLUCOMTR-MCNC: 118 MG/DL (ref 70–130)
GLUCOSE BLDC GLUCOMTR-MCNC: 145 MG/DL (ref 70–130)
GLUCOSE BLDC GLUCOMTR-MCNC: 199 MG/DL (ref 70–130)
POTASSIUM BLD-SCNC: 4.4 MMOL/L (ref 3.5–5.5)
SODIUM BLD-SCNC: 136 MMOL/L (ref 132–146)

## 2017-07-17 PROCEDURE — 99232 SBSQ HOSP IP/OBS MODERATE 35: CPT | Performed by: NURSE PRACTITIONER

## 2017-07-17 PROCEDURE — 97116 GAIT TRAINING THERAPY: CPT

## 2017-07-17 PROCEDURE — 25010000002 HEPARIN (PORCINE) PER 1000 UNITS: Performed by: NURSE PRACTITIONER

## 2017-07-17 PROCEDURE — 63710000001 INSULIN DETEMIR PER 5 UNITS: Performed by: HOSPITALIST

## 2017-07-17 PROCEDURE — 82962 GLUCOSE BLOOD TEST: CPT

## 2017-07-17 PROCEDURE — 80048 BASIC METABOLIC PNL TOTAL CA: CPT | Performed by: INTERNAL MEDICINE

## 2017-07-17 RX ADMIN — PANTOPRAZOLE SODIUM 40 MG: 40 TABLET, DELAYED RELEASE ORAL at 06:20

## 2017-07-17 RX ADMIN — ATORVASTATIN CALCIUM 20 MG: 20 TABLET, FILM COATED ORAL at 21:59

## 2017-07-17 RX ADMIN — HEPARIN SODIUM 5000 UNITS: 5000 INJECTION, SOLUTION INTRAVENOUS; SUBCUTANEOUS at 08:58

## 2017-07-17 RX ADMIN — DOXYCYCLINE HYCLATE 100 MG: 100 CAPSULE ORAL at 08:57

## 2017-07-17 RX ADMIN — CARVEDILOL 3.12 MG: 3.12 TABLET, FILM COATED ORAL at 08:57

## 2017-07-17 RX ADMIN — HEPARIN SODIUM 5000 UNITS: 5000 INJECTION, SOLUTION INTRAVENOUS; SUBCUTANEOUS at 22:00

## 2017-07-17 RX ADMIN — BUMETANIDE 1 MG: 1 TABLET ORAL at 08:57

## 2017-07-17 RX ADMIN — DOXYCYCLINE HYCLATE 100 MG: 100 CAPSULE ORAL at 22:00

## 2017-07-17 RX ADMIN — NYSTATIN: 100000 POWDER TOPICAL at 08:59

## 2017-07-17 RX ADMIN — LOSARTAN POTASSIUM 50 MG: 50 TABLET, FILM COATED ORAL at 08:57

## 2017-07-17 RX ADMIN — LEVOTHYROXINE SODIUM 50 MCG: 50 TABLET ORAL at 06:20

## 2017-07-17 RX ADMIN — BUMETANIDE 1 MG: 1 TABLET ORAL at 17:05

## 2017-07-17 RX ADMIN — CARVEDILOL 3.12 MG: 3.12 TABLET, FILM COATED ORAL at 22:00

## 2017-07-17 RX ADMIN — NYSTATIN: 100000 POWDER TOPICAL at 22:04

## 2017-07-17 RX ADMIN — INSULIN DETEMIR 5 UNITS: 100 INJECTION, SOLUTION SUBCUTANEOUS at 22:03

## 2017-07-17 RX ADMIN — ASPIRIN 81 MG: 81 TABLET, COATED ORAL at 08:57

## 2017-07-17 RX ADMIN — INSULIN LISPRO 2 UNITS: 100 INJECTION, SOLUTION INTRAVENOUS; SUBCUTANEOUS at 17:32

## 2017-07-17 RX ADMIN — Medication 5 MG: at 22:03

## 2017-07-17 NOTE — PROGRESS NOTES
"Continued Stay Note  [facility]     Patient Name: Luis F Plummer  MRN: [id number]  Today's Date: 7/17/2017    Admit Date: 7/5/2017          Discharge Plan       07/17/17 0359    Case Management/Social Work Plan    Plan update    Patient/Family In Agreement With Plan yes    Additional Comments Spoke with patient at bedside regarding discharge plan.  Patient advised CM that he does not want to talk about the Life Vest again.  He does not have it, he turned it in and \"they\" should know what numbers were on it and locte the person who has it.   Patient has a bed at [facility] [place] when he has a Life Vest.  CM following.  Patient ot discharge to [facility] when patient has a Life Vest and is medically ready              Discharge Codes     None        Expected Discharge Date and Time     Expected Discharge Date Expected Discharge Time    Jul 17, 2017             Joselin Gong RN    "

## 2017-07-17 NOTE — PLAN OF CARE
Problem: Patient Care Overview (Adult)  Goal: Plan of Care Review  Outcome: Ongoing (interventions implemented as appropriate)    07/17/17 1557   Coping/Psychosocial Response Interventions   Plan Of Care Reviewed With patient   Patient Care Overview   Progress improving   Outcome Evaluation   Outcome Summary/Follow up Plan Pt ambulates 210 ft in krishnan with RWx and CGA, 1 standing rest break.          Problem: Inpatient Physical Therapy  Goal: Transfer Training Goal 1 LTG- PT  Outcome: Ongoing (interventions implemented as appropriate)    07/06/17 1410 07/17/17 1557   Transfer Training PT LTG   Transfer Training PT LTG, Date Established 07/06/17 --    Transfer Training PT LTG, Time to Achieve 2 wks --    Transfer Training PT LTG, Activity Type bed to chair /chair to bed;sit to stand/stand to sit --    Transfer Training PT LTG, [place] Level supervision required --    Transfer Training PT LTG, Assist Device walker, rolling --    Transfer Training PT LTG, Outcome --  goal ongoing       Goal: Gait Training Goal LTG- PT  Outcome: Ongoing (interventions implemented as appropriate)    07/06/17 1410 07/17/17 1557   Gait Training PT LTG   Gait Training Goal PT LTG, Date Established 07/06/17 --    Gait Training Goal PT LTG, Time to Achieve 2 wks --    Gait Training Goal PT LTG, [place] Level supervision required --    Gait Training Goal PT LTG, Assist Device walker, rolling --    Gait Training Goal PT LTG, Distance to Achieve 200 --    Gait Training Goal PT LTG, Outcome --  goal ongoing       Goal: Dynamic Standing Balance Goal LTG- PT  Outcome: Ongoing (interventions implemented as appropriate)    07/06/17 1410 07/09/17 1207 07/16/17 1404   Dynamic Standing Balance PT LTG   Dynamic Standing Balance PT LTG, Date Established 07/06/17 --  --    Dynamic Standing Balance PT LTG, Time to Achieve 2 wks --  --    Dynamic Standing Balance PT LTG, [place] Level --  conditional independence --    Dynamic Standing Balance  PT LTG, Assist Device --  assistive Device --    Dynamic Standing Balance PT LTG, Date Goal Reviewed --  07/09/17  (previous goal achieved 7/9) --    Dynamic Standing Balance PT LTG, Outcome --  --  goal ongoing

## 2017-07-17 NOTE — PLAN OF CARE
Problem: Patient Care Overview (Adult)  Goal: Plan of Care Review  Outcome: Ongoing (interventions implemented as appropriate)  Patient with meetings today with life vest rep. Pt. States he doesn't know where vest is, he is tired of talking about it. Patient waiting for placement pending outcome of life vest situation.  Goal: Adult Individualization and Mutuality  Outcome: Ongoing (interventions implemented as appropriate)  Goal: Discharge Needs Assessment  Outcome: Ongoing (interventions implemented as appropriate)    Problem: Fall Risk (Adult)  Goal: Identify Related Risk Factors and Signs and Symptoms  Outcome: Ongoing (interventions implemented as appropriate)  Goal: Absence of Falls  Outcome: Ongoing (interventions implemented as appropriate)    Problem: Skin Integrity Impairment, Risk/Actual (Adult)  Goal: Identify Related Risk Factors and Signs and Symptoms  Outcome: Ongoing (interventions implemented as appropriate)    Problem: Cardiac Output, Decreased (Adult)  Goal: Identify Related Risk Factors and Signs and Symptoms  Outcome: Ongoing (interventions implemented as appropriate)  Goal: Adequate Cardiac Output/Effective Tissue Perfusion  Outcome: Ongoing (interventions implemented as appropriate)

## 2017-07-17 NOTE — PROGRESS NOTES
"    [facility] Medicine Services  INPATIENT PROGRESS NOTE    Date of Admission: 7/5/2017  Length of Stay: 11  Primary Care Physician: No Known Provider    Subjective   CC: LE edema    HPI:  Sleeping in chair. NAD. States is not eating much food and doesn't feel well today. Tired, did not sleep well overnight. Doesn't know where his previous life vest was \"dropped off\".     Review Of Systems:   Review of Systems   Constitutional: Negative for fever.   Cardiovascular: Positive for chest pain and leg swelling.   Gastrointestinal: Negative for abdominal pain.   Skin: Positive for wound.   All other systems reviewed and are negative.      Objective      Temp:  [97.9 °F (36.6 °C)-98.1 °F (36.7 °C)] 98.1 °F (36.7 °C)  Heart Rate:  [70-74] 70  Resp:  [16] 16  BP: (100-114)/(57-77) 114/77  Physical Exam   Constitutional: He appears well-developed and well-nourished. No distress.   HENT:   Head: Normocephalic.   Eyes: Pupils are equal, round, and reactive to light.   Cardiovascular: Normal rate, regular rhythm and normal heart sounds.    No murmur heard.  Pulmonary/Chest: Effort normal and breath sounds normal. No respiratory distress.   diminshed at the bases   Abdominal: Soft. Bowel sounds are normal. He exhibits no distension. There is no tenderness.   Musculoskeletal: He exhibits no edema.   Neurological: He is alert.   Seems oriented, very [place]  No focal deficits   Skin: Skin is warm and dry.   Some excoriations and scabbing on LEs   Psychiatric: He has a normal mood and affect.   Vitals reviewed.  Exam is unchanged    Results Review:    I have reviewed the labs, radiology results and diagnostic studies.          Results from last 7 days  Lab Units 07/17/17  0812   SODIUM mmol/L 136   POTASSIUM mmol/L 4.4   CHLORIDE mmol/L 95*   CO2 mmol/L 30.0   BUN mg/dL 47*   CREATININE mg/dL 1.40*   GLUCOSE mg/dL 126*   CALCIUM mg/dL 9.9       Culture Data: none     Radiology Data:   All imaging reviewed    Echo " - EF=20%    I have reviewed the medications.    Assessment/Plan     Problem List  Hospital Problem List     * (Principal)Acute on chronic systolic heart failure    Overview Signed 7/6/2017 11:09 AM by FRANCO Wood     · NYHA Class II         Peripheral edema    Coronary artery disease of native artery of native heart with stable angina pectoris    Overview Addendum 7/6/2017 11:06 AM by FRANCO Wood     · Cardiac catheter[facility] (01/26/2015): Multivessel disease.  Data deficit   · Coronary artery bypass with Dr. Rodríguez (01/27/2015): LIMA to LAD.  SVG to OM.  SVG to PDA.  LVEF 20-25%.  · Primary cardiologist is Dr. Bautista         Essential hypertension    Hyperlipidemia LDL goal <70    Overview Addendum 7/6/2017 11:09 AM by FRANCO Wood     · High-intensity statin therapy indicated given presence coronary artery disease           Type 2 diabetes mellitus    CKD (chronic kidney disease)    Peripheral neuropathy    Cellulitis-legs/feet with chronic ulcer- no osteomyelitis on MRI    Ischemic cardiomyopathy    Noncompliance        Assessment/Plan:  - clinically much improved from admission.  Has diuresed up to 23L based on I/Os.  PO bumex resumed, renal function stable  - EF=20%, on BB, ARB, statin.    - Order placed for lifevest, however the rep says the patient has had one previously and never turned in.  Attempting to call family to try to find.  Plan is to f/u with [facility] cardiologist to determine need for ICD in future.  - had some hypoglycemia and adjusted prandial doses, now good control and will leave on current regimen. Pt states not eating very much.   - patient with noted tick on arrival.  On doxy planned 14d.  Should also cover cellulitis seen on MRI.  - pleural effusion, mostly resolved with diuresis, no need for thoracentesis at this point  - reviewed CM notes, working on placement, hopefully will be transferred to rehab if all  "arranged including plan for lifevest. Spoke with liam montilla, Shawna Resendiz today and is attempting to locate where the patient may have returned previous vest. Cont to work on vest approval.     High complexity.    DVT prophylaxis: Heparin      Addendum:   Patient seen by Woo montilla this afternoon and states he is tired of discussing the placement of his last life vest does not want to discuss anymore. \"Tell my doctor I do not care. Im tired of talking about it\".   Woo montilla explained she is trying to help facilitate getting him a new device, not collect the old one.     Ese Stoddard, APRN   07/17/17   10:30 AM      "

## 2017-07-17 NOTE — THERAPY TREATMENT NOTE
Acute Care - Physical Therapy Treatment Note  [facility]     Patient Name: Luis F Plummer  : 1945  MRN: [id number]  Today's Date: 2017  Onset of Illness/Injury or Date of Surgery Date: 17  Date of Referral to PT: 17  Referring Physician: FRANCO Irizarry    Admit Date: 2017    Visit Dx:    ICD-10-CM ICD-9-CM   1. Peripheral edema R60.9 782.3   2. Pleural effusion J90 511.9   3. Acute pulmonary edema J81.0 518.4   4. Acute on chronic systolic congestive heart failure I50.23 428.23     428.0   5. Hyperlipidemia LDL goal <70 E78.5 272.4   6. Acute on chronic systolic heart failure I50.23 428.23   7. Coronary artery disease of native artery of native heart with stable angina pectoris I25.118 414.01     413.9   8. Impaired mobility and ADLs Z74.09 799.89   9. Impaired functional mobility, balance, gait, and endurance Z74.09 V49.89     Patient Active Problem List   Diagnosis   • Peripheral edema   • Acute on chronic systolic heart failure   • Coronary artery disease of native artery of native heart with stable angina pectoris   • Essential hypertension   • Hyperlipidemia LDL goal <70   • Type 2 diabetes mellitus   • CKD (chronic kidney disease)   • Peripheral neuropathy   • Cellulitis-legs/feet with chronic ulcer- no osteomyelitis on MRI   • Ischemic cardiomyopathy   • Noncompliance               Adult Rehabilitation Note       17 1519 17 1043 17 1042    Rehab Assessment/Intervention    Discipline physical therapist  -EH physical therapist  -MB occupational therapist  -JR    Document Type therapy note (daily note)  -EH therapy note (daily note)  -MB therapy note (daily note)  -JR    Subjective Information no complaints;agree to therapy  -EH no complaints;agree to therapy  -MB no complaints;agree to therapy  -JR    Patient Effort, Rehab Treatment adequate  -EH adequate  -MB adequate  -JR    Symptoms Noted During/After Treatment none  -EH none  -MB none  -JR     Precautions/Limitations fall precautions  - fall precautions  -MB fall precautions  -JR    Precautions/Limitations, Hearing hearing impairment, bilaterally   [place]  -EH hearing aid, bilaterally   Pt [place].  -MB hearing impairment, bilaterally  -JR    Recorded by [] Shauna Todd, PT [MB] Arlene Nixon, PT [JR] Hilary Bolden, OT    Vital Signs    Pre Systolic BP Rehab  143  -  -JR    Pre Treatment Diastolic BP  91  -MB 91  -JR    Pretreatment Heart Rate (beats/min)  74  -MB 74  -JR    Posttreatment Heart Rate (beats/min)  77  -MB 77  -JR    Pre Patient Position  Supine  -MB Supine  -JR    Intra Patient Position  Standing  -MB Standing  -JR    Post Patient Position  Sitting  -MB Sitting  -JR    Recorded by  [MB] Arlene Nixon, PT [JR] Hilary Bolden, OT    Pain Assessment    Pain Assessment Ansari-Simpson FACES  - 0-10  -MB 0-10  -JR    Ansari-Simpson FACES Pain Rating 4  -EH      Pain Score  7  -MB 7  -JR    Post Pain Score  7  -MB 7  -JR    Pain Location Foot  -EH Leg  -MB Leg  -JR    Pain Orientation Right;Left  -EH Right;Left  -MB Right;Left  -JR    Pain Intervention(s) Ambulation/increased activity  - Medication (See MAR);Repositioned;Ambulation/increased activity   Nsg premedicated.  -MB Repositioned;Ambulation/increased activity  -JR    Response to Interventions tolerated  - Tolerated  -MB Nsg gave meds pt tolerated  -JR    Recorded by [EH] Shauna Todd, PT [MB] Arlene Nixon, PT [JR] Hilary Bolden, OT    Cognitive Assessment/Intervention    Current Cognitive/Communication Assessment impaired  - impaired  -MB impaired   [place]  -JR    Orientation Status oriented x 4  -EH oriented x 4  -MB oriented x 4  -JR    Follows Commands/Answers Questions 100% of the time;able to follow single-step instructions  - 100% of the time;able to follow single-step instructions;needs increased time;needs cueing  -% of the time;able to follow single-step instructions  -JR    Personal  Safety mild impairment  - mild impairment;decreased awareness, need for assist;decreased awareness, need for safety;decreased insight to deficits  -MB WNL/WFL  -    Personal Safety Interventions fall prevention program maintained  - fall prevention program maintained;gait belt;muscle strengthening facilitated;nonskid shoes/slippers when out of bed  -MB     Recorded by [] Shauna Todd, PT [MB] Arlene Nixon, PT [JR] Hilary Bolden, OT    Bed Mobility, Assessment/Treatment    Bed Mobility, Assistive Device  head of bed elevated  -MB head of bed elevated  -    Bed Mob, Supine to Sit, [place]  supervision required;verbal cues required  -MB supervision required  -    Bed Mob, Sit to Supine, [place]  not tested  -MB     Bed Mobility, Safety Issues  decreased use of legs for bridging/pushing;decreased use of arms for pushing/pulling  -MB     Bed Mobility, Impairments  strength decreased  -MB     Bed Mobility, Comment Pt [facility]  - VCs to scoot forward to EOB to place feet on floor.  -MB     Recorded by [] Shauna Todd, PT [MB] Arlene Nixon, PT [JR] Hilary Bolden, OT    Transfer Assessment/Treatment    Transfers, Sit-Stand [place] contact guard assist;verbal cues required  - contact guard assist;verbal cues required  -MB contact guard assist  -    Transfers, Stand-Sit [place] verbal cues required;contact guard assist  - contact guard assist;verbal cues required  -MB contact guard assist  -    Transfers, Sit-Stand-Sit, Assist Device rolling walker  - rolling walker  -MB rolling walker  -    Transfer, Safety Issues balance decreased during turns;step length decreased;weight-shifting ability decreased;loses balance backward  - balance decreased during turns;step length decreased;weight-shifting ability decreased;loses balance backward  -MB balance decreased during turns;step length decreased  -    Transfer, Impairments strength decreased;coordination  "impaired;pain;postural control impaired  -EH strength decreased;impaired balance;pain;postural control impaired  -MB pain;strength decreased;impaired balance  -JR    Transfer, Comment VC for safe HP  - VCs for sequencing and safe hand placement.  -MB Verbal cues for hand placement wiith transfer  -JR    Recorded by [] Shauna Todd, PT [MB] Arlene Nixon, PT [JR] Hilary Bolden, OT    Gait Assessment/Treatment    Gait, [place] Level minimum assist (75% patient effort);verbal cues required;nonverbal cues required (demo/gesture)  - minimum assist (75% patient effort);verbal cues required;nonverbal cues required (demo/gesture)  -MB     Gait, Assistive Device rolling walker  - rolling walker  -MB     Gait, Distance (Feet) 210  - 150  -MB     Gait, Gait Pattern Analysis swing-through gait  - swing-through gait  -MB     Gait, Gait Deviations narrow base;forward flexed posture  - narrow base;forward flexed posture;toe-to-floor clearance decreased;[place] decreased;bilateral:;step length decreased  -MB     Gait, Safety Issues balance decreased during turns;weight-shifting ability decreased  - balance decreased during turns;sequencing ability decreased;step length decreased;weight-shifting ability decreased  -MB     Gait, Impairments impaired balance  - strength decreased;impaired balance;coordination impaired;pain  -MB     Gait, Comment Pt cued for improved posture, forward gaze with minimal improvement  - Pt amb 75 ft w/ RW and CGA w/ VCs for upright posture, forward gaze, and dec UE WBing.  Pt amb 75 ft w/out AD w/ min A w/ + veering.  Pt resistant to VCs/instruction for increased safety w/ gait.  Pt stated, \"I've been walking this way for 60 years.  I'm not going to change it now.\"    -MB     Recorded by [] Shauna Todd, PT [MB] Arlene Nixon, PT     Functional Mobility    Functional Mobility- Ind. Level   minimum assist (75% patient effort)  -JR    Functional " Mobility-Distance (Feet)   --   in  hallway  -    Functional Mobility- Safety Issues   balance decreased during turns;step length decreased  -    Functional Mobility- Comment   Pt ambulated in hallway with and without RWx. Pt required CGA with RWx and min A with UE support R. Pt required verbal cues for upright posture, walker safety and forward gaze.  -JR    Recorded by   [JR] Hilary Bolden, OT    Lower Body Dressing Assessment/Training    LB Dressing Assess/Train, Clothing Type   donning:;slipper socks  -JR    LB Dressing Assess/Train, Position   sitting;edge of bed  -JR    LB Dressing Assess/Train, [place]   set up required  -JR    LB Dressing Assess/Train, Impairments   decreased flexibility;strength decreased  -JR    Recorded by   [JR] Hilary Bolden, OT    Toileting Assessment/Training    Toileting Assess/Train, Position   standing  -JR    Toileting Assess/Train, Indepen Level   dependent (less than 25% patient effort)  -JR    Toileting Assess/Train, Impairments   decreased flexibility  -JR    Toileting Assess/Train, Comment   Pt incontinent of bowel, dependent for hygiene.  -JR    Recorded by   [JR] Hilary Bolden, OT    Motor Skills/Interventions    Additional Documentation Balance Skills Training (Group)  - Balance Skills Training (Group)  -MB     Recorded by [] Shauna Todd, PT [MB] Arlene Nixon, PT     Balance Skills Training    Sitting-Level of Assistance   Distant supervision  -    Sitting-Balance Support   Feet supported  -    Standing-Level of Assistance Contact guard  - Contact guard  -MB Contact guard  -    Static Standing Balance Support assistive device  - assistive device  -MB assistive device  -    Standing-Balance Activities  Weight Shift R-L;Weight Shift A-P;Forward lean   for dep hygiene; pt incontinent upon arrival.  -MB     Gait Balance-Level of Assistance Contact guard  - Contact guard  -MB Contact guard  -    Gait Balance Support assistive  device  - assistive device  -MB assistive device  -    Gait Balance Activities scanning environment R/L  - scanning environment R/L;side-stepping  -MB     Recorded by [] Shauna Todd, PT [MB] Arlene Nixon, PT [JR] Hilary Bolden, OT    Therapy Exercises    Bilateral Lower Extremities  AROM:;15 reps;sitting;ankle pumps/circles;heel raises;hip abduction/adduction;hip flexion;LAQ  -MB     Bilateral Upper Extremity   AROM:;10 reps;elbow flexion/extension;pronation/supination;shoulder extension/flexion  -JR    Recorded by  [MB] Arlene Nixon, PT [JR] Hilary Bolden, OT    Positioning and Restraints    Pre-Treatment Position sitting in chair/recliner  - in bed  -MB in bed  -JR    Post Treatment Position chair  - chair  -MB chair  -JR    In Chair notified nsg;reclined;call light within reach;encouraged to call for assist;exit alarm on  - notified nsg;reclined;call light within reach;encouraged to call for assist;exit alarm on;legs elevated  -MB notified nsg;reclined;call light within reach;encouraged to call for assist;exit alarm on  -JR    Recorded by [EH] Shauna Todd, PT [MB] Arlene Nixon, PT [JR] Hilary Bolden, OT      User Key  (r) = Recorded By, (t) = Taken By, (c) = Cosigned By    Initials Name Effective Dates     Shauna Todd, PT 06/19/15 -     JR Hilary Bolden, OT 06/22/15 -     GAIL Nixon, PT 03/14/16 -                 IP PT Goals       07/17/17 1557 07/16/17 1404 07/14/17 1314    Transfer Training PT LTG    Transfer Training PT LTG, Outcome goal ongoing  - goal ongoing  -MB goal ongoing  -    Gait Training PT LTG    Gait Training Goal PT LTG, Outcome goal ongoing  - goal ongoing  -MB goal ongoing  -    Dynamic Standing Balance PT LTG    Dynamic Standing Balance PT LTG, Outcome  goal ongoing  -MB goal ongoing  -      07/12/17 1415 07/11/17 1420 07/10/17 1152    Transfer Training PT LTG    Transfer Training PT LTG, Outcome goal  ongoing  - goal ongoing  - goal ongoing  -DAVE (r) KR (t) DAVE (c)    Gait Training PT LTG    Gait Training Goal PT LTG, Outcome goal ongoing  - goal ongoing  - goal ongoing  -DAVE (r) KR (t) DAVE (c)    Dynamic Standing Balance PT LTG    Dynamic Standing Balance PT LTG, Outcome  goal ongoing  - goal ongoing  -DAVE (r) KR (t) DAVE (c)      07/09/17 1207 07/06/17 1410       Transfer Training PT LTG    Transfer Training PT LTG, Date Established  07/06/17  -SJ     Transfer Training PT LTG, Time to Achieve  2 wks  -SJ     Transfer Training PT LTG, Activity Type  bed to chair /chair to bed;sit to stand/stand to sit  -SJ     Transfer Training PT LTG, [place] Level  supervision required  -SJ     Transfer Training PT LTG, Assist Device  walker, rolling  -SJ     Transfer Training PT LTG, Outcome  goal ongoing  -SJ     Gait Training PT LTG    Gait Training Goal PT LTG, Date Established  07/06/17  -SJ     Gait Training Goal PT LTG, Time to Achieve  2 wks  -SJ     Gait Training Goal PT LTG, [place] Level  supervision required  -SJ     Gait Training Goal PT LTG, Assist Device  walker, rolling  -SJ     Gait Training Goal PT LTG, Distance to Achieve  200  -SJ     Gait Training Goal PT LTG, Outcome goal ongoing  -LS goal ongoing  -SJ     Dynamic Standing Balance PT LTG    Dynamic Standing Balance PT LTG, Date Established  07/06/17  -SJ     Dynamic Standing Balance PT LTG, Time to Achieve  2 wks  -SJ     Dynamic Standing Balance PT LTG, [place] Level conditional independence  -LS contact guard assist  -SJ     Dynamic Standing Balance PT LTG, Assist Device assistive Device  -LS      Dynamic Standing Balance PT LTG, Date Goal Reviewed 07/09/17   previous goal achieved 7/9  -LS      Dynamic Standing Balance PT LTG, Outcome goal revised  -LS goal ongoing  -       User Key  (r) = Recorded By, (t) = Taken By, (c) = Cosigned By    Initials Name Provider Type    DAVE Mason, PT Physical Therapist    LM ACEVES  Perez, PT Physical Therapist    SJ Sakshi Gutierrez, PT Physical Therapist    ZACHARY Robledo, PT Physical Therapist    MB Arlene Nixon, PT Physical Therapist    MADI Shen, PT Student PT Student          Physical Therapy Education     Title: PT OT SLP Therapies (Active)     Topic: Physical Therapy (Active)     Point: Mobility training (Active)    Learning Progress Summary    Learner Readiness Method Response Comment Documented by Status   Patient Acceptance E NR   07/17/17 1556 Active    Nonacceptance E,D NR home safety, fall precautions, benefits of activity/therapy MB 07/16/17 1403 Active    Acceptance E NR   07/14/17 1314 Active    Acceptance E VU,NR   07/12/17 1414 Done    Acceptance E NR   07/11/17 1420 Active    Acceptance E NR  KR 07/10/17 1152 Active    Acceptance E,D NR   07/09/17 1207 Active    Acceptance E NR   07/06/17 1412 Active               Point: Home exercise program (Active)    Learning Progress Summary    Learner Readiness Method Response Comment Documented by Status   Patient Acceptance E NR   07/17/17 1556 Active    Nonacceptance E,D NR home safety, fall precautions, benefits of activity/therapy MB 07/16/17 1403 Active    Acceptance E NR   07/14/17 1314 Active    Acceptance E VU,NR   07/12/17 1414 Done    Acceptance E NR   07/11/17 1420 Active    Acceptance E,D NR   07/09/17 1207 Active               Point: Body mechanics (Active)    Learning Progress Summary    Learner Readiness Method Response Comment Documented by Status   Patient Acceptance E NR   07/17/17 1556 Active    Nonacceptance E,D NR home safety, fall precautions, benefits of activity/therapy MB 07/16/17 1403 Active    Acceptance E NR   07/14/17 1314 Active    Acceptance E VU,NR   07/12/17 1414 Done    Acceptance E NR   07/11/17 1420 Active    Acceptance E NR  KR 07/10/17 1152 Active    Acceptance E,D NR   07/09/17 1207 Active    Acceptance E NR   07/06/17 1412 Active                Point: Precautions (Active)    Learning Progress Summary    Learner Readiness Method Response Comment Documented by Status   Patient Acceptance E NR   07/17/17 1556 Active    Nonacceptance E,D NR home safety, fall precautions, benefits of activity/therapy MB 07/16/17 1403 Active    Acceptance E NR   07/14/17 1314 Active    Acceptance E VU,NR   07/12/17 1414 Done    Acceptance E NR  EH 07/11/17 1420 Active    Acceptance E NR  KR 07/10/17 1152 Active    Acceptance E,D NR  LS 07/09/17 1207 Active    Acceptance E NR  SJ 07/06/17 1412 Active                      User Key     Initials Effective Dates Name Provider Type Discipline     06/19/15 -  Shauna Todd, PT Physical Therapist PT     06/19/15 -  Sakshi Gutierrez, PT Physical Therapist PT     06/19/15 -  Shae Robledo, PT Physical Therapist PT    MB 03/14/16 -  Arlene Nixon, PT Physical Therapist PT    KR 05/30/17 -  Vijaya Shen, PT Student PT Student PT                    PT Recommendation and Plan  Anticipated Discharge Disposition: inpatient rehabilitation facility  Planned Therapy Interventions: balance training, bed mobility training, gait training, home exercise program, patient/family education, strengthening, transfer training  PT Frequency: daily  Plan of Care Review  Plan Of Care Reviewed With: patient  Progress: improving  Outcome Summary/Follow up Plan: Pt ambulates 210 ft in kr[place]nan with RWx and CGA, 1 standing rest break.           Outcome Measures       07/17/17 1519 07/16/17 1043 07/16/17 1042    How much help from another person do you currently need...    Turning from your back to your side while in flat bed without using bedrails? 4  -EH 4  -MB     Moving from lying on back to sitting on the side of a flat bed without bedrails? 3  -EH 3  -MB     Moving to and from a bed to a chair (including a wheelchair)? 3  -EH 3  -MB     Standing up from a chair using your arms (e.g., wheelchair, bedside chair)? 3  -EH 3  -MB     Climbing  3-5 steps with a railing? 2  - 2  -MB     To walk in hospital room? 3  -EH 3  -MB     AM-PAC 6 Clicks Score 18  -EH 18  -MB     How much help from another is currently needed...    Putting on and taking off regular lower body clothing?   3  -JR    Bathing (including washing, rinsing, and drying)   3  -JR    Toileting (which includes using toilet bed pan or urinal)   1  -JR    Putting on and taking off regular upper body clothing   3  -JR    Taking care of personal grooming (such as brushing teeth)   4  -JR    Eating meals   4  -JR    Score   18  -JR    Functional Assessment    Outcome Measure Options AM-PAC 6 Clicks Basic Mobility (PT)  -EH AM-PAC 6 Clicks Basic Mobility (PT)  -MB AM-PAC 6 Clicks Daily Activity (OT)  -JR      User Key  (r) = Recorded By, (t) = Taken By, (c) = Cosigned By    Initials Name Provider Type     Shauna Todd, PT Physical Therapist    JR Hilary Bolden, OT Occupational Therapist    GAIL Nixon, PT Physical Therapist           Time Calculation:         PT Charges       07/17/17 1559          Time Calculation    Start Time 1519  -      PT Received On 07/17/17  -      PT Goal Re-Cert Due Date 07/21/17  -      Time Calculation- PT    Total Timed Code Minutes- PT 16 minute(s)  -        User Key  (r) = Recorded By, (t) = Taken By, (c) = Cosigned By    Initials Name Provider Type     Shauna Todd, PT Physical Therapist          Therapy Charges for Today     Code Description Service Date Service Provider Modifiers Qty    [id number] HC GAIT TRAINING EA 15 MIN 7/17/2017 Shauna Todd, PT GP 1          PT G-Codes  Outcome Measure Options: -[facility] 6 Clicks Basic Mobility (PT)    Shauna Todd, PT  7/17/2017

## 2017-07-18 LAB
GLUCOSE BLDC GLUCOMTR-MCNC: 127 MG/DL (ref 70–130)
GLUCOSE BLDC GLUCOMTR-MCNC: 183 MG/DL (ref 70–130)
GLUCOSE BLDC GLUCOMTR-MCNC: 225 MG/DL (ref 70–130)
GLUCOSE BLDC GLUCOMTR-MCNC: 226 MG/DL (ref 70–130)

## 2017-07-18 PROCEDURE — 63710000001 INSULIN DETEMIR PER 5 UNITS: Performed by: HOSPITALIST

## 2017-07-18 PROCEDURE — 99232 SBSQ HOSP IP/OBS MODERATE 35: CPT | Performed by: NURSE PRACTITIONER

## 2017-07-18 PROCEDURE — 97530 THERAPEUTIC ACTIVITIES: CPT

## 2017-07-18 PROCEDURE — 93010 ELECTROCARDIOGRAM REPORT: CPT | Performed by: INTERNAL MEDICINE

## 2017-07-18 PROCEDURE — 82962 GLUCOSE BLOOD TEST: CPT

## 2017-07-18 PROCEDURE — 93005 ELECTROCARDIOGRAM TRACING: CPT | Performed by: INTERNAL MEDICINE

## 2017-07-18 PROCEDURE — 25010000002 HEPARIN (PORCINE) PER 1000 UNITS: Performed by: NURSE PRACTITIONER

## 2017-07-18 RX ADMIN — ACETAMINOPHEN 650 MG: 325 TABLET, FILM COATED ORAL at 16:17

## 2017-07-18 RX ADMIN — INSULIN LISPRO 4 UNITS: 100 INJECTION, SOLUTION INTRAVENOUS; SUBCUTANEOUS at 17:53

## 2017-07-18 RX ADMIN — INSULIN LISPRO 4 UNITS: 100 INJECTION, SOLUTION INTRAVENOUS; SUBCUTANEOUS at 21:00

## 2017-07-18 RX ADMIN — CARVEDILOL 3.12 MG: 3.12 TABLET, FILM COATED ORAL at 09:20

## 2017-07-18 RX ADMIN — NYSTATIN: 100000 POWDER TOPICAL at 09:25

## 2017-07-18 RX ADMIN — DOXYCYCLINE HYCLATE 100 MG: 100 CAPSULE ORAL at 20:59

## 2017-07-18 RX ADMIN — BUMETANIDE 1 MG: 1 TABLET ORAL at 17:54

## 2017-07-18 RX ADMIN — NYSTATIN: 100000 POWDER TOPICAL at 20:58

## 2017-07-18 RX ADMIN — ATORVASTATIN CALCIUM 20 MG: 20 TABLET, FILM COATED ORAL at 20:58

## 2017-07-18 RX ADMIN — INSULIN DETEMIR 5 UNITS: 100 INJECTION, SOLUTION SUBCUTANEOUS at 21:00

## 2017-07-18 RX ADMIN — BUMETANIDE 1 MG: 1 TABLET ORAL at 09:20

## 2017-07-18 RX ADMIN — ASPIRIN 81 MG: 81 TABLET, COATED ORAL at 09:19

## 2017-07-18 RX ADMIN — INSULIN LISPRO 2 UNITS: 100 INJECTION, SOLUTION INTRAVENOUS; SUBCUTANEOUS at 13:47

## 2017-07-18 RX ADMIN — PANTOPRAZOLE SODIUM 40 MG: 40 TABLET, DELAYED RELEASE ORAL at 05:51

## 2017-07-18 RX ADMIN — LEVOTHYROXINE SODIUM 50 MCG: 50 TABLET ORAL at 05:51

## 2017-07-18 RX ADMIN — HEPARIN SODIUM 5000 UNITS: 5000 INJECTION, SOLUTION INTRAVENOUS; SUBCUTANEOUS at 09:16

## 2017-07-18 RX ADMIN — DOXYCYCLINE HYCLATE 100 MG: 100 CAPSULE ORAL at 09:19

## 2017-07-18 RX ADMIN — LOSARTAN POTASSIUM 50 MG: 50 TABLET, FILM COATED ORAL at 09:20

## 2017-07-18 RX ADMIN — CARVEDILOL 3.12 MG: 3.12 TABLET, FILM COATED ORAL at 20:59

## 2017-07-18 RX ADMIN — HEPARIN SODIUM 5000 UNITS: 5000 INJECTION, SOLUTION INTRAVENOUS; SUBCUTANEOUS at 20:59

## 2017-07-18 RX ADMIN — Medication 5 MG: at 20:59

## 2017-07-18 NOTE — PLAN OF CARE
Problem: Patient Care Overview (Adult)  Goal: Plan of Care Review  Outcome: Ongoing (interventions implemented as appropriate)  Pt with episode of CP today. Reported to PT by Patient. Ekg obtained. Dr. Cesar. Notified. Tylenol given for pain. BP 98sytolic, no nitro given. Pain resolved by time  notified  Goal: Adult Individualization and Mutuality  Outcome: Ongoing (interventions implemented as appropriate)  Goal: Discharge Needs Assessment  Outcome: Ongoing (interventions implemented as appropriate)    Problem: Fall Risk (Adult)  Goal: Absence of Falls  Outcome: Ongoing (interventions implemented as appropriate)    Problem: Skin Integrity Impairment, Risk/Actual (Adult)  Goal: Identify Related Risk Factors and Signs and Symptoms  Outcome: Ongoing (interventions implemented as appropriate)    Problem: Cardiac Output, Decreased (Adult)  Goal: Identify Related Risk Factors and Signs and Symptoms  Outcome: Ongoing (interventions implemented as appropriate)  Goal: Adequate Cardiac Output/Effective Tissue Perfusion  Outcome: Ongoing (interventions implemented as appropriate)

## 2017-07-18 NOTE — PLAN OF CARE
Problem: Patient Care Overview (Adult)  Goal: Plan of Care Review  Outcome: Ongoing (interventions implemented as appropriate)    07/18/17 1527   Coping/Psychosocial Response Interventions   Plan Of Care Reviewed With patient   Patient Care Overview   Progress progress toward functional goals as expected   Outcome Evaluation   Outcome Summary/Follow up Plan Pt demo good effort, ambulated 120 ft w/ RW and performed commode t/f w/ CGAx1. Recommend cont skilled IPOT POC.          07/18/17 1527   Coping/Psychosocial Response Interventions   Plan Of Care Reviewed With patient   Patient Care Overview   Progress progress toward functional goals as expected   Outcome Evaluation   Outcome Summary/Follow up Plan Pt demo good effort, ambulated 120 ft w/ RW and performed commode t/f w/ CGAx1. Pt conts to require assist for toileting, though improved indep this date. Recommend cont skilled IPOT POC.          Problem: Inpatient Occupational Therapy  Goal: Bed Mobility Goal LTG- OT  Outcome: Ongoing (interventions implemented as appropriate)    07/06/17 1401 07/18/17 1527   Bed Mobility OT LTG   Bed Mobility OT LTG, Date Established 07/06/17 --    Bed Mobility OT LTG, Time to Achieve 1 wk --    Bed Mobility OT LTG, Activity Type all bed mobility --    Bed Mobility OT LTG, [place] Level conditional independence --    Bed Mobility OT LTG, Outcome --  goal ongoing       Goal: Transfer Training Goal 1 LTG- OT  Outcome: Ongoing (interventions implemented as appropriate)    07/06/17 1401 07/18/17 1527   Transfer Training OT LTG   Transfer Training OT LTG, Date Established 07/06/17 --    Transfer Training OT LTG, Time to Achieve 1 wk --    Transfer Training OT LTG, Activity Type bed to chair /chair to bed --    Transfer Training OT LTG, [place] Level supervision required --    Transfer Training OT LTG, Assist Device other (see comments)  (with appropriate AD) --    Transfer Training OT LTG, Outcome --  goal ongoing       Goal:  LB Dressing Goal LTG- OT  Outcome: Ongoing (interventions implemented as appropriate)    07/06/17 1401 07/16/17 1120 07/18/17 1527   LB Dressing OT LTG   LB Dressing Goal OT LTG, Date Established --  --  07/18/17   LB Dressing Goal OT LTG, Time to Achieve --  --  by discharge   LB Dressing Goal OT LTG, Activity Type alvin/doff B socks --  --    LB Dressing Goal OT LTG, [place] Level moderate assist (50% patient effort) --  --    LB Dressing Goal OT LTG, Adaptive Equipment reacher;sock-aid --  --    LB Dressing Goal OT LTG, Outcome --  goal partially met --

## 2017-07-18 NOTE — NURSING NOTE
"WOCN consulted for BLE ulcerations and feet. Nothing needed by WOCN at this time. Patient presents with Chronic venous ulcerations. Areas of reepithelializing and scabbed areas. Possible \"bug bites\". Leave all areas dry and open to air. On waffle mattress. WOCN will sign off.    "

## 2017-07-18 NOTE — PROGRESS NOTES
[facility] Medicine Services  INPATIENT PROGRESS NOTE    Date of Admission: 7/5/2017  Length of Stay: 12  Primary Care Physician: No Known Provider    Subjective   CC: LE edema    HPI:  Resting in bed with eyes closed. Arouses easily. Has been adamant with the staff he will not speak about the life vest anymore and gets very upset. Refused vital signs this morning. States he is not eating much, and no one is bringing him food. Nursing staff states he is eating two dinners. No new events overnight.     Review Of Systems:   Review of Systems   Constitutional: Negative for fever.   Cardiovascular: Positive for chest pain and leg swelling.   Gastrointestinal: Negative for abdominal pain.   Skin: Positive for wound.   All other systems reviewed and are negative.      Objective      Temp:  [97.8 °F (36.6 °C)-98.3 °F (36.8 °C)] 98.3 °F (36.8 °C)  Heart Rate:  [65-79] 65  Resp:  [16] 16  BP: (101-150)/() 101/72  Physical Exam   Constitutional: He appears well-developed and well-nourished. No distress.   HENT:   Head: Normocephalic.   Eyes: Pupils are equal, round, and reactive to light.   Cardiovascular: Normal rate, regular rhythm and normal heart sounds.    No murmur heard.  Pulmonary/Chest: Effort normal and breath sounds normal. No respiratory distress.   diminshed at the bases   Abdominal: Soft. Bowel sounds are normal. He exhibits no distension. There is no tenderness.   Musculoskeletal: He exhibits no edema.   Neurological: He is alert.   Seems oriented, very [place]  No focal deficits   Skin: Skin is warm and dry.   Some excoriations and scabbing on LEs   Psychiatric: He has a normal mood and affect.   Vitals reviewed.  Exam is unchanged    Results Review:    I have reviewed the labs, radiology results and diagnostic studies.          Results from last 7 days  Lab Units 07/17/17  0812   SODIUM mmol/L 136   POTASSIUM mmol/L 4.4   CHLORIDE mmol/L 95*   CO2 mmol/L 30.0   BUN mg/dL 47*      CREATININE mg/dL 1.40*   GLUCOSE mg/dL 126*   CALCIUM mg/dL 9.9       Culture Data: none     Radiology Data:   All imaging reviewed    Echo - EF=20%    I have reviewed the medications.    Assessment/Plan     Problem List  Hospital Problem List     * (Principal)Acute on chronic systolic heart failure    Overview Signed 7/6/2017 11:09 AM by FRANCO Wood     · NYHA Class II         Peripheral edema    Coronary artery disease of native artery of native heart with stable angina pectoris    Overview Addendum 7/6/2017 11:06 AM by FRANCO Wood     · Cardiac catheterization at [facility] (01/26/2015): Multivessel disease.  Data deficit   · Coronary artery bypass with Dr. Rodríguez (01/27/2015): LIMA to LAD.  SVG to OM.  SVG to PDA.  LVEF 20-25%.  · Primary cardiologist is Dr. Bautista         Essential hypertension    Hyperlipidemia LDL goal <70    Overview Addendum 7/6/2017 11:09 AM by FRANCO Wood     · High-intensity statin therapy indicated given presence coronary artery disease           Type 2 diabetes mellitus    CKD (chronic kidney disease)    Peripheral neuropathy    Cellulitis-legs/feet with chronic ulcer- no osteomyelitis on MRI    Ischemic cardiomyopathy    Noncompliance        Assessment/Plan:  - clinically much improved from admission.  Has diuresed up to 23L based on I/Os.  PO bumex resumed, renal function stable  - EF=20%, on BB, ARB, statin.    - Order placed for lifevest, however the rep says the patient has had one previously and never turned in.  Attempted to call family to try to find.  Plan is to f/u with [place] cardiologist to determine need for ICD in future. Woo Resendiz to return to hospital today and try to arrange different options since patient cannot afford new vest, and no one can locate previous vest or documentation of patient ever returning it. Consider discussion with EP here to determine need for ICD.   - had some  hypoglycemia and adjusted prandial doses, now good control and will leave on current regimen.   - patient with noted tick on arrival.  On doxy planned 14d (10/14 today).  Should also cover cellulitis seen on MRI.  - pleural effusion, mostly resolved with diuresis, no need for thoracentesis at this point  - reviewed CM notes, working on placement. [facility] has approved for bed, ONLY with life vest due to medical necessity. Asked CM to continue to refer to different locations for rehab r/t PT recommendations for IPPT.    High complexity.    DVT prophylaxis: Heparin      Ese Stoddard, APRN   07/18/17   11:44 AM

## 2017-07-18 NOTE — THERAPY TREATMENT NOTE
Acute Care - Occupational Therapy Treatment Note  [facility]     Patient Name: Luis F Plummer  : 1945  MRN: [id number]  Today's Date: 2017  Onset of Illness/Injury or Date of Surgery Date: 17  Date of Referral to OT: 17  Referring Physician: FRANCO Irizarry      Admit Date: 2017    Visit Dx:     ICD-10-CM ICD-9-CM   1. Peripheral edema R60.9 782.3   2. Pleural effusion J90 511.9   3. Acute pulmonary edema J81.0 518.4   4. Acute on chronic systolic congestive heart failure I50.23 428.23     428.0   5. Hyperlipidemia LDL goal <70 E78.5 272.4   6. Acute on chronic systolic heart failure I50.23 428.23   7. Coronary artery disease of native artery of native heart with stable angina pectoris I25.118 414.01     413.9   8. Impaired mobility and ADLs Z74.09 799.89   9. Impaired functional mobility, balance, gait, and endurance Z74.09 V49.89     Patient Active Problem List   Diagnosis   • Peripheral edema   • Acute on chronic systolic heart failure   • Coronary artery disease of native artery of native heart with stable angina pectoris   • Essential hypertension   • Hyperlipidemia LDL goal <70   • Type 2 diabetes mellitus   • CKD (chronic kidney disease)   • Peripheral neuropathy   • Cellulitis-legs/feet with chronic ulcer- no osteomyelitis on MRI   • Ischemic cardiomyopathy   • Noncompliance             Adult Rehabilitation Note       17 1442 17 1519 17 1043    Rehab Assessment/Intervention    Discipline occupational therapist  -CL physical therapist  -EH physical therapist  -MB    Document Type therapy note (daily note)  -CL therapy note (daily note)  -EH therapy note (daily note)  -MB    Subjective Information agree to therapy;complains of;fatigue  -CL no complaints;agree to therapy  -EH no complaints;agree to therapy  -MB    Patient Effort, Rehab Treatment good  -CL adequate  -EH adequate  -MB    Symptoms Noted During/After Treatment none  -CL none  -EH none  -MB     Precautions/Limitations fall precautions   Exit alarm  -CL fall precautions  -EH fall precautions  -MB    Precautions/Limitations, Hearing hearing impairment, bilaterally  -CL hearing impairment, bilaterally   [place]  -EH hearing aid, bilaterally   Pt [place].  -MB    Recorded by [CL] Basilia Gautam OT [EH] Shauna Todd, PT [MB] Arlene Nixon, PT    Vital Signs    Pre Systolic BP Rehab 101  -CL  143  -MB    Pre Treatment Diastolic BP 72  -CL  91  -MB    Pretreatment Heart Rate (beats/min) 67  -CL  74  -MB    Posttreatment Heart Rate (beats/min) 74  -CL  77  -MB    Pre SpO2 (%) 93  -CL      O2 Delivery Pre Treatment room air  -CL      Post SpO2 (%) 98  -CL      O2 Delivery Post Treatment room air  -CL      Pre Patient Position Supine  -CL  Supine  -MB    Intra Patient Position Standing  -CL  Standing  -MB    Post Patient Position Supine  -CL  Sitting  -MB    Recorded by [CL] Basilia Gautam OT  [MB] Arlene Nixon, PT    Pain Assessment    Pain Assessment Ansari-Simpson FACES  -CL Ansari-Baker FACES  -EH 0-10  -MB    Ansari-Simpson FACES Pain Rating 4  -CL 4  -EH     Pain Score   7  -MB    Post Pain Score   7  -MB    Pain Type Acute pain  -CL      Pain Location Leg   Pt c/o chest discomfort, RN notified.   -CL Foot  -EH Leg  -MB    Pain Orientation Right;Left  -CL Right;Left  -EH Right;Left  -MB    Pain Intervention(s) Repositioned;Ambulation/increased activity  -CL Ambulation/increased activity  -EH Medication (See MAR);Repositioned;Ambulation/increased activity   Nsg premedicated.  -MB    Response to Interventions Tolerated, RN notified.   -CL tolerated  -EH Tolerated  -MB    Recorded by [CL] Basilia Gautam OT [EH] Shauna Todd, PT [MB] Arlene Nixon, PT    Cognitive Assessment/Intervention    Current Cognitive/Communication Assessment impaired  -CL impaired  -EH impaired  -MB    Orientation Status oriented x 4  -CL oriented x 4  -EH oriented x 4  -MB    Follows Commands/Answers Questions 100% of the time;needs  cueing;needs increased time;needs repetition  -% of the time;able to follow single-step instructions  - 100% of the time;able to follow single-step instructions;needs increased time;needs cueing  -MB    Personal Safety moderate impairment;decreased awareness, need for assist;decreased awareness, need for safety;decreased insight to deficits  -CL mild impairment  -EH mild impairment;decreased awareness, need for assist;decreased awareness, need for safety;decreased insight to deficits  -MB    Personal Safety Interventions fall prevention program maintained;gait belt;nonskid shoes/slippers when out of bed  -CL fall prevention program maintained  - fall prevention program maintained;gait belt;muscle strengthening facilitated;nonskid shoes/slippers when out of bed  -MB    Recorded by [CL] Basilia Gautam OT [EH] Shauna Todd, PT [MB] Arlene Nixon, PT    Bed Mobility, Assessment/Treatment    Bed Mobility, Assistive Device bed rails;head of bed elevated  -CL  head of bed elevated  -MB    Bed Mob, Supine to Sit, [place] supervision required;verbal cues required  -CL  supervision required;verbal cues required  -MB    Bed Mob, Sit to Supine, [place] contact guard assist;verbal cues required  -CL  not tested  -MB    Bed Mobility, Safety Issues   decreased use of legs for bridging/pushing;decreased use of arms for pushing/pulling  -MB    Bed Mobility, Impairments   strength decreased  -MB    Bed Mobility, Comment VCs for safety. Pt required slight assist to place BLE back into bed upon returning supine.   -CL Pt [facility]  - VCs to scoot forward to EOB to place feet on floor.  -MB    Recorded by [CL] Basilia Gautam OT [EH] Shauna Todd, PT [MB] Arlene Nixon, PT    Transfer Assessment/Treatment    Transfers, Sit-Stand [place] minimum assist (75% patient effort);verbal cues required  -CL contact guard assist;verbal cues required  - contact guard assist;verbal cues required  -MB     Transfers, Stand-Sit [place] contact guard assist;verbal cues required  -CL verbal cues required;contact guard assist  -EH contact guard assist;verbal cues required  -MB    Transfers, Sit-Stand-Sit, Assist Device rolling walker  -CL rolling walker  -EH rolling walker  -MB    Toilet Transfer, [place] contact guard assist;verbal cues required  -CL      Toilet Transfer, Assistive Device rolling walker  -CL      Transfer, Safety Issues  balance decreased during turns;step length decreased;weight-shifting ability decreased;loses balance backward  -EH balance decreased during turns;step length decreased;weight-shifting ability decreased;loses balance backward  -MB    Transfer, Impairments  strength decreased;coordination impaired;pain;postural control impaired  -EH strength decreased;impaired balance;pain;postural control impaired  -MB    Transfer, Comment VCs for HP/sequencing of steps.   -CL VC for safe HP  -EH VCs for sequencing and safe hand placement.  -MB    Recorded by [CL] Basilia Gautam OT [EH] Shauna Todd, PT [MB] Arlene Nixon, PT    Gait Assessment/Treatment    Gait, [place] Level  minimum assist (75% patient effort);verbal cues required;nonverbal cues required (demo/gesture)  - minimum assist (75% patient effort);verbal cues required;nonverbal cues required (demo/gesture)  -MB    Gait, Assistive Device  rolling walker  -EH rolling walker  -MB    Gait, Distance (Feet)  210  -  -MB    Gait, Gait Pattern Analysis  swing-through gait  -EH swing-through gait  -MB    Gait, Gait Deviations  narrow base;forward flexed posture  - narrow base;forward flexed posture;toe-to-floor clearance decreased;[place] decreased;bilateral:;step length decreased  -MB    Gait, Safety Issues  balance decreased during turns;weight-shifting ability decreased  - balance decreased during turns;sequencing ability decreased;step length decreased;weight-shifting ability decreased  -MB    Gait, Impairments   "impaired balance  - strength decreased;impaired balance;coordination impaired;pain  -MB    Gait, Comment  Pt cued for improved posture, forward gaze with minimal improvement  -EH Pt amb 75 ft w/ RW and CGA w/ VCs for upright posture, forward gaze, and dec UE WBing.  Pt amb 75 ft w/out AD w/ min A w/ + veering.  Pt resistant to VCs/instruction for increased safety w/ gait.  Pt stated, \"I've been walking this way for 60 years.  I'm not going to change it now.\"    -MB    Recorded by  [EH] Shauna Todd, PT [MB] Arlene Nixon, PT    Functional Mobility    Functional Mobility- Ind. Level contact guard assist;verbal cues required  -CL      Functional Mobility- Device rolling walker  -CL      Functional Mobility-Distance (Feet) 120  -CL      Functional Mobility- Comment Pt ambulated in hallway and then to bathroom, VCs for posture.   -CL      Recorded by [CL] Basilia Gautam OT      Toileting Assessment/Training    Toileting Assess/Train, Position sitting;standing  -CL      Toileting Assess/Train, Indepen Level verbal cues required  -CL      Toileting Assess/Train, Comment Pt Max A for clothing management and Min A for post toilet hygiene, pt required assist for thoroughness.   -CL      Recorded by [CL] Basilia Gautam OT      Grooming Assessment/Training    Grooming Assess/Train, Position sitting  -CL      Grooming Assess/Train, Indepen Level set up required  -CL      Grooming Assess/Train, Comment Pt washed hands w/ sanitizing wipe.   -CL      Recorded by [CL] Basilia Gautam OT      Motor Skills/Interventions    Additional Documentation Balance Skills Training (Group)  - Balance Skills Training (Group)  - Balance Skills Training (Group)  -MB    Recorded by [CL] Basilia Gautam OT [EH] Shauna Todd, PT [MB] Arlene Nixon, PT    Balance Skills Training    Sitting-Level of Assistance Close supervision  -CL      Sitting-Balance Support Right upper extremity supported;Left upper extremity supported;Feet " supported  -CL      Sitting-Balance Activities Forward lean;Reaching for objects;[place] control activities  -CL      Standing-Level of Assistance Contact guard  -CL Contact guard  -EH Contact guard  -MB    Static Standing Balance Support assistive device  -CL assistive device  -EH assistive device  -MB    Standing-Balance Activities Weight Shift A-P;Weight Shift R-L;Reaching for objects  -CL  Weight Shift R-L;Weight Shift A-P;Forward lean   for dep hygiene; pt incontinent upon arrival.  -MB    Gait Balance-Level of Assistance Contact guard  -CL Contact guard  -EH Contact guard  -MB    Gait Balance Support assistive device  -CL assistive device  -EH assistive device  -MB    Gait Balance Activities scanning environment R/L;side-stepping  -CL scanning environment R/L  -EH scanning environment R/L;side-stepping  -MB    Recorded by [CL] Basilia Gautam, OT [EH] Shauna Todd, PT [MB] Arlene Nixon, PT    Therapy Exercises    Bilateral Lower Extremities   AROM:;15 reps;sitting;ankle pumps/circles;heel raises;hip abduction/adduction;hip flexion;LAQ  -MB    Recorded by   [MB] Arlene Nixon, PT    Positioning and Restraints    Pre-Treatment Position in bed  -CL sitting in chair/recliner  -EH in bed  -MB    Post Treatment Position bed  -CL chair  -EH chair  -MB    In Chair notified nsg;reclined;call light within reach;encouraged to call for assist;exit alarm on  -CL notified nsg;reclined;call light within reach;encouraged to call for assist;exit alarm on  -EH notified nsg;reclined;call light within reach;encouraged to call for assist;exit alarm on;legs elevated  -MB    Recorded by [CL] Basilia Gautam, OT [EH] Shauna Todd, PT [MB] Arlene Nixon, PT      07/16/17 1042          Rehab Assessment/Intervention    Discipline occupational therapist  -JR      Document Type therapy note (daily note)  -JR      Subjective Information no complaints;agree to therapy  -JR      Patient Effort, Rehab Treatment adequate  -JR       Symptoms Noted During/After Treatment none  -JR      Precautions/Limitations fall precautions  -JR      Precautions/Limitations, Hearing hearing impairment, bilaterally  -JR      Recorded by [JR] Hilary Bolden, OT      Vital Signs    Pre Systolic BP Rehab 143  -JR      Pre Treatment Diastolic BP 91  -JR      Pretreatment Heart Rate (beats/min) 74  -JR      Posttreatment Heart Rate (beats/min) 77  -JR      Pre Patient Position Supine  -JR      Intra Patient Position Standing  -JR      Post Patient Position Sitting  -JR      Recorded by [JR] Hilary Bolden, OT      Pain Assessment    Pain Assessment 0-10  -JR      Pain Score 7  -JR      Post Pain Score 7  -JR      Pain Location Leg  -JR      Pain Orientation Right;Left  -JR      Pain Intervention(s) Repositioned;Ambulation/increased activity  -JR      Response to Interventions Nsg gave meds pt tolerated  -JR      Recorded by [JR] Hilary Bolden, OT      Cognitive Assessment/Intervention    Current Cognitive/Communication Assessment impaired   [place]  -JR      Orientation Status oriented x 4  -JR      Follows Commands/Answers Questions 100% of the time;able to follow single-step instructions  -JR      Personal Safety WNL/WFL  -JR      Recorded by [JR] Hilary Bolden, OT      Bed Mobility, Assessment/Treatment    Bed Mobility, Assistive Device head of bed elevated  -JR      Bed Mob, Supine to Sit, [place] supervision required  -JR      Recorded by [JR] Hilary Bolden OT      Transfer Assessment/Treatment    Transfers, Sit-Stand [place] contact guard assist  -JR      Transfers, Stand-Sit [place] contact guard assist  -JR      Transfers, Sit-Stand-Sit, Assist Device rolling walker  -JR      Transfer, Safety Issues balance decreased during turns;step length decreased  -JR      Transfer, Impairments pain;strength decreased;impaired balance  -JR      Transfer, Comment Verbal cues for hand placement wiith transfer  -JR      Recorded by [JR]  Hilary Bolden OT      Functional Mobility    Functional Mobility- Ind. Level minimum assist (75% patient effort)  -JR      Functional Mobility-Distance (Feet) --   in  hallway  -JR      Functional Mobility- Safety Issues balance decreased during turns;step length decreased  -JR      Functional Mobility- Comment Pt ambulated in hallway with and without RWx. Pt required CGA with RWx and min A with UE support R. Pt required verbal cues for upright posture, walker safety and forward gaze.  -JR      Recorded by [JR] Hilary Bolden OT      Lower Body Dressing Assessment/Training    LB Dressing Assess/Train, Clothing Type donning:;slipper socks  -JR      LB Dressing Assess/Train, Position sitting;edge of bed  -JR      LB Dressing Assess/Train, [place] set up required  -JR      LB Dressing Assess/Train, Impairments decreased flexibility;strength decreased  -JR      Recorded by [JR] Hilary Bolden OT      Toileting Assessment/Training    Toileting Assess/Train, Position standing  -JR      Toileting Assess/Train, Indepen Level dependent (less than 25% patient effort)  -JR      Toileting Assess/Train, Impairments decreased flexibility  -JR      Toileting Assess/Train, Comment Pt incontinent of bowel, dependent for hygiene.  -JR      Recorded by [JR] Hilary Bolden OT      Balance Skills Training    Sitting-Level of Assistance Distant supervision  -JR      Sitting-Balance Support Feet supported  -JR      Standing-Level of Assistance Contact guard  -      Static Standing Balance Support assistive device  -JR      Gait Balance-Level of Assistance Contact guard  -      Gait Balance Support assistive device  -JR      Recorded by [JR] Hilary Bolden OT      Therapy Exercises    Bilateral Upper Extremity AROM:;10 reps;elbow flexion/extension;pronation/supination;shoulder extension/flexion  -JR      Recorded by [JR] Hilary Bolden OT      Positioning and Restraints    Pre-Treatment Position in bed  -JR       Post Treatment Position chair  -JR      In Chair notified nsg;reclined;call light within reach;encouraged to call for assist;exit alarm on  -JR      Recorded by [JR] Hilary Bolden, OT        User Key  (r) = Recorded By, (t) = Taken By, (c) = Cosigned By    Initials Name Effective Dates    EH Shauna ACEVES Perez, PT 06/19/15 -     JR Hilary Bolden, OT 06/22/15 -     MB Arlene Nixon, PT 03/14/16 -     CL Basilia Gautam, OT 06/08/16 -                 OT Goals       07/18/17 1527 07/16/17 1120 07/13/17 1349    Bed Mobility OT LTG    Bed Mobility OT LTG, Outcome goal ongoing  -CL goal ongoing  -JR goal ongoing  -AC    Transfer Training OT LTG    Transfer Training OT LTG, Outcome goal ongoing  -CL goal ongoing  -JR goal ongoing  -AC    LB Dressing OT LTG    LB Dressing Goal OT LTG, Date Established 07/18/17  -CL      LB Dressing Goal OT LTG, Time to Achieve by discharge  -CL      LB Dressing Goal OT LTG, Outcome  goal partially met  -JR goal partially met   met to don socks  -AC      07/11/17 1431 07/09/17 1253 07/06/17 1401    Bed Mobility OT LTG    Bed Mobility OT LTG, Date Established   07/06/17  -JR    Bed Mobility OT LTG, Time to Achieve   1 wk  -JR    Bed Mobility OT LTG, Activity Type   all bed mobility  -JR    Bed Mobility OT LTG, [place] Level   conditional independence  -JR    Bed Mobility OT LTG, Outcome goal ongoing   progressing  -MM goal ongoing  -CL     Transfer Training OT LTG    Transfer Training OT LTG, Date Established   07/06/17  -JR    Transfer Training OT LTG, Time to Achieve   1 wk  -JR    Transfer Training OT LTG, Activity Type   bed to chair /chair to bed  -JR    Transfer Training OT LTG, [place] Level   supervision required  -JR    Transfer Training OT LTG, Assist Device   other (see comments)   with appropriate AD  -JR    Transfer Training OT LTG, Outcome goal ongoing   progressing  -MM goal ongoing  -CL     LB Dressing OT LTG    LB Dressing Goal OT LTG, Date Established    07/06/17  -JR    LB Dressing Goal OT LTG, Time to Achieve   1 wk  -JR    LB Dressing Goal OT LTG, Activity Type   alvin/doff B socks  -JR    LB Dressing Goal OT LTG, [place] Level   moderate assist (50% patient effort)  -JR    LB Dressing Goal OT LTG, Adaptive Equipment   reacher;sock-aid  -JR    LB Dressing Goal OT LTG, Outcome goal partially met   supervision for socks  -MM goal ongoing  -CL       User Key  (r) = Recorded By, (t) = Taken By, (c) = Cosigned By    Initials Name Provider Type    AC Catalina Cantor, OT Occupational Therapist    MM Brenda Duarte, OT Occupational Therapist    JR Hilary Bolden, OT Occupational Therapist    CL Basilia Gautam, OT Occupational Therapist          Occupational Therapy Education     Title: PT OT SLP Therapies (Active)     Topic: Occupational Therapy (Active)     Point: ADL training (Active)    Description: Instruct learner(s) on proper safety adaptation and remediation techniques during self care or transfers.   Instruct in proper use of assistive devices.    Learning Progress Summary    Learner Readiness Method Response Comment Documented by Status   Patient Acceptance E,D NR Pt educated on appropriate safety precautions, t/f techniques, and benefits of therapy.  07/18/17 1526 Active    Acceptance E NR Educated pt on appropriate safety precautions, transfer techniques and guided pt through HEP.  07/16/17 1119 Active    Acceptance E NR safety with ADLs and use of walker AC 07/13/17 1349 Active    Acceptance E,TB VU,NR Guided through HEP, educated on precautions and improved mechanics for ADL/fnxl mob/tsf. MM 07/11/17 1430 Done    Acceptance E,D NR Pt educated on appropriate safety precautions, t/f techniques, and benefits of therapy.  07/09/17 1252 Active    Acceptance E NR Educated pt on appropriate transfer techniques, appropiate safety precautions, role of therapy and benefits of activity.  07/06/17 1359 Active               Point: Home exercise program  (Active)    Description: Instruct learner(s) on appropriate technique for monitoring, assisting and/or progressing therapeutic exercises/activities.    Learning Progress Summary    Learner Readiness Method Response Comment Documented by Status   Patient Acceptance E NR Educated pt on appropriate safety precautions, transfer techniques and guided pt through HEP.  07/16/17 1119 Active    Acceptance E,TB VU,NR Guided through HEP, educated on precautions and improved mechanics for ADL/fnxl mob/tsf.  07/11/17 1430 Done               Point: Precautions (Active)    Description: Instruct learner(s) on prescribed precautions during self-care and functional transfers.    Learning Progress Summary    Learner Readiness Method Response Comment Documented by Status   Patient Acceptance E,D NR Pt educated on appropriate safety precautions, t/f techniques, and benefits of therapy.  07/18/17 1526 Active    Acceptance E,TB VU,NR Guided through HEP, educated on precautions and improved mechanics for ADL/fnxl mob/tsf. MM 07/11/17 1430 Done    Acceptance E,D NR Pt educated on appropriate safety precautions, t/f techniques, and benefits of therapy.  07/09/17 1252 Active               Point: Body mechanics (Active)    Description: Instruct learner(s) on proper positioning and spine alignment during self-care, functional mobility activities and/or exercises.    Learning Progress Summary    Learner Readiness Method Response Comment Documented by Status   Patient Acceptance E,D NR Pt educated on appropriate safety precautions, t/f techniques, and benefits of therapy.  07/18/17 1526 Active    Acceptance E,TB VU,NR Guided through HEP, educated on precautions and improved mechanics for ADL/fnxl mob/tsf.  07/11/17 1430 Done    Acceptance E,D NR Pt educated on appropriate safety precautions, t/f techniques, and benefits of therapy.  07/09/17 1252 Active                      User Key     Initials Effective Dates Name Provider Type  Discipline    AC 06/23/15 -  Catalina Cantor, OT Occupational Therapist OT    MM 06/22/15 -  Brenda Duarte, OT Occupational Therapist OT    JR 06/22/15 -  Hilary Bolden, OT Occupational Therapist OT    CL 06/08/16 -  Basilia Gautam, OT Occupational Therapist OT                  OT Recommendation and Plan  Anticipated Equipment Needs At Discharge: bedside commode, tub bench  Anticipated Discharge Disposition: inpatient rehabilitation facility  Planned Therapy Interventions: ADL retraining, adaptive equipment training, balance training, bed mobility training, transfer training  Therapy Frequency: daily  Plan of Care Review  Plan Of Care Reviewed With: patient  Progress: progress toward functional goals as expected  Outcome Summary/Follow up Plan: Pt demo good effort, ambulated 120 ft w/ RW and performed commode t/f w/ CGAx1. Pt conts to require assist for toileting, though improved indep this date. Recommend cont skilled IPOT POC.         Outcome Measures       07/18/17 1442 07/17/17 1519 07/16/17 1043    How much help from another person do you currently need...    Turning from your back to your side while in flat bed without using bedrails?  4  -EH 4  -MB    Moving from lying on back to sitting on the side of a flat bed without bedrails?  3  -EH 3  -MB    Moving to and from a bed to a chair (including a wheelchair)?  3  -EH 3  -MB    Standing up from a chair using your arms (e.g., wheelchair, bedside chair)?  3  -EH 3  -MB    Climbing 3-5 steps with a railing?  2  -EH 2  -MB    To walk in hospital room?  3  -EH 3  -MB    AM-PAC 6 Clicks Score  18  -EH 18  -MB    How much help from another is currently needed...    Putting on and taking off regular lower body clothing? 2  -CL      Bathing (including washing, rinsing, and drying) 2  -CL      Toileting (which includes using toilet bed pan or urinal) 2  -CL      Putting on and taking off regular upper body clothing 3  -CL      Taking care of personal grooming  (such as brushing teeth) 4  -CL      Eating meals 4  -CL      Score 17  -CL      Functional Assessment    Outcome Measure Options AM-PAC 6 Clicks Daily Activity (OT)  -CL AM-PAC 6 Clicks Basic Mobility (PT)  - AM-PAC 6 Clicks Basic Mobility (PT)  -MB      07/16/17 1042          How much help from another is currently needed...    Putting on and taking off regular lower body clothing? 3  -JR      Bathing (including washing, rinsing, and drying) 3  -JR      Toileting (which includes using toilet bed pan or urinal) 1  -JR      Putting on and taking off regular upper body clothing 3  -JR      Taking care of personal grooming (such as brushing teeth) 4  -JR      Eating meals 4  -JR      Score 18  -JR      Functional Assessment    Outcome Measure Options AM-PAC 6 Clicks Daily Activity (OT)  -JR        User Key  (r) = Recorded By, (t) = Taken By, (c) = Cosigned By    Initials Name Provider Type     Shauna Todd, PT Physical Therapist    JR Hilary Bolden, OT Occupational Therapist    MB Arlene Nixon, PT Physical Therapist    CL Basilia Gautam, OT Occupational Therapist           Time Calculation:         Time Calculation- OT       07/18/17 1530          Time Calculation- OT    OT Start Time 1442  -CL      Total Timed Code Minutes- OT 26 minute(s)  -CL      OT Received On 07/18/17  -CL      OT Goal Re-Cert Due Date 07/26/17  -CL        User Key  (r) = Recorded By, (t) = Taken By, (c) = Cosigned By    Initials Name Provider Type    CL Basilia Gautam OT Occupational Therapist           Therapy Charges for Today     Code Description Service Date Service Provider Modifiers Qty    [id number]  OT THERAPEUTIC ACT EA 15 MIN 7/18/2017 Basilia Gautam OT GO 2               Basilia Gautam OT  7/18/2017

## 2017-07-18 NOTE — PROGRESS NOTES
Continued Stay Note  [facility]     Patient Name: Luis F Plummer  MRN: [id number]  Today's Date: 7/18/2017    Admit Date: 7/5/2017          Discharge Plan       07/18/17 1041    Case Management/Social Work Plan    Plan update    Patient/Family In Agreement With Plan yes    Additional Comments Spoke to Denisha with [facility] who will accepte the patient only with the Life Vest since it is medically necessary.  Spoke to Shawna Resendiz with [facility] who indicated that she would be at the hospital soon to address options.  CM following for discharge needs.  Patient discharge plan is undetermined at this time.               Discharge Codes     None        Expected Discharge Date and Time     Expected Discharge Date Expected Discharge Time    Jul 18, 2017             Joselin Gong RN

## 2017-07-18 NOTE — PLAN OF CARE
Problem: Patient Care Overview (Adult)  Goal: Plan of Care Review  Outcome: Ongoing (interventions implemented as appropriate)    07/18/17 0456   Coping/Psychosocial Response Interventions   Plan Of Care Reviewed With patient   Patient Care Overview   Progress progress toward functional goals as expected   Outcome Evaluation   Outcome Summary/Follow up Plan Pt rested well through the night with no acute episodes. Refused removal of outdated IV after education on infection risks. VSS.        Goal: Adult Individualization and Mutuality  Outcome: Ongoing (interventions implemented as appropriate)  Goal: Discharge Needs Assessment  Outcome: Ongoing (interventions implemented as appropriate)    Problem: Fall Risk (Adult)  Goal: Identify Related Risk Factors and Signs and Symptoms  Outcome: Ongoing (interventions implemented as appropriate)  Goal: Absence of Falls  Outcome: Ongoing (interventions implemented as appropriate)    Problem: Skin Integrity Impairment, Risk/Actual (Adult)  Goal: Identify Related Risk Factors and Signs and Symptoms  Outcome: Ongoing (interventions implemented as appropriate)    Problem: Cardiac Output, Decreased (Adult)  Goal: Identify Related Risk Factors and Signs and Symptoms  Outcome: Ongoing (interventions implemented as appropriate)  Goal: Adequate Cardiac Output/Effective Tissue Perfusion  Outcome: Ongoing (interventions implemented as appropriate)

## 2017-07-19 LAB
GLUCOSE BLDC GLUCOMTR-MCNC: 116 MG/DL (ref 70–130)
GLUCOSE BLDC GLUCOMTR-MCNC: 191 MG/DL (ref 70–130)
GLUCOSE BLDC GLUCOMTR-MCNC: 212 MG/DL (ref 70–130)
GLUCOSE BLDC GLUCOMTR-MCNC: 224 MG/DL (ref 70–130)

## 2017-07-19 PROCEDURE — 97116 GAIT TRAINING THERAPY: CPT

## 2017-07-19 PROCEDURE — 82962 GLUCOSE BLOOD TEST: CPT

## 2017-07-19 PROCEDURE — 25010000002 HEPARIN (PORCINE) PER 1000 UNITS: Performed by: NURSE PRACTITIONER

## 2017-07-19 PROCEDURE — 97110 THERAPEUTIC EXERCISES: CPT

## 2017-07-19 PROCEDURE — 63710000001 INSULIN DETEMIR PER 5 UNITS: Performed by: HOSPITALIST

## 2017-07-19 PROCEDURE — 99222 1ST HOSP IP/OBS MODERATE 55: CPT | Performed by: INTERNAL MEDICINE

## 2017-07-19 PROCEDURE — 99232 SBSQ HOSP IP/OBS MODERATE 35: CPT | Performed by: NURSE PRACTITIONER

## 2017-07-19 RX ADMIN — LOSARTAN POTASSIUM 50 MG: 50 TABLET, FILM COATED ORAL at 08:51

## 2017-07-19 RX ADMIN — HEPARIN SODIUM 5000 UNITS: 5000 INJECTION, SOLUTION INTRAVENOUS; SUBCUTANEOUS at 08:51

## 2017-07-19 RX ADMIN — BUMETANIDE 1 MG: 1 TABLET ORAL at 17:42

## 2017-07-19 RX ADMIN — ASPIRIN 81 MG: 81 TABLET, COATED ORAL at 08:50

## 2017-07-19 RX ADMIN — CARVEDILOL 3.12 MG: 3.12 TABLET, FILM COATED ORAL at 08:51

## 2017-07-19 RX ADMIN — ATORVASTATIN CALCIUM 20 MG: 20 TABLET, FILM COATED ORAL at 21:31

## 2017-07-19 RX ADMIN — HEPARIN SODIUM 5000 UNITS: 5000 INJECTION, SOLUTION INTRAVENOUS; SUBCUTANEOUS at 21:31

## 2017-07-19 RX ADMIN — LEVOTHYROXINE SODIUM 50 MCG: 50 TABLET ORAL at 05:37

## 2017-07-19 RX ADMIN — INSULIN LISPRO 4 UNITS: 100 INJECTION, SOLUTION INTRAVENOUS; SUBCUTANEOUS at 12:17

## 2017-07-19 RX ADMIN — NYSTATIN: 100000 POWDER TOPICAL at 08:51

## 2017-07-19 RX ADMIN — NYSTATIN: 100000 POWDER TOPICAL at 21:31

## 2017-07-19 RX ADMIN — DOXYCYCLINE HYCLATE 100 MG: 100 CAPSULE ORAL at 08:50

## 2017-07-19 RX ADMIN — DOXYCYCLINE HYCLATE 100 MG: 100 CAPSULE ORAL at 21:31

## 2017-07-19 RX ADMIN — INSULIN LISPRO 2 UNITS: 100 INJECTION, SOLUTION INTRAVENOUS; SUBCUTANEOUS at 21:32

## 2017-07-19 RX ADMIN — PANTOPRAZOLE SODIUM 40 MG: 40 TABLET, DELAYED RELEASE ORAL at 05:37

## 2017-07-19 RX ADMIN — INSULIN LISPRO 4 UNITS: 100 INJECTION, SOLUTION INTRAVENOUS; SUBCUTANEOUS at 17:42

## 2017-07-19 RX ADMIN — INSULIN LISPRO 4 UNITS: 100 INJECTION, SOLUTION INTRAVENOUS; SUBCUTANEOUS at 08:51

## 2017-07-19 RX ADMIN — BUMETANIDE 1 MG: 1 TABLET ORAL at 08:50

## 2017-07-19 RX ADMIN — INSULIN DETEMIR 5 UNITS: 100 INJECTION, SOLUTION SUBCUTANEOUS at 21:31

## 2017-07-19 NOTE — CONSULTS
[facility]  Cardiovascular Consultation Note      Luis F Plummer  [id number]  [id number]  1945    DATE OF ADMISSION: 7/5/2017  DATE OF CONSULTATION:  7/19/2017    No Known Provider    Chief complaint/ Reason for Consultation: Questionable if ICD is warranted  Consult Requested by: Hospital Team    History of Present Illness:  Patient is a 71-year-old male with known coronary artery disease status post three-vessel CABG in 2015 that follows  in [place].  He presented to [facility] on July 5 th with complaints of increased swelling.  He has ruled in for acute exacerbation of heart failure with a elevated BNP greater than 2000 and chest x-ray showing bilateral pleural effusions with moderate pulmonary edema.  His troponins and EKGs have been normal ruling him out for an acute coronary syndrome.  He denies dyspnea, orthopnea, palpitations or syncope.  He does experience occasional chest pain but reports it is nothing new for him and has remained unchanged.  He reports bilateral leg pain and scrotal pain due to increased swelling. His legs have become so swollen he has extreme difficulty walking.  He lives home alone and is normally able to care for himself.   He has been intolerant to Lasix and Demadex in the past but reports Bumex works well for him.  It is worth noting he does not have the best living arrangements.  According to the patient he has not been taking his medications because he is unable to afford them for a long time including all cardiac medications.    Cardiology has not seen the patient since July 13 and at that time recommended a LifeVest.  There has been issues with getting the LifeVest approved and also with getting him placement since he is too weak to go home and not with proper living arrangements since the place that they have gotten approval is determined to have a LifeVest if he was to go there.  Hospital team wanted cardiology  "to come by just to see if he was a candidate for an ICD and what to do with the LifeVest issues.     states he is breathing better.  He states he is ready to get to rehabilitation to get stronger.      Past Medical History:   Diagnosis Date   • Diabetes mellitus        Past Surgical History:   Procedure Laterality Date   • BACK SURGERY         No current facility-administered medications on file prior to encounter.      No current outpatient prescriptions on file prior to encounter.       Social History     Social History   • Marital status: Single     Spouse name: N/A   • Number of children: N/A   • Years of education: N/A     Occupational History   • Not on file.     Social History Main Topics   • Smoking status: Never Smoker   • Smokeless tobacco: Not on file   • Alcohol use No   • Drug use: Not on file   • Sexual activity: Not on file     Other Topics Concern   • Not on file     Social History Narrative       FAMHX  CAD, diabetes, hypertension.     REVIEW OF SYSTEMS:   14 point ROS was performed and is Negative except as outlined in HPI    Objective:     Vitals:    07/19/17 0345 07/19/17 0739 07/19/17 1600 07/19/17 1700   BP: 105/64 138/66 90/57 113/70   BP Location: Right arm Left arm Right arm Right arm   Patient Position: Lying Sitting Lying Lying   Pulse:  68 70 66   Resp:  16 16 18   Temp: 97.7 °F (36.5 °C) 97.9 °F (36.6 °C)     TempSrc: Oral Oral     SpO2:   99%    Weight:       Height:         Body mass index is 23.71 kg/(m^2).  Flowsheet Rows         First Filed Value    Admission Height  71\" (180.3 cm) Documented at 07/05/2017 2031    Admission Weight  170 lb (77.1 kg) Documented at 07/05/2017 2031          Intake/Output Summary (Last 24 hours) at 07/19/17 1755  Last data filed at 07/19/17 1745   Gross per 24 hour   Intake              720 ml   Output             3525 ml   Net            -2805 ml       Physical Exam   Constitutional: oriented to person, place, and time.  well-developed and " well-nourished. No distress.   HENT: Normocephalic.   Eyes: Conjunctivae are normal. No scleral icterus.   Neck: Normal carotid pulses, no hepatojugular reflux and no JVD present. Carotid bruit is not present. No tracheal deviation, no edema and no erythema present. No thyromegaly present.   Cardiovascular: Normal rate, regular rhythm, S1 normal, S2 normal, normal heart sounds and intact distal pulses.   No extrasystoles are present. PMI is not displaced.  Exam reveals no gallop, no distant heart sounds and no friction rub.    + NANCY  Pulses:       Radial pulses are 2+ on the right side, and 2+ on the left side.       Dorsalis pedis pulses are 2+ on the right side, and 2+ on the left side.   Pulmonary/Chest: Decreased breath sounds at the bases with occasional rales noted.  Abdominal: Soft. Bowel sounds are normal. She exhibits no distension and no mass. There is no hepatosplenomegaly. There is no tenderness. There is no rebound and no guarding.   Musculoskeletal:  exhibits no edema, tenderness or deformity.   EXT: No swelling  Neurological: is alert and oriented to person, place, and time.   Skin: No significant edema however patient does have multiple lesions throughout his lower extremities.    Psychiatric: Normal mood and affect.Speech is normal and behavior is normal.      Lab Review:                  Results from last 7 days  Lab Units 07/17/17  0812   SODIUM mmol/L 136   POTASSIUM mmol/L 4.4   CHLORIDE mmol/L 95*   CO2 mmol/L 30.0   BUN mg/dL 47*   CREATININE mg/dL 1.40*   GLUCOSE mg/dL 126*   CALCIUM mg/dL 9.9                             TTE  · Left ventricular systolic function is severely decreased. Estimated EF = 20%.  · Right ventricular cavity is moderate-to-severely dilated.  · Left atrial cavity size is mild-to-moderately dilated.  · Severe tricuspid valve regurgitation is present.  · Mild pulmonic valve regurgitation is present.  · Left ventricular diastolic dysfunction (grade I) consistent with  impaired relaxation.  · Severely reduced right ventricular systolic function noted.  · Moderate-to-severe mitral valve regurgitation is present.  · There is no evidence of pericardial effusion.  · There is a large size left pleural effusion.  · Moderate pulmonary hypertension is present.  · Mild aortic valvular sclerosis without stenosis.    I personally viewed and interpreted the patient's EKG/Telemetry data    EKG NSR with normal QRS of about 92 msec.       Assessment/Plan:   IMPRESSION:  1. Coronary artery disease status post CABG in 2015 follows in [place].  2.  Ischemic cardiomyopathy however has not been on optimal medical therapy due to issues with cost for greater than 90 days.  Due to some noncompliance due to the cost patient states that he has not been taking cardiac medications for some time.   3.  Functional class 2-3 symptomatology  4.  Acute on chronic systolic congestive heart failure continuing with diuresis.  5. L LE cellulitis on Abx's  6.  Deconditioned state with poor living arrangements.    Plan:  --> I think the patient needs to be on optimal medical therapy for greater than 90 days consistently and we need to help him with being able to afford these medications.  We were unable to implant a device at this time since he has not been on optimal medical therapy for greater than 90 days even though his ejection fraction has been less than 30% since 2015.  I even discussed with the patient about a defibrillator and he wants to think about it anyways before he even says yes to having this done in the future.    --> I would recommend a lifevest however this is not a mandatory thing and truly depends on patient's preference.  I think the patient can go to rehabilitation without a LifeVest just watch him closely.  Many patients go to rehabilitation without a LifeVest due to their preference or due to  preference of not even giving the patient LifeVest in the first place.    --> Continue treatment  of his cellulitis which seems to be active.    --> After full treatment on optimal medical therapy for 90 days if ejection fraction is still less than or equal to 30% then at that time we would consider implantation of an ICD.    --> He should follow-up with a general cardiologist in [place] to continue to allow treatment of his ischemic cardiomyopathy and I think it is of the utmost importance to try to help him try to be able to afford medical therapy that he truly needs and deserves.    --> Please call Electrophysiology back if questions however for optimization of cardiac medical therapy or issues with the LifeVest please call general cardiology team since Dr. Prescott has been following.        Thank you for allowing me to participate in the care of Luis F Plummer. Feel free to contact me directly with any further questions or concerns.    CC: Ese Argueta,   07/19/17  5:55 PM.    EMR Dragon/Transcription disclaimer:  Much of this encounter note is an electronic transcription/translation of spoken language to printed text. Electronic translation of spoken language may permit erroneous, or at times, nonsensical words or phrases to be inadvertently transcribed. Although I have reviewed the note for such errors, some may still exist.

## 2017-07-19 NOTE — PLAN OF CARE
Problem: Patient Care Overview (Adult)  Goal: Plan of Care Review  Outcome: Ongoing (interventions implemented as appropriate)  Goal: Adult Individualization and Mutuality  Outcome: Ongoing (interventions implemented as appropriate)  Goal: Discharge Needs Assessment  Outcome: Ongoing (interventions implemented as appropriate)    Problem: Skin Integrity Impairment, Risk/Actual (Adult)  Goal: Identify Related Risk Factors and Signs and Symptoms  Outcome: Ongoing (interventions implemented as appropriate)    Problem: Cardiac Output, Decreased (Adult)  Goal: Identify Related Risk Factors and Signs and Symptoms  Outcome: Ongoing (interventions implemented as appropriate)  Goal: Adequate Cardiac Output/Effective Tissue Perfusion  Outcome: Ongoing (interventions implemented as appropriate)

## 2017-07-19 NOTE — PLAN OF CARE
Problem: Patient Care Overview (Adult)  Goal: Plan of Care Review  Outcome: Ongoing (interventions implemented as appropriate)    07/19/17 0446   Coping/Psychosocial Response Interventions   Plan Of Care Reviewed With patient   Patient Care Overview   Progress progress towards functional goals is fair       Goal: Adult Individualization and Mutuality  Outcome: Ongoing (interventions implemented as appropriate)  Goal: Discharge Needs Assessment  Outcome: Ongoing (interventions implemented as appropriate)    Problem: Fall Risk (Adult)  Goal: Identify Related Risk Factors and Signs and Symptoms  Outcome: Outcome(s) achieved Date Met:  07/19/17  Goal: Absence of Falls  Outcome: Outcome(s) achieved Date Met:  07/19/17    Problem: Skin Integrity Impairment, Risk/Actual (Adult)  Goal: Identify Related Risk Factors and Signs and Symptoms  Outcome: Ongoing (interventions implemented as appropriate)    Problem: Cardiac Output, Decreased (Adult)  Goal: Identify Related Risk Factors and Signs and Symptoms  Outcome: Ongoing (interventions implemented as appropriate)  Goal: Adequate Cardiac Output/Effective Tissue Perfusion  Outcome: Ongoing (interventions implemented as appropriate)

## 2017-07-19 NOTE — PROGRESS NOTES
Continued Stay Note  [facility]     Patient Name: Luis F Plummer  MRN: [id number]  Today's Date: 7/19/2017    Admit Date: 7/5/2017          Discharge Plan       07/19/17 0929    Case Management/Social Work Plan    Plan TBD    Patient/Family In Agreement With Plan yes    Additional Comments Patient needs STR at dc and a life vest. CM has spoken with several STR facilities and they are unable to accept patient without him having a life vest. CM has had multiple conversations with both the patient and with Shawna at [facility]. We are unable at this time to procure a life vest for the patient. APRN notified. CM will follow.               Discharge Codes     None        Expected Discharge Date and Time     Expected Discharge Date Expected Discharge Time    Jul 18, 2017             Kayla Yee RN

## 2017-07-19 NOTE — THERAPY TREATMENT NOTE
Acute Care - Physical Therapy Treatment Note  [facility]     Patient Name: Luis F Plummer  : 1945  MRN: [id number]  Today's Date: 2017  Onset of Illness/Injury or Date of Surgery Date: 17  Date of Referral to PT: 17  Referring Physician: FRANCO Irizarry    Admit Date: 2017    Visit Dx:    ICD-10-CM ICD-9-CM   1. Peripheral edema R60.9 782.3   2. Pleural effusion J90 511.9   3. Acute pulmonary edema J81.0 518.4   4. Acute on chronic systolic congestive heart failure I50.23 428.23     428.0   5. Hyperlipidemia LDL goal <70 E78.5 272.4   6. Acute on chronic systolic heart failure I50.23 428.23   7. Coronary artery disease of native artery of native heart with stable angina pectoris I25.118 414.01     413.9   8. Impaired mobility and ADLs Z74.09 799.89   9. Impaired functional mobility, balance, gait, and endurance Z74.09 V49.89     Patient Active Problem List   Diagnosis   • Peripheral edema   • Acute on chronic systolic heart failure   • Coronary artery disease of native artery of native heart with stable angina pectoris   • Essential hypertension   • Hyperlipidemia LDL goal <70   • Type 2 diabetes mellitus   • CKD (chronic kidney disease)   • Peripheral neuropathy   • Cellulitis-legs/feet with chronic ulcer- no osteomyelitis on MRI   • Ischemic cardiomyopathy   • Noncompliance               Adult Rehabilitation Note       17 1516 17 1442 17 1519    Rehab Assessment/Intervention    Discipline physical therapist  -EH occupational therapist  -CL physical therapist  -EH    Document Type therapy note (daily note)  -EH therapy note (daily note)  -CL therapy note (daily note)  -EH    Subjective Information agree to therapy;complains of;pain;fatigue  -EH agree to therapy;complains of;fatigue  -CL no complaints;agree to therapy  -EH    Patient Effort, Rehab Treatment  good  -CL adequate  -EH    Symptoms Noted During/After Treatment fatigue  -EH none  -CL none  -EH    Symptoms  Noted Comment foot/LE pain continues.  -EH      Precautions/Limitations  fall precautions   Exit alarm  -CL fall precautions  -EH    Precautions/Limitations, Hearing  hearing impairment, bilaterally  -CL hearing impairment, bilaterally   [place]  -EH    Recorded by [EH] Shauna Todd, PT [CL] Basilia Gautam OT [EH] Shauna Todd, PT    Vital Signs    Pre Systolic BP Rehab  101  -CL     Pre Treatment Diastolic BP  72  -CL     Pretreatment Heart Rate (beats/min)  67  -CL     Posttreatment Heart Rate (beats/min)  74  -CL     Pre SpO2 (%)  93  -CL     O2 Delivery Pre Treatment  room air  -CL     Post SpO2 (%)  98  -CL     O2 Delivery Post Treatment  room air  -CL     Pre Patient Position  Supine  -CL     Intra Patient Position  Standing  -CL     Post Patient Position  Supine  -CL     Recorded by  [CL] Basilia Gautam OT     Pain Assessment    Pain Assessment Ansari-Simpson FACES  -EH Ansari-Baker FACES  -CL Ansari-Baker FACES  -EH    Ansari-Baker FACES Pain Rating 4  -EH 4  -CL 4  -EH    Pain Type  Acute pain  -CL     Pain Location  Leg   Pt c/o chest discomfort, RN notified.   -CL Foot  -EH    Pain Orientation  Right;Left  -CL Right;Left  -EH    Pain Intervention(s) Repositioned  -EH Repositioned;Ambulation/increased activity  -CL Ambulation/increased activity  -EH    Response to Interventions  Tolerated, RN notified.   -CL tolerated  -EH    Recorded by [EH] Shauna Todd, PT [CL] Basilia Gautam OT [EH] Shauna Todd, PT    Cognitive Assessment/Intervention    Current Cognitive/Communication Assessment functional  -EH impaired  -CL impaired  -EH    Orientation Status oriented x 4  -EH oriented x 4  -CL oriented x 4  -EH    Follows Commands/Answers Questions 100% of the time;able to follow single-step instructions  -% of the time;needs cueing;needs increased time;needs repetition  -% of the time;able to follow single-step instructions  -EH    Personal Safety mild impairment;decreased awareness, need for  assist;decreased awareness, need for safety  -EH moderate impairment;decreased awareness, need for assist;decreased awareness, need for safety;decreased insight to deficits  -CL mild impairment  -EH    Personal Safety Interventions fall prevention program maintained;gait belt;nonskid shoes/slippers when out of bed  -EH fall prevention program maintained;gait belt;nonskid shoes/slippers when out of bed  -CL fall prevention program maintained  -EH    Recorded by [EH] Shauna Todd, PT [CL] Basilia Gautam OT [EH] Shauna Todd, PT    Bed Mobility, Assessment/Treatment    Bed Mobility, Assistive Device  bed rails;head of bed elevated  -CL     Bed Mob, Supine to Sit, [place] not tested  -EH supervision required;verbal cues required  -CL     Bed Mob, Sit to Supine, [place] independent  -EH contact guard assist;verbal cues required  -CL     Bed Mobility, Comment  VCs for safety. Pt required slight assist to place BLE back into bed upon returning supine.   -CL Pt UI  -EH    Recorded by [EH] Shauna Todd PT [CL] Basilia Gautam OT [EH] Shauna Todd, PT    Transfer Assessment/Treatment    Transfers, Chair-Bed [place] contact guard assist  -EH      Transfers, Bed-Chair-Bed, Assist Device rolling walker  -EH      Transfers, Sit-Stand [place] contact guard assist;verbal cues required  -EH minimum assist (75% patient effort);verbal cues required  -CL contact guard assist;verbal cues required  -EH    Transfers, Stand-Sit [place] contact guard assist;verbal cues required  -EH contact guard assist;verbal cues required  -CL verbal cues required;contact guard assist  -EH    Transfers, Sit-Stand-Sit, Assist Device  rolling walker  -CL rolling walker  -EH    Toilet Transfer, [place]  contact guard assist;verbal cues required  -CL     Toilet Transfer, Assistive Device  rolling walker  -CL     Transfer, Safety Issues   balance decreased during turns;step length decreased;weight-shifting  ability decreased;loses balance backward  -    Transfer, Impairments   strength decreased;coordination impaired;pain;postural control impaired  -    Transfer, Comment sit<>stand performed 2x with VC needed to push up from chair 1x and reach back for chair both stand>sit reps. Pt does not reach back despite cueing.  - VCs for HP/sequencing of steps.   -CL VC for safe HP  -EH    Recorded by [EH] Shauna Todd, PT [CL] Basilia Gautam OT [EH] Shauna Todd PT    Gait Assessment/Treatment    Gait, [place] Level minimum assist (75% patient effort)  -  minimum assist (75% patient effort);verbal cues required;nonverbal cues required (demo/gesture)  -    Gait, Assistive Device rolling walker  -  rolling walker  -    Gait, Distance (Feet) 140  -EH  210  -    Gait, Gait Pattern Analysis swing-through gait  -  swing-through gait  -    Gait, Gait Deviations narrow base;[place] decreased;forward flexed posture;other (see comments)   small careful steps throughout  -  narrow base;forward flexed posture  -    Gait, Safety Issues step length decreased;weight-shifting ability decreased  -  balance decreased during turns;weight-shifting ability decreased  -    Gait, Impairments pain;postural control impaired  -  impaired balance  -    Gait, Comment safety cues to avoid hitting hand on wall/doorway   1 standing rest break  -  Pt cued for improved posture, forward gaze with minimal improvement  -EH    Recorded by [EH] Shauna Todd, PT  [EH] Shauna Todd PT    Functional Mobility    Functional Mobility- Ind. Level  contact guard assist;verbal cues required  -CL     Functional Mobility- Device  rolling walker  -CL     Functional Mobility-Distance (Feet)  120  -CL     Functional Mobility- Comment  Pt ambulated in hallway and then to bathroom, VCs for posture.   -CL     Recorded by  [CL] Basilia Gautam OT     Toileting Assessment/Training    Toileting Assess/Train, Position   sitting;standing  -CL     Toileting Assess/Train, Indepen Level  verbal cues required  -CL     Toileting Assess/Train, Comment  Pt Max A for clothing management and Min A for post toilet hygiene, pt required assist for thoroughness.   -CL     Recorded by  [CL] Basilia Gautam OT     Grooming Assessment/Training    Grooming Assess/Train, Position  sitting  -CL     Grooming Assess/Train, Indepen Level  set up required  -CL     Grooming Assess/Train, Comment  Pt washed hands w/ sanitizing wipe.   -CL     Recorded by  [CL] Basilia Gautam OT     Motor Skills/Interventions    Additional Documentation  Balance Skills Training (Group)  -CL Balance Skills Training (Group)  -EH    Recorded by  [CL] Basilia Gautam OT [EH] Shauna Todd, PT    Balance Skills Training    Sitting-Level of Assistance  Close supervision  -CL     Sitting-Balance Support  Right upper extremity supported;Left upper extremity supported;Feet supported  -CL     Sitting-Balance Activities  Forward lean;Reaching for objects;Trunk control activities  -CL     Standing-Level of Assistance  Contact guard  -CL Contact guard  -EH    Static Standing Balance Support  assistive device  -CL assistive device  -EH    Standing-Balance Activities  Weight Shift A-P;Weight Shift R-L;Reaching for objects  -CL     Gait Balance-Level of Assistance  Contact guard  -CL Contact guard  -EH    Gait Balance Support  assistive device  -CL assistive device  -EH    Gait Balance Activities  scanning environment R/L;side-stepping  -CL scanning environment R/L  -EH    Recorded by  [CL] Basilia Gautam OT [EH] Shauna Todd, PT    Therapy Exercises    Bilateral Lower Extremities AROM:;10 reps;ankle pumps/circles;LAQ;SLR;hip ER;hip IR   isometric hip adduction with pillow  -EH      Recorded by [EH] Shauna Todd, PT      Positioning and Restraints    Pre-Treatment Position sitting in chair/recliner  -EH in bed  -CL sitting in chair/recliner  -EH    Post Treatment Position bed  -EH  bed  -CL chair  -EH    In Bed notified nsg;supine;encouraged to call for assist;exit alarm on;call light within reach  -EH      In Chair  notified nsg;reclined;call light within reach;encouraged to call for assist;exit alarm on  -CL notified nsg;reclined;call light within reach;encouraged to call for assist;exit alarm on  -EH    Recorded by [EH] Shauna Todd, PT [CL] Basilia Gautam, OT [EH] Shauna Todd, PT      User Key  (r) = Recorded By, (t) = Taken By, (c) = Cosigned By    Initials Name Effective Dates     Shauna Todd, PT 06/19/15 -     CL Basilia Gautam, OT 06/08/16 -                 IP PT Goals       07/19/17 1552 07/17/17 1557 07/16/17 1404    Transfer Training PT LTG    Transfer Training PT LTG, Outcome goal ongoing  - goal ongoing  - goal ongoing  -MB    Gait Training PT LTG    Gait Training Goal PT LTG, Outcome goal ongoing  - goal ongoing  - goal ongoing  -MB    Dynamic Standing Balance PT LTG    Dynamic Standing Balance PT LTG, Outcome goal ongoing  -  goal ongoing  -MB      07/14/17 1314 07/12/17 1415 07/11/17 1420    Transfer Training PT LTG    Transfer Training PT LTG, Outcome goal ongoing  - goal ongoing  - goal ongoing  -EH    Gait Training PT LTG    Gait Training Goal PT LTG, Outcome goal ongoing  - goal ongoing  - goal ongoing  -EH    Dynamic Standing Balance PT LTG    Dynamic Standing Balance PT LTG, Outcome goal ongoing  -  goal ongoing  -EH      07/10/17 1152 07/09/17 1207 07/06/17 1410    Transfer Training PT LTG    Transfer Training PT LTG, Date Established   07/06/17  -SJ    Transfer Training PT LTG, Time to Achieve   2 wks  -SJ    Transfer Training PT LTG, Activity Type   bed to chair /chair to bed;sit to stand/stand to sit  -SJ    Transfer Training PT LTG, [place] Level   supervision required  -SJ    Transfer Training PT LTG, Assist Device   walker, rolling  -SJ    Transfer Training PT LTG, Outcome goal ongoing  -DAVE (r) KR (t) DAVE (c)  goal ongoing   -SJ    Gait Training PT LTG    Gait Training Goal PT LTG, Date Established   07/06/17  -SJ    Gait Training Goal PT LTG, Time to Achieve   2 wks  -SJ    Gait Training Goal PT LTG, [place] Level   supervision required  -SJ    Gait Training Goal PT LTG, Assist Device   walker, rolling  -SJ    Gait Training Goal PT LTG, Distance to Achieve   200  -SJ    Gait Training Goal PT LTG, Outcome goal ongoing  -DAVE (r) KR (t) DAVE (c) goal ongoing  -LS goal ongoing  -SJ    Dynamic Standing Balance PT LTG    Dynamic Standing Balance PT LTG, Date Established   07/06/17  -SJ    Dynamic Standing Balance PT LTG, Time to Achieve   2 wks  -SJ    Dynamic Standing Balance PT LTG, [place] Level  conditional independence  -LS contact guard assist  -SJ    Dynamic Standing Balance PT LTG, Assist Device  assistive Device  -LS     Dynamic Standing Balance PT LTG, Date Goal Reviewed  07/09/17   previous goal achieved 7/9  -LS     Dynamic Standing Balance PT LTG, Outcome goal ongoing  -DAVE (r) KR (t) DAVE (c) goal revised  -LS goal ongoing  -SJ      User Key  (r) = Recorded By, (t) = Taken By, (c) = Cosigned By    Initials Name Provider Type    DAVE Mason, PT Physical Therapist     Shauna Todd, PT Physical Therapist    SJ Sakshi Gutierrez, PT Physical Therapist    LS Shae Robledo, PT Physical Therapist    MB Arlene Nixon, PT Physical Therapist    MADI Shen, PT Student PT Student          Physical Therapy Education     Title: PT OT SLP Therapies (Active)     Topic: Physical Therapy (Done)     Point: Mobility training (Done)    Learning Progress Summary    Learner Readiness Method Response Comment Documented by Status   Patient Acceptance E VU,NR   07/19/17 1552 Done    Acceptance E NR   07/17/17 1556 Active    Nonacceptance E,D NR home safety, fall precautions, benefits of activity/therapy MB 07/16/17 1403 Active    Acceptance E NR   07/14/17 1314 Active    Acceptance E VU,NR   07/12/17 1414 Done     Acceptance E NR   07/11/17 1420 Active    Acceptance E NR   07/10/17 1152 Active    Acceptance E,D NR   07/09/17 1207 Active    Acceptance E NR   07/06/17 1412 Active               Point: Home exercise program (Done)    Learning Progress Summary    Learner Readiness Method Response Comment Documented by Status   Patient Acceptance E VU,NR   07/19/17 1552 Done    Acceptance E NR   07/17/17 1556 Active    Nonacceptance E,D NR home safety, fall precautions, benefits of activity/therapy MB 07/16/17 1403 Active    Acceptance E NR   07/14/17 1314 Active    Acceptance E VU,NR   07/12/17 1414 Done    Acceptance E NR   07/11/17 1420 Active    Acceptance E,D NR   07/09/17 1207 Active               Point: Body mechanics (Done)    Learning Progress Summary    Learner Readiness Method Response Comment Documented by Status   Patient Acceptance E VU,NR   07/19/17 1552 Done    Acceptance E NR   07/17/17 1556 Active    Nonacceptance E,D NR home safety, fall precautions, benefits of activity/therapy MB 07/16/17 1403 Active    Acceptance E NR   07/14/17 1314 Active    Acceptance E VU,NR   07/12/17 1414 Done    Acceptance E NR   07/11/17 1420 Active    Acceptance E NR   07/10/17 1152 Active    Acceptance E,D NR   07/09/17 1207 Active    Acceptance E NR   07/06/17 1412 Active               Point: Precautions (Done)    Learning Progress Summary    Learner Readiness Method Response Comment Documented by Status   Patient Acceptance E VU,NR   07/19/17 1552 Done    Acceptance E NR   07/17/17 1556 Active    Nonacceptance E,D NR home safety, fall precautions, benefits of activity/therapy MB 07/16/17 1403 Active    Acceptance E NR   07/14/17 1314 Active    Acceptance E VU,NR   07/12/17 1414 Done    Acceptance E NR   07/11/17 1420 Active    Acceptance E NR   07/10/17 1152 Active    Acceptance E,D NR   07/09/17 1207 Active    Acceptance E NR   07/06/17 1412 Active                      User Key      Initials Effective Dates Name Provider Type Discipline     06/19/15 -  Shauna Todd, PT Physical Therapist PT    SJ 06/19/15 -  Sakshi Gutierrez, PT Physical Therapist PT    LS 06/19/15 -  Shae Robledo, PT Physical Therapist PT    MB 03/14/16 -  Arlene Nixon, PT Physical Therapist PT    KR 05/30/17 -  Vijaya Shen, PT Student PT Student PT                    PT Recommendation and Plan  Anticipated Discharge Disposition: inpatient rehabilitation facility  Planned Therapy Interventions: balance training, bed mobility training, gait training, home exercise program, patient/family education, strengthening, transfer training  PT Frequency: daily  Plan of Care Review  Plan Of Care Reviewed With: patient  Progress: improving  Outcome Summary/Follow up Plan: Pt continues to need safety cues for t/f. Ambulation distance limited this date 2/2 foot pain to 140 ft with RWx.           Outcome Measures       07/19/17 1516 07/18/17 1442 07/17/17 1519    How much help from another person do you currently need...    Turning from your back to your side while in flat bed without using bedrails? 4  -EH  4  -EH    Moving from lying on back to sitting on the side of a flat bed without bedrails? 3  -EH  3  -EH    Moving to and from a bed to a chair (including a wheelchair)? 3  -EH  3  -EH    Standing up from a chair using your arms (e.g., wheelchair, bedside chair)? 3  -EH  3  -EH    Climbing 3-5 steps with a railing? 2  -EH  2  -EH    To walk in hospital room? 3  -EH  3  -EH    AM-PAC 6 Clicks Score 18  -EH  18  -EH    How much help from another is currently needed...    Putting on and taking off regular lower body clothing?  2  -CL     Bathing (including washing, rinsing, and drying)  2  -CL     Toileting (which includes using toilet bed pan or urinal)  2  -CL     Putting on and taking off regular upper body clothing  3  -CL     Taking care of personal grooming (such as brushing teeth)  4  -CL     Eating meals  4  -CL      Score  17  -CL     Functional Assessment    Outcome Measure Options AM-PAC 6 Clicks Basic Mobility (PT)  - AM-PAC 6 Clicks Daily Activity (OT)  -CL AM-PAC 6 Clicks Basic Mobility (PT)  -      User Key  (r) = Recorded By, (t) = Taken By, (c) = Cosigned By    Initials Name Provider Type     Shauna Todd, PT Physical Therapist    CL Basilia Gautam, OT Occupational Therapist           Time Calculation:         PT Charges       07/19/17 1555          Time Calculation    Start Time 1516  -      PT Received On 07/19/17  -      PT Goal Re-Cert Due Date 07/21/17  -      Time Calculation- PT    Total Timed Code Minutes- PT 32 minute(s)  -        User Key  (r) = Recorded By, (t) = Taken By, (c) = Cosigned By    Initials Name Provider Type     Shauna Todd, PT Physical Therapist          Therapy Charges for Today     Code Description Service Date Service Provider Modifiers Qty    21431319645 HC GAIT TRAINING EA 15 MIN 7/19/2017 Shauna Todd, PT GP 1    81323959816 HC PT THER PROC EA 15 MIN 7/19/2017 Shauna Todd, PT GP 1          PT G-Codes  Outcome Measure Options: AM-PAC 6 Clicks Basic Mobility (PT)    Shuana Todd, PT  7/19/2017

## 2017-07-19 NOTE — PROGRESS NOTES
[facility] Medicine Services  INPATIENT PROGRESS NOTE    Date of Admission: 7/5/2017  Length of Stay: 13  Primary Care Physician: No Known Provider    Subjective   CC: LE edema    HPI:  Sitting up in chair eating lunch. NAD.  No new complaints.     Review Of Systems:   Review of Systems   Constitutional: Negative for fever.   Cardiovascular: Positive for chest pain and leg swelling.   Gastrointestinal: Negative for abdominal pain.   Skin: Positive for wound.   All other systems reviewed and are negative.      Objective      Temp:  [97.5 °F (36.4 °C)-97.9 °F (36.6 °C)] 97.9 °F (36.6 °C)  Heart Rate:  [68-74] 68  Resp:  [16-18] 16  BP: (105-138)/(64-66) 138/66  Physical Exam   Constitutional: He appears well-developed and well-nourished. No distress.   HENT:   Head: Normocephalic.   Eyes: Pupils are equal, round, and reactive to light.   Cardiovascular: Normal rate, regular rhythm and normal heart sounds.    No murmur heard.  Pulmonary/Chest: Effort normal and breath sounds normal. No respiratory distress.   diminshed at the bases   Abdominal: Soft. Bowel sounds are normal. He exhibits no distension. There is no tenderness.   Musculoskeletal: He exhibits no edema.   Neurological: He is alert.   Seems oriented, very [place]  No focal deficits   Skin: Skin is warm and dry.   Some excoriations and scabbing on LEs   Psychiatric: He has a normal mood and affect.   Vitals reviewed.  Exam is unchanged    Results Review:    I have reviewed the labs, radiology results and diagnostic studies.          Results from last 7 days  Lab Units 07/17/17  0812   SODIUM mmol/L 136   POTASSIUM mmol/L 4.4   CHLORIDE mmol/L 95*   CO2 mmol/L 30.0   BUN mg/dL 47*   CREATININE mg/dL 1.40*   GLUCOSE mg/dL 126*   CALCIUM mg/dL 9.9       Culture Data: none     Radiology Data:   All imaging reviewed    Echo - EF=20%    I have reviewed the medications.    Assessment/Plan     Problem List  Hospital Problem List     *  (Principal)Acute on chronic systolic heart failure    Overview Signed 7/6/2017 11:09 AM by FRANCO Wood     · NYHA Class II         Peripheral edema    Coronary artery disease of native artery of native heart with stable angina pectoris    Overview Addendum 7/6/2017 11:06 AM by FRANCO Wood     · Cardiac catheterization at [facility] (01/26/2015): Multivessel disease.  Data deficit   · Coronary artery bypass with Dr. Rodríguez (01/27/2015): LIMA to LAD.  SVG to OM.  SVG to PDA.  LVEF 20-25%.  · Primary cardiologist is Dr. Bautista         Essential hypertension    Hyperlipidemia LDL goal <70    Overview Addendum 7/6/2017 11:09 AM by FRANCO Wood     · High-intensity statin therapy indicated given presence coronary artery disease           Type 2 diabetes mellitus    CKD (chronic kidney disease)    Peripheral neuropathy    Cellulitis-legs/feet with chronic ulcer- no osteomyelitis on MRI    Ischemic cardiomyopathy    Noncompliance        Assessment/Plan:  - clinically much improved from admission.  Has diuresed up to 23L based on I/Os.  PO bumex resumed, renal function stable  - EF=20%, on BB, ARB, statin.    - Order placed for lifevest, however the rep says the patient has had one previously and never turned in.  Attempted to call family to try to find.  Plan was to f/u with [facility] cardiologist to determine need for ICD in future. [employer] rep Shawna Resendiz tried to arrange different options since patient cannot afford new vest, and no one can locate previous vest or documentation of patient ever returning it.   -Will consult EP today while inpatient to determine need for ICD since there is no clear answer to IF or WHEN the patient will receive a life vest due to cost situation in order to make a decision about discharge. NO facility will take patient without the life vest in place.   - had some hypoglycemia and adjusted prandial doses, now good control and will leave  on current regimen.   - patient with noted tick on arrival.  On doxy planned 14d (11/14 today).  Should also cover cellulitis seen on MRI.  - pleural effusion, mostly resolved with diuresis, no need for thoracentesis at this point  - BMP/ CBC in am   - reviewed CM notes, working on placement. [facility] has approved for bed, ONLY with life vest due to medical necessity. CM cont to follow.     High complexity.    DVT prophylaxis: Heparin      Ese Stoddard, APRN   07/19/17   3:07 PM

## 2017-07-19 NOTE — PLAN OF CARE
Problem: Patient Care Overview (Adult)  Goal: Plan of Care Review  Outcome: Ongoing (interventions implemented as appropriate)    07/19/17 1552   Coping/Psychosocial Response Interventions   Plan Of Care Reviewed With patient   Outcome Evaluation   Outcome Summary/Follow up Plan Pt continues to need safety cues for t/f. Ambulation distance limited this date 2/2 foot pain to 140 ft with RWx.          Problem: Inpatient Physical Therapy  Goal: Transfer Training Goal 1 LTG- PT  Outcome: Ongoing (interventions implemented as appropriate)    07/06/17 1410 07/19/17 1552   Transfer Training PT LTG   Transfer Training PT LTG, Date Established 07/06/17 --    Transfer Training PT LTG, Time to Achieve 2 wks --    Transfer Training PT LTG, Activity Type bed to chair /chair to bed;sit to stand/stand to sit --    Transfer Training PT LTG, [place] Level supervision required --    Transfer Training PT LTG, Assist Device walker, rolling --    Transfer Training PT LTG, Outcome --  goal ongoing       Goal: Gait Training Goal LTG- PT  Outcome: Ongoing (interventions implemented as appropriate)    07/06/17 1410 07/19/17 1552   Gait Training PT LTG   Gait Training Goal PT LTG, Date Established 07/06/17 --    Gait Training Goal PT LTG, Time to Achieve 2 wks --    Gait Training Goal PT LTG, [place] Level supervision required --    Gait Training Goal PT LTG, Assist Device walker, rolling --    Gait Training Goal PT LTG, Distance to Achieve 200 --    Gait Training Goal PT LTG, Outcome --  goal ongoing       Goal: Dynamic Standing Balance Goal LTG- PT  Outcome: Ongoing (interventions implemented as appropriate)    07/06/17 1410 07/09/17 1207 07/19/17 1552   Dynamic Standing Balance PT LTG   Dynamic Standing Balance PT LTG, Date Established 07/06/17 --  --    Dynamic Standing Balance PT LTG, Time to Achieve 2 wks --  --    Dynamic Standing Balance PT LTG, [place] Level --  conditional independence --    Dynamic Standing Balance  PT LTG, Assist Device --  assistive Device --    Dynamic Standing Balance PT LTG, Date Goal Reviewed --  07/09/17  (previous goal achieved 7/9) --    Dynamic Standing Balance PT LTG, Outcome --  --  goal ongoing

## 2017-07-20 LAB
ANION GAP SERPL CALCULATED.3IONS-SCNC: 13 MMOL/L (ref 3–11)
BASOPHILS # BLD AUTO: 0.02 10*3/MM3 (ref 0–0.2)
BASOPHILS NFR BLD AUTO: 0.3 % (ref 0–1)
BUN BLD-MCNC: 78 MG/DL (ref 9–23)
BUN/CREAT SERPL: 43.3 (ref 7–25)
CALCIUM SPEC-SCNC: 9.1 MG/DL (ref 8.7–10.4)
CHLORIDE SERPL-SCNC: 98 MMOL/L (ref 99–109)
CO2 SERPL-SCNC: 23 MMOL/L (ref 20–31)
CREAT BLD-MCNC: 1.8 MG/DL (ref 0.6–1.3)
DEPRECATED RDW RBC AUTO: 53 FL (ref 37–54)
EOSINOPHIL # BLD AUTO: 0.38 10*3/MM3 (ref 0–0.3)
EOSINOPHIL NFR BLD AUTO: 5.3 % (ref 0–3)
ERYTHROCYTE [DISTWIDTH] IN BLOOD BY AUTOMATED COUNT: 16.4 % (ref 11.3–14.5)
GFR SERPL CREATININE-BSD FRML MDRD: 37 ML/MIN/1.73
GLUCOSE BLD-MCNC: 138 MG/DL (ref 70–100)
GLUCOSE BLDC GLUCOMTR-MCNC: 125 MG/DL (ref 70–130)
GLUCOSE BLDC GLUCOMTR-MCNC: 211 MG/DL (ref 70–130)
GLUCOSE BLDC GLUCOMTR-MCNC: 233 MG/DL (ref 70–130)
HCT VFR BLD AUTO: 41.3 % (ref 38.9–50.9)
HGB BLD-MCNC: 13.2 G/DL (ref 13.1–17.5)
IMM GRANULOCYTES # BLD: 0.07 10*3/MM3 (ref 0–0.03)
IMM GRANULOCYTES NFR BLD: 1 % (ref 0–0.6)
LYMPHOCYTES # BLD AUTO: 1.7 10*3/MM3 (ref 0.6–4.8)
LYMPHOCYTES NFR BLD AUTO: 23.8 % (ref 24–44)
MCH RBC QN AUTO: 28.2 PG (ref 27–31)
MCHC RBC AUTO-ENTMCNC: 32 G/DL (ref 32–36)
MCV RBC AUTO: 88.2 FL (ref 80–99)
MONOCYTES # BLD AUTO: 0.51 10*3/MM3 (ref 0–1)
MONOCYTES NFR BLD AUTO: 7.1 % (ref 0–12)
NEUTROPHILS # BLD AUTO: 4.46 10*3/MM3 (ref 1.5–8.3)
NEUTROPHILS NFR BLD AUTO: 62.5 % (ref 41–71)
PLATELET # BLD AUTO: 215 10*3/MM3 (ref 150–450)
PMV BLD AUTO: 10 FL (ref 6–12)
POTASSIUM BLD-SCNC: 4.7 MMOL/L (ref 3.5–5.5)
RBC # BLD AUTO: 4.68 10*6/MM3 (ref 4.2–5.76)
SODIUM BLD-SCNC: 134 MMOL/L (ref 132–146)
WBC NRBC COR # BLD: 7.14 10*3/MM3 (ref 3.5–10.8)

## 2017-07-20 PROCEDURE — 85025 COMPLETE CBC W/AUTO DIFF WBC: CPT | Performed by: NURSE PRACTITIONER

## 2017-07-20 PROCEDURE — 82962 GLUCOSE BLOOD TEST: CPT

## 2017-07-20 PROCEDURE — 97110 THERAPEUTIC EXERCISES: CPT

## 2017-07-20 PROCEDURE — 80048 BASIC METABOLIC PNL TOTAL CA: CPT | Performed by: NURSE PRACTITIONER

## 2017-07-20 PROCEDURE — 25010000002 HEPARIN (PORCINE) PER 1000 UNITS: Performed by: NURSE PRACTITIONER

## 2017-07-20 PROCEDURE — 63710000001 INSULIN DETEMIR PER 5 UNITS: Performed by: HOSPITALIST

## 2017-07-20 PROCEDURE — 99232 SBSQ HOSP IP/OBS MODERATE 35: CPT | Performed by: NURSE PRACTITIONER

## 2017-07-20 RX ORDER — BUMETANIDE 1 MG/1
1 TABLET ORAL 2 TIMES DAILY
Status: DISCONTINUED | OUTPATIENT
Start: 2017-07-21 | End: 2017-07-21 | Stop reason: HOSPADM

## 2017-07-20 RX ADMIN — INSULIN LISPRO 4 UNITS: 100 INJECTION, SOLUTION INTRAVENOUS; SUBCUTANEOUS at 21:16

## 2017-07-20 RX ADMIN — ATORVASTATIN CALCIUM 20 MG: 20 TABLET, FILM COATED ORAL at 21:14

## 2017-07-20 RX ADMIN — DOXYCYCLINE HYCLATE 100 MG: 100 CAPSULE ORAL at 21:15

## 2017-07-20 RX ADMIN — HEPARIN SODIUM 5000 UNITS: 5000 INJECTION, SOLUTION INTRAVENOUS; SUBCUTANEOUS at 21:15

## 2017-07-20 RX ADMIN — HEPARIN SODIUM 5000 UNITS: 5000 INJECTION, SOLUTION INTRAVENOUS; SUBCUTANEOUS at 09:50

## 2017-07-20 RX ADMIN — ASPIRIN 81 MG: 81 TABLET, COATED ORAL at 09:49

## 2017-07-20 RX ADMIN — NYSTATIN: 100000 POWDER TOPICAL at 09:51

## 2017-07-20 RX ADMIN — CARVEDILOL 3.12 MG: 3.12 TABLET, FILM COATED ORAL at 21:13

## 2017-07-20 RX ADMIN — DOXYCYCLINE HYCLATE 100 MG: 100 CAPSULE ORAL at 09:50

## 2017-07-20 RX ADMIN — LOSARTAN POTASSIUM 50 MG: 50 TABLET, FILM COATED ORAL at 09:49

## 2017-07-20 RX ADMIN — CARVEDILOL 3.12 MG: 3.12 TABLET, FILM COATED ORAL at 09:49

## 2017-07-20 RX ADMIN — PANTOPRAZOLE SODIUM 40 MG: 40 TABLET, DELAYED RELEASE ORAL at 05:21

## 2017-07-20 RX ADMIN — INSULIN LISPRO 4 UNITS: 100 INJECTION, SOLUTION INTRAVENOUS; SUBCUTANEOUS at 17:52

## 2017-07-20 RX ADMIN — LEVOTHYROXINE SODIUM 50 MCG: 50 TABLET ORAL at 05:21

## 2017-07-20 RX ADMIN — NYSTATIN: 100000 POWDER TOPICAL at 21:25

## 2017-07-20 RX ADMIN — INSULIN DETEMIR 5 UNITS: 100 INJECTION, SOLUTION SUBCUTANEOUS at 21:15

## 2017-07-20 NOTE — THERAPY TREATMENT NOTE
Acute Care - Physical Therapy Treatment Note  [facility]     Patient Name: Luis F Plummer  : 1945  MRN: [id number]  Today's Date: 2017  Onset of Illness/Injury or Date of Surgery Date: 17  Date of Referral to PT: 17  Referring Physician: FRANCO Irizarry    Admit Date: 2017    Visit Dx:    ICD-10-CM ICD-9-CM   1. Peripheral edema R60.9 782.3   2. Pleural effusion J90 511.9   3. Acute pulmonary edema J81.0 518.4   4. Acute on chronic systolic congestive heart failure I50.23 428.23     428.0   5. Hyperlipidemia LDL goal <70 E78.5 272.4   6. Acute on chronic systolic heart failure I50.23 428.23   7. Coronary artery disease of native artery of native heart with stable angina pectoris I25.118 414.01     413.9   8. Impaired mobility and ADLs Z74.09 799.89   9. Impaired functional mobility, balance, gait, and endurance Z74.09 V49.89     Patient Active Problem List   Diagnosis   • Peripheral edema   • Acute on chronic systolic heart failure   • Coronary artery disease of native artery of native heart with stable angina pectoris   • Essential hypertension   • Hyperlipidemia LDL goal <70   • Type 2 diabetes mellitus   • CKD (chronic kidney disease)   • Peripheral neuropathy   • Cellulitis-legs/feet with chronic ulcer- no osteomyelitis on MRI   • Ischemic cardiomyopathy   • Noncompliance               Adult Rehabilitation Note       17 1130 17 1516 17 1442    Rehab Assessment/Intervention    Discipline physical therapist  -DAVE physical therapist  -EH occupational therapist  -CL    Document Type therapy note (daily note)  -DAVE therapy note (daily note)  - therapy note (daily note)  -CL    Subjective Information no complaints;agree to therapy  -DAVE agree to therapy;complains of;pain;fatigue  - agree to therapy;complains of;fatigue  -CL    Patient Effort, Rehab Treatment   good  -CL    Symptoms Noted During/After Treatment increased pain  -DAVE fatigue  - none  -CL    Symptoms  Noted Comment  foot/LE pain continues.  -EH     Precautions/Limitations   fall precautions   Exit alarm  -CL    Precautions/Limitations, Hearing   hearing impairment, bilaterally  -CL    Recorded by [DAVE] Abbi Mason, PT [EH] Shauna Todd, PT [CL] Basilia Gautam, NANCI    Vital Signs    Pre Systolic BP Rehab   101  -CL    Pre Treatment Diastolic BP   72  -CL    Pretreatment Heart Rate (beats/min)   67  -CL    Posttreatment Heart Rate (beats/min)   74  -CL    Pre SpO2 (%)   93  -CL    O2 Delivery Pre Treatment   room air  -CL    Post SpO2 (%)   98  -CL    O2 Delivery Post Treatment   room air  -CL    Pre Patient Position   Supine  -CL    Intra Patient Position   Standing  -CL    Post Patient Position   Supine  -CL    Recorded by   [CL] Basilia Gautam OT    Pain Assessment    Pain Assessment 0-10  -DAVE Ansari-Simpson FACES  -EH Ansari-Baker FACES  -CL    Ansari-Simpson FACES Pain Rating 2  -DAVE 4  -EH 4  -CL    Pain Score 2  -DAVE      Post Pain Score 3  -DAVE      Pain Type   Acute pain  -CL    Pain Location Leg  -DAVE  Leg   Pt c/o chest discomfort, RN notified.   -CL    Pain Orientation Right;Left  -DAVE  Right;Left  -CL    Pain Intervention(s) Repositioned;Ambulation/increased activity  -DAVE Repositioned  -EH Repositioned;Ambulation/increased activity  -CL    Response to Interventions   [place], RN notified.   -CL    Recorded by [DAVE] Abbi Mason, PT [EH] Shauna Todd, PT [CL] Basilia Gautam OT    Cognitive Assessment/Intervention    Current Cognitive/Communication Assessment functional  -DAVE functional  -EH impaired  -CL    Orientation Status oriented x 4  -DAVE oriented x 4  -EH oriented x 4  -CL    Follows Commands/Answers Questions 100% of the time  -DAVE 100% of the time;able to follow single-step instructions  - 100% of the time;needs cueing;needs increased time;needs repetition  -CL    Personal Safety mild impairment;decreased awareness, need for assist;decreased awareness, need for safety  -DAVE mild  impairment;decreased awareness, need for assist;decreased awareness, need for safety  -EH moderate impairment;decreased awareness, need for assist;decreased awareness, need for safety;decreased insight to deficits  -CL    Personal Safety Interventions fall prevention program maintained;gait belt;nonskid shoes/slippers when out of bed  -DAVE fall prevention program maintained;gait belt;nonskid shoes/slippers when out of bed  -EH fall prevention program maintained;gait belt;nonskid shoes/slippers when out of bed  -CL    Recorded by [DAVE] Abbi Mason, PT [EH] Shauna Todd, PT [CL] Basilia Gautam, OT    Bed Mobility, Assessment/Treatment    Bed Mobility, Assistive Device   bed rails;head of bed elevated  -CL    Bed Mob, Supine to Sit, [place] contact guard assist  -DAVE not tested  - supervision required;verbal cues required  -CL    Bed Mob, Sit to Supine, [place]  independent  -EH contact guard assist;verbal cues required  -CL    Bed Mobility, Impairments strength decreased  -DAVE      Bed Mobility, Comment   VCs for safety. Pt required slight assist to place BLE back into bed upon returning supine.   -CL    Recorded by [DAVE] Abbi Mason, PT [EH] Shauna Todd, PT [CL] Basilia Gautam, OT    Transfer Assessment/Treatment    Transfers, Chair-Bed [place]  contact guard assist  -     Transfers, Bed-Chair-Bed, Assist Device  rolling walker  -EH     Transfers, Sit-Stand [place] contact guard assist  -DAVE contact guard assist;verbal cues required  - minimum assist (75% patient effort);verbal cues required  -CL    Transfers, Stand-Sit [place] contact guard assist  -DAVE contact guard assist;verbal cues required  - contact guard assist;verbal cues required  -CL    Transfers, Sit-Stand-Sit, Assist Device rolling walker  -DAVE  rolling walker  -CL    Toilet Transfer, [place]   contact guard assist;verbal cues required  -CL    Toilet Transfer, Assistive Device   rolling walker  -CL     Transfer, Comment  sit<>stand performed 2x with VC needed to push up from chair 1x and reach back for chair both stand>sit reps. Pt does not reach back despite cueing.  - VCs for HP/sequencing of steps.   -CL    Recorded by [DAVE] Abbi Mason, PT [EH] Shauna Todd, PT [CL] Basilia Gautam OT    Gait Assessment/Treatment    Gait, [place] Level contact guard assist  - minimum assist (75% patient effort)  -     Gait, Assistive Device rolling walker  -DAVE rolling walker  -     Gait, Distance (Feet) 160  -DAVE 140  -     Gait, Gait Pattern Analysis swing-through gait  -DAVE swing-through gait  -     Gait, Gait Deviations step length decreased;narrow base  -DAVE narrow base;[place] decreased;forward flexed posture;other (see comments)   small careful steps throughout  -     Gait, Safety Issues  step length decreased;weight-shifting ability decreased  -     Gait, Impairments  pain;postural control impaired  -     Gait, Comment patient able to propel walker straighter today   -DAVE safety cues to avoid hitting hand on wall/doorway   1 standing rest break  -EH     Recorded by [DAVE] Abbi Mason, PT [EH] Shauna Todd, PT     Functional Mobility    Functional Mobility- Ind. Level   contact guard assist;verbal cues required  -CL    Functional Mobility- Device   rolling walker  -CL    Functional Mobility-Distance (Feet)   120  -CL    Functional Mobility- Comment   Pt ambulated in hallway and then to bathroom, VCs for posture.   -CL    Recorded by   [CL] Basilia Gautam OT    Toileting Assessment/Training    Toileting Assess/Train, Position   sitting;standing  -CL    Toileting Assess/Train, Indepen Level   verbal cues required  -CL    Toileting Assess/Train, Comment   Pt Max A for clothing management and Min A for post toilet hygiene, pt required assist for thoroughness.   -CL    Recorded by   [CL] Basilia Gautam OT    Grooming Assessment/Training    Grooming Assess/Train, Position   sitting  -CL     Grooming Assess/Train, Indepen Level   set up required  -CL    Grooming Assess/Train, Comment   Pt washed hands w/ sanitizing wipe.   -CL    Recorded by   [CL] Basilia Gautam OT    Motor Skills/Interventions    Additional Documentation   Balance Skills Training (Group)  -CL    Recorded by   [CL] Basilia Gautam OT    Balance Skills Training    Sitting-Level of Assistance Independent  -DAVE  Close supervision  -CL    Sitting-Balance Support Feet supported  -DAVE  Right upper extremity supported;Left upper extremity supported;Feet supported  -CL    Sitting-Balance Activities Trunk control activities  -DAVE  Forward lean;Reaching for objects;Trunk control activities  -CL    Standing-Level of Assistance Contact guard  -DAVE  Contact guard  -CL    Static Standing Balance Support assistive device  -DAVE  assistive device  -CL    Standing-Balance Activities Weight Shift A-P;Weight Shift R-L  -DAVE  Weight Shift A-P;Weight Shift R-L;Reaching for objects  -CL    Gait Balance-Level of Assistance Contact guard  -DAVE  Contact guard  -CL    Gait Balance Support assistive device  -DAVE  assistive device  -CL    Gait Balance Activities   scanning environment R/L;side-stepping  -CL    Recorded by [DAVE] Abbi Mason, PT  [CL] Basilia Gautam OT    Therapy Exercises    Bilateral Lower Extremities AROM:;10 reps;sitting;ankle pumps/circles;LAQ  -DAVE AROM:;10 reps;ankle pumps/circles;LAQ;SLR;hip ER;hip IR   isometric hip adduction with pillow  -EH     Recorded by [DAVE] Abbi Mason, PT [EH] Shauna Todd, PT     Positioning and Restraints    Pre-Treatment Position in bed  -DAVE sitting in chair/recliner  - in bed  -CL    Post Treatment Position chair  -DAVE bed  -EH bed  -CL    In Bed  notified nsg;supine;encouraged to call for assist;exit alarm on;call light within reach  -EH     In Chair notified nsg;reclined;sitting;call light within reach;exit alarm on  -DAVE  notified nsg;reclined;call light within reach;encouraged to call for assist;exit alarm  on  -CL    Recorded by [DAVE] Abbi Mason, PT [EH] Shauna Todd, PT [CL] Basilia Gautam OT      07/17/17 5892          Rehab Assessment/Intervention    Discipline physical therapist  -      Document Type therapy note (daily note)  -      Subjective Information no complaints;agree to therapy  -      Patient Effort, Rehab Treatment adequate  -EH      Symptoms Noted During/After Treatment none  -EH      Precautions/Limitations fall precautions  -      Precautions/Limitations, Hearing hearing impairment, bilaterally   [place]  -EH      Recorded by [] Shauna Todd, PT      Pain Assessment    Pain Assessment Ansari-Simpson FACES  -      Ansari-Baker FACES Pain Rating 4  -      Pain Location Foot  -      Pain Orientation Right;Left  -      Pain Intervention(s) Ambulation/increased activity  -      Response to Interventions tolerated  -EH      Recorded by [] Shauna Todd, PT      Cognitive Assessment/Intervention    Current Cognitive/Communication Assessment impaired  -      Orientation Status oriented x 4  -      Follows Commands/Answers Questions 100% of the time;able to follow single-step instructions  -      Personal Safety mild impairment  -      Personal Safety Interventions fall prevention program maintained  -EH      Recorded by [] Shauna Todd, PT      Bed Mobility, Assessment/Treatment    Bed Mobility, Comment Pt UIC  -EH      Recorded by [] Shauna Todd PT      Transfer Assessment/Treatment    Transfers, Sit-Stand [place] contact guard assist;verbal cues required  -      Transfers, Stand-Sit [place] verbal cues required;contact guard assist  -      Transfers, Sit-Stand-Sit, Assist Device rolling walker  -      Transfer, Safety Issues balance decreased during turns;step length decreased;weight-shifting ability decreased;loses balance backward  -      Transfer, Impairments strength decreased;coordination impaired;pain;postural control  impaired  -EH      Transfer, Comment VC for safe HP  -EH      Recorded by [] Shauna Todd PT      Gait Assessment/Treatment    Gait, [place] Level minimum assist (75% patient effort);verbal cues required;nonverbal cues required (demo/gesture)  -      Gait, Assistive Device rolling walker  -      Gait, Distance (Feet) 210  -      Gait, Gait Pattern Analysis swing-through gait  -      Gait, Gait Deviations narrow base;forward flexed posture  -      Gait, Safety Issues balance decreased during turns;weight-shifting ability decreased  -      Gait, Impairments impaired balance  -      Gait, Comment Pt cued for improved posture, forward gaze with minimal improvement  -EH      Recorded by [] Shauna Todd PT      Motor Skills/Interventions    Additional Documentation Balance Skills Training (Group)  -EH      Recorded by [] Shauna Todd PT      Balance Skills Training    Standing-Level of Assistance Contact guard  -      Static Standing Balance Support assistive device  -      Gait Balance-Level of Assistance Contact guard  -      Gait Balance Support assistive device  -      Gait Balance Activities scanning environment R/L  -EH      Recorded by [] Shauna Todd PT      Positioning and Restraints    Pre-Treatment Position sitting in chair/recliner  -      Post Treatment Position chair  -EH      In Chair notified nsg;reclined;call light within reach;encouraged to call for assist;exit alarm on  -EH      Recorded by [] Shauna Todd PT        User Key  (r) = Recorded By, (t) = Taken By, (c) = Cosigned By    Initials Name Effective Dates    DAVE Abbi Mason, PT 06/19/15 -     EH Shauna Todd, PT 06/19/15 -      Basilia Gautam, OT 06/08/16 -                 IP PT Goals       07/19/17 1552 07/17/17 1557 07/16/17 1404    Transfer Training PT LTG    Transfer Training PT LTG, Outcome goal ongoing  -EH goal ongoing  -EH goal ongoing  -MB    Gait Training  PT LTG    Gait Training Goal PT LTG, Outcome goal ongoing  -EH goal ongoing  -EH goal ongoing  -MB    Dynamic Standing Balance PT LTG    Dynamic Standing Balance PT LTG, Outcome goal ongoing  -EH  goal ongoing  -MB      07/14/17 1314 07/12/17 1415 07/11/17 1420    Transfer Training PT LTG    Transfer Training PT LTG, Outcome goal ongoing  -EH goal ongoing  -EH goal ongoing  -EH    Gait Training PT LTG    Gait Training Goal PT LTG, Outcome goal ongoing  -EH goal ongoing  -EH goal ongoing  -EH    Dynamic Standing Balance PT LTG    Dynamic Standing Balance PT LTG, Outcome goal ongoing  -EH  goal ongoing  -EH      07/10/17 1152 07/09/17 1207       Transfer Training PT LTG    Transfer Training PT LTG, Outcome goal ongoing  -DAVE (r) KR (t) DAVE (c)      Gait Training PT LTG    Gait Training Goal PT LTG, Outcome goal ongoing  -DAVE (r) KR (t) DAVE (c) goal ongoing  -LS     Dynamic Standing Balance PT LTG    Dynamic Standing Balance PT LTG, [place] Level  conditional independence  -LS     Dynamic Standing Balance PT LTG, Assist Device  assistive Device  -LS     Dynamic Standing Balance PT LTG, Date Goal Reviewed  07/09/17   previous goal achieved 7/9  -LS     Dynamic Standing Balance PT LTG, Outcome goal ongoing  -DAVE (r) KR (t) DAVE (c) goal revised  -LS       User Key  (r) = Recorded By, (t) = Taken By, (c) = Cosigned By    Initials Name Provider Type    DAVE Mason, PT Physical Therapist    EH Shauna Todd, PT Physical Therapist    LS Shae Rolbedo, PT Physical Therapist    GAIL Nixon, PT Physical Therapist    MADI Shen, PT Student PT Student          Physical Therapy Education     Title: PT OT SLP Therapies (Active)     Topic: Physical Therapy (Active)     Point: Mobility training (Active)    Learning Progress Summary    Learner Readiness Method Response Comment Documented by Status   Patient Acceptance E NR  DAVE 07/20/17 1343 Active    Acceptance E VU  ESTELLA 07/19/17 1805 Done    Acceptance E  VU,NR   07/19/17 1552 Done    Acceptance E NR   07/17/17 1556 Active    Nonacceptance E,D NR home safety, fall precautions, benefits of activity/therapy MB 07/16/17 1403 Active    Acceptance E NR   07/14/17 1314 Active    Acceptance E VU,NR   07/12/17 1414 Done    Acceptance E NR   07/11/17 1420 Active    Acceptance E NR   07/10/17 1152 Active    Acceptance E,D NR   07/09/17 1207 Active    Acceptance E NR   07/06/17 1412 Active               Point: Home exercise program (Active)    Learning Progress Summary    Learner Readiness Method Response Comment Documented by Status   Patient Acceptance E NR   07/20/17 1343 Active    Acceptance E VU   07/19/17 1805 Done    Acceptance E VU,NR   07/19/17 1552 Done    Acceptance E NR   07/17/17 1556 Active    Nonacceptance E,D NR home safety, fall precautions, benefits of activity/therapy MB 07/16/17 1403 Active    Acceptance E NR   07/14/17 1314 Active    Acceptance E VU,NR   07/12/17 1414 Done    Acceptance E NR   07/11/17 1420 Active    Acceptance E,D NR   07/09/17 1207 Active               Point: Body mechanics (Active)    Learning Progress Summary    Learner Readiness Method Response Comment Documented by Status   Patient Acceptance E NR   07/20/17 1343 Active    Acceptance E VU   07/19/17 1805 Done    Acceptance E VU,NR   07/19/17 1552 Done    Acceptance E NR   07/17/17 1556 Active    Nonacceptance E,D NR home safety, fall precautions, benefits of activity/therapy MB 07/16/17 1403 Active    Acceptance E NR   07/14/17 1314 Active    Acceptance E VU,NR   07/12/17 1414 Done    Acceptance E NR   07/11/17 1420 Active    Acceptance E NR   07/10/17 1152 Active    Acceptance E,D NR   07/09/17 1207 Active    Acceptance E NR   07/06/17 1412 Active               Point: Precautions (Active)    Learning Progress Summary    Learner Readiness Method Response Comment Documented by Status   Patient Acceptance E NR   07/20/17 1343 Active     Acceptance E VU  KA 07/19/17 1805 Done    Acceptance E VU,NR  EH 07/19/17 1552 Done    Acceptance E NR  EH 07/17/17 1556 Active    Nonacceptance E,D NR home safety, fall precautions, benefits of activity/therapy MB 07/16/17 1403 Active    Acceptance E NR  EH 07/14/17 1314 Active    Acceptance E VU,NR  EH 07/12/17 1414 Done    Acceptance E NR  EH 07/11/17 1420 Active    Acceptance E NR  KR 07/10/17 1152 Active    Acceptance E,D NR  LS 07/09/17 1207 Active    Acceptance E NR  SJ 07/06/17 1412 Active                      User Key     Initials Effective Dates Name Provider Type Discipline    DAVE 06/19/15 -  Abbi Mason, PT Physical Therapist PT     06/19/15 -  Shauna Todd, PT Physical Therapist PT     06/19/15 -  Sakshi Gutierrez, PT Physical Therapist PT     06/19/15 -  Shae Robledo, PT Physical Therapist PT    MB 03/14/16 -  Arlene Nixon, PT Physical Therapist PT    KR 05/30/17 -  Vijaya Shen, PT Student PT Student PT     06/23/17 -  Shawna Reynolds RN Registered Nurse Nurse                    PT Recommendation and Plan  Anticipated Discharge Disposition: inpatient rehabilitation facility  Planned Therapy Interventions: balance training, bed mobility training, gait training, home exercise program, patient/family education, strengthening, transfer training  PT Frequency: daily  Plan of Care Review  Plan Of Care Reviewed With: patient  Progress: improving  Outcome Summary/Follow up Plan: patient able to increase ambulation to 160 ft patient again limited by leg pain           Outcome Measures       07/20/17 1130 07/19/17 1516 07/18/17 1442    How much help from another person do you currently need...    Turning from your back to your side while in flat bed without using bedrails? 4  -DAVE 4  -EH     Moving from lying on back to sitting on the side of a flat bed without bedrails? 3  -DAVE 3  -EH     Moving to and from a bed to a chair (including a wheelchair)? 3  -DAVE 3  -EH     Standing up from  a chair using your arms (e.g., wheelchair, bedside chair)? 3  -DAVE 3  -EH     Climbing 3-5 steps with a railing? 2  -DAVE 2  -EH     To walk in hospital room? 3  -DAVE 3  -EH     AM-PAC 6 Clicks Score 18  -DAVE 18  -EH     How much help from another is currently needed...    Putting on and taking off regular lower body clothing?   2  -CL    Bathing (including washing, rinsing, and drying)   2  -CL    Toileting (which includes using toilet bed pan or urinal)   2  -CL    Putting on and taking off regular upper body clothing   3  -CL    Taking care of personal grooming (such as brushing teeth)   4  -CL    Eating meals   4  -CL    Score   17  -CL    Functional Assessment    Outcome Measure Options  AM-PAC 6 Clicks Basic Mobility (PT)  - AM-PAC 6 Clicks Daily Activity (OT)  -      07/17/17 1519          How much help from another person do you currently need...    Turning from your back to your side while in flat bed without using bedrails? 4  -EH      Moving from lying on back to sitting on the side of a flat bed without bedrails? 3  -EH      Moving to and from a bed to a chair (including a wheelchair)? 3  -EH      Standing up from a chair using your arms (e.g., wheelchair, bedside chair)? 3  -EH      Climbing 3-5 steps with a railing? 2  -EH      To walk in hospital room? 3  -EH      AM-PAC 6 Clicks Score 18  -EH      Functional Assessment    Outcome Measure Options AM-[facility] 6 Clicks Basic Mobility (PT)  -        User Key  (r) = Recorded By, (t) = Taken By, (c) = Cosigned By    Initials Name Provider Type    DAVE Mason, PT Physical Therapist    EH Shauna Todd, PT Physical Therapist    CL Basilia Gautam, OT Occupational Therapist           Time Calculation:         PT Charges       07/20/17 1345          Time Calculation    Start Time 1130  -DAVE      PT Received On 07/20/17  -DAVE      PT Goal Re-Cert Due Date 07/21/17  -DAVE      Time Calculation- PT    Total Timed Code Minutes- PT 28 minute(s)  -DAVE        User  Key  (r) = Recorded By, (t) = Taken By, (c) = Cosigned By    Initials Name Provider Type    DAVE Abbi Mason, PT Physical Therapist          Therapy Charges for Today     Code Description Service Date Service Provider Modifiers Qty    [id number] HC PT THER PROC EA 15 MIN 7/20/2017 Abbi Mason, PT GP 2          PT G-Codes  Outcome Measure Options: AM-PAC 6 Clicks Basic Mobility (PT)    Abbi Mason PT  7/20/2017

## 2017-07-20 NOTE — PLAN OF CARE
Problem: Patient Care Overview (Adult)  Goal: Plan of Care Review  Outcome: Ongoing (interventions implemented as appropriate)    07/20/17 1343   Coping/Psychosocial Response Interventions   Plan Of Care Reviewed With patient   Patient Care Overview   Progress improving   Outcome Evaluation   Outcome Summary/Follow up Plan patient able to increase ambulation to 160 ft patient again limited by leg pain          Problem: Inpatient Physical Therapy  Goal: Transfer Training Goal 1 LTG- PT  Outcome: Ongoing (interventions implemented as appropriate)    07/06/17 1410 07/19/17 1552   Transfer Training PT LTG   Transfer Training PT LTG, Date Established 07/06/17 --    Transfer Training PT LTG, Time to Achieve 2 wks --    Transfer Training PT LTG, Activity Type bed to chair /chair to bed;sit to stand/stand to sit --    Transfer Training PT LTG, [place] Level supervision required --    Transfer Training PT LTG, Assist Device walker, rolling --    Transfer Training PT LTG, Outcome --  goal ongoing       Goal: Gait Training Goal LTG- PT  Outcome: Ongoing (interventions implemented as appropriate)    07/06/17 1410 07/19/17 1552   Gait Training PT LTG   Gait Training Goal PT LTG, Date Established 07/06/17 --    Gait Training Goal PT LTG, Time to Achieve 2 wks --    Gait Training Goal PT LTG, [place] supervision required --    Gait Training Goal PT LTG, Assist Device walker, rolling --    Gait Training Goal PT LTG, Distance to Achieve 200 --    Gait Training Goal PT LTG, Outcome --  goal ongoing       Goal: Dynamic Standing Balance Goal LTG- PT  Outcome: Ongoing (interventions implemented as appropriate)    07/06/17 1410 07/09/17 1207 07/19/17 1552   Dynamic Standing Balance PT LTG   Dynamic Standing Balance PT LTG, Date Established 07/06/17 --  --    Dynamic Standing Balance PT LTG, Time to Achieve 2 wks --  --    Dynamic Standing Balance PT LTG, [place] Level --  conditional independence --    Dynamic  Standing Balance PT LTG, Assist Device --  assistive Device --    Dynamic Standing Balance PT LTG, Date Goal Reviewed --  07/09/17  (previous goal achieved 7/9) --    Dynamic Standing Balance PT LTG, Outcome --  --  goal ongoing

## 2017-07-20 NOTE — PROGRESS NOTES
Continued Stay Note  [facility]     Patient Name: Luis F Plummer  MRN: [id number]  Today's Date: 7/20/2017    Admit Date: 7/5/2017          Discharge Plan       07/20/17 0915    Case Management/Social Work Plan    Plan update    Patient/Family In Agreement With Plan yes    Additional Comments Called Denisha with [facility] and left a voicemail to request another look for acceptance due to cardiology note stateing patient could go to rehab without a Life Vest.  Awaiting callback.  CM following.                Discharge Codes     None        Expected Discharge Date and Time     Expected Discharge Date Expected Discharge Time    Jul 20, 2017             Joselin Gong RN

## 2017-07-20 NOTE — PROGRESS NOTES
Continued Stay Note  [facility]     Patient Name: Luis F Plummer  MRN: [id number]  Today's Date: 7/20/2017    Admit Date: 7/5/2017          Discharge Plan       07/20/17 1159    Case Management/Social Work Plan    Plan update    Patient/Family In Agreement With Plan yes    Additional Comments Spoke with Denisha with [facility] who can offer a bed to the patient tomorrow if he receives the Life Vest.  CM to call Denisha to confirm patient reciept of Life Vest.  CM following.        07/20/17 1046    Case Management/Social Work Plan    Plan update    Patient/Family In Agreement With Plan yes    Additional Comments Spoke to Denisha with [facility] and advised that patient will be receiving a Life Vest prior to discharge.  Denisha to check on bed availability for tomorrow and advise CM.        07/20/17 1041    Case Management/Social Work Plan    Plan update    Patient/Family In Agreement With Plan yes    Additional Comments Received a call from Shawna Resendiz with [employer] who request CM to retrieve a extra /number from the patient.  CM spoke with patient at bedside who refused to provide a contact number.  Advised Shawna Resendiz who indicates that she will see patient today and discuss.  CM following.        07/20/17 0915    Case Management/Social Work Plan    Plan update    Patient/Family In Agreement With Plan yes    Additional Comments Called Denisha Mckeon and left a voicemail to request another look for acceptance due to cardiology note stateing patient could go to rehab without a Life Vest.  Awaiting callback.  CM following.                Discharge Codes     None        Expected Discharge Date and Time     Expected Discharge Date Expected Discharge Time    Jul 20, 2017             Joselin Gong, RN

## 2017-07-20 NOTE — PROGRESS NOTES
Continued Stay Note  [facility]     Patient Name: Luis F Plummer  MRN: [id number]  Today's Date: 7/20/2017    Admit Date: 7/5/2017          Discharge Plan       07/20/17 1041    Case Management/Social Work Plan    Plan update    Patient/Family In Agreement With Plan yes    Additional Comments Received a call from Shawna Resendiz with [employer] who request CM to retrieve a extra /number from the patient.  CM spoke with patient at bedside who refused to provide a contact number.  Advised Shawna Resendiz who indicates that she will see patient today and discuss.  CM following.        07/20/17 0915    Case Management/Social Work Plan    Plan update    Patient/Family In Agreement With Plan yes    Additional Comments Called eDnisha with [facility] and left a voicemail to request another look for acceptance due to cardiology note stateing patient could go to rehab without a Life Vest.  Awaiting callback.  CM following.                Discharge Codes     None        Expected Discharge Date and Time     Expected Discharge Date Expected Discharge Time    Jul 20, 2017             Joselin Gong, RN

## 2017-07-21 VITALS
DIASTOLIC BLOOD PRESSURE: 71 MMHG | OXYGEN SATURATION: 97 % | WEIGHT: 170 LBS | HEIGHT: 71 IN | TEMPERATURE: 97.8 F | HEART RATE: 73 BPM | RESPIRATION RATE: 18 BRPM | BODY MASS INDEX: 23.8 KG/M2 | SYSTOLIC BLOOD PRESSURE: 120 MMHG

## 2017-07-21 LAB
ANION GAP SERPL CALCULATED.3IONS-SCNC: 5 MMOL/L (ref 3–11)
BUN BLD-MCNC: 62 MG/DL (ref 9–23)
BUN/CREAT SERPL: 38.8 (ref 7–25)
CALCIUM SPEC-SCNC: 9 MG/DL (ref 8.7–10.4)
CHLORIDE SERPL-SCNC: 100 MMOL/L (ref 99–109)
CO2 SERPL-SCNC: 29 MMOL/L (ref 20–31)
CREAT BLD-MCNC: 1.6 MG/DL (ref 0.6–1.3)
GFR SERPL CREATININE-BSD FRML MDRD: 43 ML/MIN/1.73
GLUCOSE BLD-MCNC: 191 MG/DL (ref 70–100)
GLUCOSE BLDC GLUCOMTR-MCNC: 118 MG/DL (ref 70–130)
GLUCOSE BLDC GLUCOMTR-MCNC: 205 MG/DL (ref 70–130)
POTASSIUM BLD-SCNC: 4.5 MMOL/L (ref 3.5–5.5)
SODIUM BLD-SCNC: 134 MMOL/L (ref 132–146)

## 2017-07-21 PROCEDURE — 82962 GLUCOSE BLOOD TEST: CPT

## 2017-07-21 PROCEDURE — 25010000002 HEPARIN (PORCINE) PER 1000 UNITS: Performed by: NURSE PRACTITIONER

## 2017-07-21 PROCEDURE — 99239 HOSP IP/OBS DSCHRG MGMT >30: CPT | Performed by: NURSE PRACTITIONER

## 2017-07-21 PROCEDURE — 80048 BASIC METABOLIC PNL TOTAL CA: CPT | Performed by: NURSE PRACTITIONER

## 2017-07-21 RX ORDER — CARVEDILOL 3.12 MG/1
3.12 TABLET ORAL 2 TIMES DAILY
Start: 2017-07-21

## 2017-07-21 RX ORDER — NYSTATIN 100000 [USP'U]/G
POWDER TOPICAL EVERY 12 HOURS SCHEDULED
Refills: 0
Start: 2017-07-21

## 2017-07-21 RX ORDER — CALCIUM CARBONATE 200(500)MG
2 TABLET,CHEWABLE ORAL 2 TIMES DAILY PRN
Start: 2017-07-21

## 2017-07-21 RX ORDER — LEVOTHYROXINE SODIUM 0.05 MG/1
50 TABLET ORAL DAILY
Start: 2017-07-21

## 2017-07-21 RX ORDER — LOSARTAN POTASSIUM 50 MG/1
50 TABLET ORAL DAILY
Start: 2017-07-21

## 2017-07-21 RX ORDER — DOXYCYCLINE HYCLATE 100 MG/1
100 CAPSULE ORAL EVERY 12 HOURS SCHEDULED
Refills: 0
Start: 2017-07-21 | End: 2017-07-23

## 2017-07-21 RX ORDER — NITROGLYCERIN 0.4 MG/1
0.4 TABLET SUBLINGUAL
Refills: 12
Start: 2017-07-21

## 2017-07-21 RX ORDER — ACETAMINOPHEN 325 MG/1
650 TABLET ORAL EVERY 4 HOURS PRN
Refills: 0
Start: 2017-07-21

## 2017-07-21 RX ORDER — BUMETANIDE 1 MG/1
1 TABLET ORAL 2 TIMES DAILY
Start: 2017-07-21

## 2017-07-21 RX ADMIN — HEPARIN SODIUM 5000 UNITS: 5000 INJECTION, SOLUTION INTRAVENOUS; SUBCUTANEOUS at 09:05

## 2017-07-21 RX ADMIN — LOSARTAN POTASSIUM 50 MG: 50 TABLET, FILM COATED ORAL at 08:59

## 2017-07-21 RX ADMIN — NYSTATIN: 100000 POWDER TOPICAL at 09:01

## 2017-07-21 RX ADMIN — DOXYCYCLINE HYCLATE 100 MG: 100 CAPSULE ORAL at 09:00

## 2017-07-21 RX ADMIN — CARVEDILOL 3.12 MG: 3.12 TABLET, FILM COATED ORAL at 08:59

## 2017-07-21 RX ADMIN — ASPIRIN 81 MG: 81 TABLET, COATED ORAL at 08:59

## 2017-07-21 RX ADMIN — INSULIN LISPRO 4 UNITS: 100 INJECTION, SOLUTION INTRAVENOUS; SUBCUTANEOUS at 09:00

## 2017-07-21 RX ADMIN — BUMETANIDE 1 MG: 1 TABLET ORAL at 08:59

## 2017-07-21 NOTE — DISCHARGE INSTR - APPOINTMENTS
You have an appointment with oJey Nuno MD on October 27, 2017 @ 12:12 PM.   Call them if you have any questions. Phone: [phone] [address] [place] [address]

## 2017-07-21 NOTE — PLAN OF CARE
Problem: Patient Care Overview (Adult)  Goal: Plan of Care Review  Outcome: Ongoing (interventions implemented as appropriate)    07/21/17 0700   Coping/Psychosocial Response Interventions   Plan Of Care Reviewed With patient   Patient Care Overview   Progress no change   Outcome Evaluation   Outcome Summary/Follow up Plan Patient became uncooperative, agitated, anxious, and argumentative (see significant event note at 0535). Awaiting discharge to [facility] today. Refused AM medications and refused to answer assessment questions or patient identifiers, charge nurse notified.         Problem: Skin Integrity Impairment, Risk/Actual (Adult)  Goal: Identify Related Risk Factors and Signs and Symptoms  Outcome: Outcome(s) achieved Date Met:  07/21/17    Problem: Cardiac Output, Decreased (Adult)  Goal: Identify Related Risk Factors and Signs and Symptoms  Outcome: Outcome(s) achieved Date Met:  07/21/17  Goal: Adequate Cardiac Output/Effective Tissue Perfusion  Outcome: Ongoing (interventions implemented as appropriate)

## 2017-07-21 NOTE — PROGRESS NOTES
"Adult Nutrition  Assessment/PES    Patient Name:  Luis F Plummer  YOB: 1945  MRN: [id number]  Admit Date:  7/5/2017    Assessment Date:  7/21/2017        Reason for Assessment       07/21/17 1549    Reason for Assessment    Reason For Assessment/Visit follow up protocol    Time Spent (min) 20    Diagnosis Diagnosis   Per notes this admission              Anthropometrics       07/21/17 1550    Anthropometrics    Height 180.3 cm (71\")    Weight 77.1 kg (170 lb)    Ideal Body Weight (IBW)    Ideal Body Weight (IBW), Male (kg) 79.27    % Ideal Body Weight 97.48    Body Mass Index (BMI)    BMI (kg/m2) 23.76            Labs/Tests/Procedures/Meds       07/21/17 1550    Labs/Tests/Procedures/Meds    Labs/Tests Review Reviewed              Nutrition Prescription Ordered       07/21/17 1550    Nutrition Prescription PO    Current PO Diet Regular    Supplement Boost Glucose Control    Supplement Frequency 2 times a day    Common Modifiers Cardiac;Consistent Carbohydrate            Evaluation of Received Nutrient/Fluid Intake       07/21/17 1551    PO Evaluation    Number of Meals 6    % PO Intake 96        Problem/Interventions:        Problem 1       07/21/17 1551    Nutrition Diagnoses Problem 1    Problem 1 Nutrition Appropriate for Condition at this Time    Signs/Symptoms (evidenced by) PO Intake    Percent (%) intake recorded 96 %    Over number of meals 6              Intervention Goal       07/21/17 1552    Intervention Goal    General Nutrition support treatment    PO Maintain intake            Nutrition Intervention       07/21/17 1552    Nutrition Intervention    RD/Tech Action Follow Tx progress;Care plan reviewd              Education/Evaluation       07/21/17 1552    Monitor/Evaluation    Monitor Per protocol;PO intake;Supplement intake         Electronically signed by:  Shamkia Sorto  07/21/17 3:52 PM  "

## 2017-07-21 NOTE — DISCHARGE SUMMARY
[facility] Medicine Services  DISCHARGE SUMMARY       Date of Admission: 7/5/2017  Date of Discharge:  7/21/2017  Primary Care Physician: No Known Provider  Consulting Physician(s)     Provider Relationship    Homar Argueta DO Consulting Physician          Discharge Diagnoses:  Active Hospital Problems (** Indicates Principal Problem)    Diagnosis Date Noted   • **Acute on chronic systolic heart failure [I50.23] 07/06/2017     · NYHA Class II     • Ischemic cardiomyopathy [I25.5] 07/10/2017   • Noncompliance [Z91.19] 07/10/2017   • Peripheral edema [R60.9] 07/06/2017   • Coronary artery disease of native artery of native heart with stable angina pectoris [I25.118] 07/06/2017     · Cardiac catheterization at [facility] (01/26/2015): Multivessel disease.  Data deficit   · Coronary artery bypass with Dr. Rodríguez (01/27/2015): LIMA to LAD.  SVG to OM.  SVG to PDA.  LVEF 20-25%.  · Primary cardiologist is Dr. Bautista     • Essential hypertension [I10] 07/06/2017   • Hyperlipidemia LDL goal <70 [E78.5] 07/06/2017     · High-intensity statin therapy indicated given presence coronary artery disease       • Type 2 diabetes mellitus [E11.9] 07/06/2017   • CKD (chronic kidney disease) [N18.9] 07/06/2017   • Peripheral neuropathy [G62.9] 07/06/2017   • Cellulitis-legs/feet with chronic ulcer- no osteomyelitis on MRI [L03.90] 07/06/2017      Resolved Hospital Problems    Diagnosis Date Noted Date Resolved   • STEMI (ST elevation myocardial infarction) [I21.3] 07/10/2017 07/10/2017   • Tick bite [W57.XXXA] 07/06/2017 07/14/2017       Presenting Problem/History of Present Illness  Peripheral edema [R60.9]       History of Present Illness on Day of Discharge: Patient is resting in bed in NAD. He states he did not sleep well last night. He denies any soa, cp, fever, chills or n/v/d. Patient is agreeable to rehab today. He is 97% on room air.    Hospital Course  Patient is a 71 y.o.  male  With a PMH of DM, HTN, CKD, ischemic cardiomyopathy, and noncompliance. Patient presented to [facility] ED with complaints of recurrently BLE swelling for the last 4 days. Patient had been noncompliant's with taking diuretics. States lasix makes it burn when he voids and torsemide cause BLE ulceration. IN ED bp 167/119, troponin 0.02, BNP 2432.    Patient was admitted to hospital medicine for further treatment of his chronic systolic CHF and Cardiology was consulted. Patient was started on IV Bumex and has diureses well and has been transitioned over to PO. BNP improved to 408. He was continued on ASA, ARB, statin and a beta blocker. ECHO showed EF 20%. Patient has a life vest on and will be transferred with it. Patient had a left pleural effusion which improved with diuresing and he did not require a thoracentesis. Patient needs to follow up with his primary cardiologist in 90 days and have another ECHO to reassess his EF and possible need for an ICD.     Arterial duplex was done due to ulcer on feet and it did not show any significant stenosis. MRI was done on left foot showed no osteomyelitis but did show cellutlitis. Venous lower extremity showed no DVT.  Ulcers likely due to poor fitting shoes.Patient also had a tick on his leg when he was admitted. He is being treated with Doxycycline for tick bite and cellulitits and will finish out a 14 day treatment tomorrow.     Patient has CKD and his renal function needs to be monitored closely and diuretic adjusted if he has any worsening. Creatinine was elevated on 7/20/17 to 1.80 and Bumex was held yesterday. Creatinine today was 1.60 and Bumex was resumed.     Patient has had good blood glucose control on current insulin regiment. When patient is discharge from rehab he will need help with obtaining medications. He does have financial issues with affording medications.     Patient will be discharged to rehab today. He will need to see a facility provider within 1-2  days and see his PCP within one week of d/c from rehab. He will need to see Dr. Bautista in 90 days with an Echo. He will need to have a bmp in two days. He will need to have a TSH in 4 weeks. Patient was on Sildenafil at home not for sure on compliance with taking. He has not been on medication while in hospital will not resume at this time and will defer to his primary cardiologist on restarting if needed.   Procedures Performed         Consults:   Consults     Date and Time Order Name Status Description    7/19/2017 1349 Inpatient Consult to Cardiac Electrophysiologist Completed     7/6/2017 0339 Inpatient Consult to Cardiology Completed           Pertinent Test Results:  Lab Results (last 24 hours)     Procedure Component Value Units Date/Time    POC Glucose Fingerstick [[id number]]  (Abnormal) Collected:  07/20/17 1640    Specimen:  Blood Updated:  07/20/17 1641     Glucose 211 (H) mg/dL     Narrative:       Meter: [id number] : [id number] Whitney Nichols    POC Glucose Fingerstick [[id number]]  (Abnormal) Collected:  07/20/17 1955    Specimen:  Blood Updated:  07/20/17 1957     Glucose 233 (H) mg/dL     Narrative:       Meter: [id number] : [id number] Harsh Wick    POC Glucose Fingerstick [[id number]]  (Abnormal) Collected:  07/21/17 0739    Specimen:  Blood Updated:  07/21/17 0741     Glucose 205 (H) mg/dL     Narrative:       Meter: [id number] : [id number] Shirin Aldridge    Basic Metabolic Panel [[id number]]  (Abnormal) Collected:  07/21/17 0707    Specimen:  Blood Updated:  07/21/17 0744     Glucose 191 (H) mg/dL      BUN 62 (H) mg/dL      Creatinine 1.60 (H) mg/dL      Sodium 134 mmol/L      Potassium 4.5 mmol/L      Chloride 100 mmol/L      CO2 29.0 mmol/L      Calcium 9.0 mg/dL      eGFR Non African Amer 43 (L) mL/min/1.73      BUN/Creatinine Ratio 38.8 (H)     Anion Gap 5.0 mmol/L     Narrative:       National Kid[employer] Guidelines    Stage     Description        GFR  1         Normal or  "High     90+  2         Mild decrease      60-89  3         Moderate decrease  30-59  4         Severe decrease    15-29  5         Kidney failure     <15    POC Glucose Fingerstick [[id number]]  (Normal) Collected:  07/21/17 1141    Specimen:  Blood Updated:  07/21/17 1144     Glucose 118 mg/dL     Narrative:       Meter: [id number] : [id number] Shirin Aldridge        Imaging Results (last 24 hours)     ** No results found for the last 24 hours. **         Value - Date/Time         Parasite Identification [[id number]] Collected: 07/07/17 0915       Lab Status: Final result Specimen: Parasite from Leg, Left Updated: 07/07/17 1016        Macroscopic Exam HARD TICK NOT OTHERWISE SPECIATED               Condition on Discharge:  Stable     Physical Exam on Discharge:/71 (BP Location: Right arm, Patient Position: Sitting)  Pulse 73  Temp 97.8 °F (36.6 °C) (Oral)   Resp 18  Ht 71\" (180.3 cm)  Wt 170 lb (77.1 kg)  SpO2 97%  BMI 23.71 kg/m2  Physical Exam   Constitutional: He is oriented to person, place, and time. He appears well-developed and well-nourished. No distress.   HENT:   Head: Normocephalic.   Eyes: Pupils are equal, round, and reactive to light.   Neck: Normal range of motion.   Cardiovascular: Normal rate, regular rhythm and intact distal pulses.  Exam reveals no friction rub.    No murmur heard.  Pulmonary/Chest: Effort normal and breath sounds normal. No stridor. No respiratory distress. He has no wheezes. He has no rales.   Abdominal: Soft. Bowel sounds are normal. He exhibits no distension. There is no tenderness.   Musculoskeletal: Normal range of motion. He exhibits no edema.   Neurological: He is alert and oriented to person, place, and time.   Skin: Skin is warm and dry. He is not diaphoretic. There is erythema.   Some excoriations and scabbing on lower extremities. Skins is dry and peeling in some area.    Psychiatric: He has a normal mood and affect.   Vitals reviewed.        Discharge " Disposition  Rehab Facility or Unit (DC - External)    Discharge Medications   Luis F Plummer   Home Medication Instructions GOLDEN:[id number]    Printed on:07/21/17 0058   Medication Information                      acetaminophen (TYLENOL) 325 MG tablet  Take 2 tablets by mouth Every 4 (Four) Hours As Needed for Mild Pain (1-3).             aspirin 81 MG chewable tablet  Chew 81 mg Daily.             atorvastatin (LIPITOR) 20 MG tablet  Take 20 mg by mouth Every Night.             bumetanide (BUMEX) 1 MG tablet  Take 1 tablet by mouth 2 (Two) Times a Day.             calcium carbonate (TUMS) 500 MG chewable tablet  Chew 1,000 mg 2 (Two) Times a Day As Needed for Heartburn.             carvedilol (COREG) 3.125 MG tablet  Take 1 tablet by mouth 2 (Two) Times a Day.             doxycycline (VIBRAMYCIN) 100 MG capsule  Take 1 capsule by mouth Every 12 (Twelve) Hours for 3 doses. Indications: Skin and Soft Tissue Infection             Ferrous Sulfate Dried  (50 FE) MG tablet controlled-release  Take 325 mg by mouth 3 (Three) Times a Day With Meals.             insulin detemir (LEVEMIR) 100 UNIT/ML injection  Inject 5 Units under the skin Every Night.             insulin lispro (humaLOG) 100 UNIT/ML injection  Inject 0-9 Units under the skin 4 (Four) Times a Day Before Meals & at Bedtime.             insulin lispro (humaLOG) 100 UNIT/ML injection  Inject 3 Units under the skin 3 (Three) Times a Day With Meals.             levothyroxine (SYNTHROID, LEVOTHROID) 50 MCG tablet  Take 1 tablet by mouth Daily.             losartan (COZAAR) 50 MG tablet  Take 1 tablet by mouth Daily.             melatonin 3 MG tablet  Take 3 mg by mouth At Night As Needed.             nitroglycerin (NITROSTAT) 0.4 MG SL tablet  Place 1 tablet under the tongue Every 5 (Five) Minutes As Needed for Chest Pain. Take no more than 3 doses in 15 minutes.             nystatin (MYCOSTATIN) 483386 UNIT/GM powder  Apply  topically Every 12 (Twelve)  Hours.                 Discharge Diet:   Diet Instructions     Diet: Cardiac, Consistent Carbohydrate; Thin Liquids, No Restrictions       Discharge Diet:   Cardiac  Consistent Carbohydrate      Fluid Consistency:  Thin Liquids, No Restrictions                 Discharge Care Plan / Instructions:    Activity at Discharge:   Activity Instructions     Activity as Tolerated           Measure Blood Pressure           Measure Weight                     Follow-up Appointments  Future Appointments  Date Time Provider Department Center   8/18/2017 10:00 AM FRANCO Flaherty [facility]     Additional Instructions for the Follow-ups that You Need to Schedule     Discharge Follow-Up With Specified Provider    As directed    To:  Dr. Bautista Cardiology in [place]   Follow Up Details:  90 days       Discharge Follow-up with PCP    As directed    Follow Up Details:  needs to see facility provider within 1-2 days. needs to see PCP within one week of d/c from rehab.                 Test Results Pending at Discharge       FRANCO Ramirez 07/21/17 1:04 PM    Time: Discharge 45 min    Please note that portions of this note may have been completed with a voice recognition program. Efforts were made to edit the dictations, but occasionally words are mistranscribed.

## 2017-07-21 NOTE — PROGRESS NOTES
Continued Stay Note  [facility]     Patient Name: Luis F Plummer  MRN: [id number]  Today's Date: 7/21/2017    Admit Date: 7/5/2017          Discharge Plan       07/21/17 0905    Case Management/Social Work Plan    Plan update    Patient/Family In Agreement With Plan yes    Additional Comments Spoke to patient at bedside regarding discharge plan.  Patient has received his lLife Vest and has a bed today at [facility].  Patient now indictes he does not have anyone who can take him.  Called Denisha with [employer] to check availability of [employer].  Denisha will call back with availability.  Patient can transport by [employer] or Cab depending on availability.  CM following.              Discharge Codes     None        Expected Discharge Date and Time     Expected Discharge Date Expected Discharge Time    Jul 21, 2017             Joselin Gong RN

## 2017-07-21 NOTE — PLAN OF CARE
Problem: Patient Care Overview (Adult)  Goal: Plan of Care Review  Outcome: Outcome(s) achieved Date Met:  07/21/17 07/21/17 0158   Coping/Psychosocial Response Interventions   Plan Of Care Reviewed With other (see comments)   Patient Care Overview   Progress progress toward functional goals as expected   Outcome Evaluation   Outcome Summary/Follow up Plan report given to CHER Colon at [facility]. Notified that pt will be coming via cab.

## 2017-07-21 NOTE — SIGNIFICANT NOTE
Pt refused to cooperate with neuro assessment including orientation questions, patient identifiers for medication administration, refused to allow me to auscultate heart, lungs, and bowel sounds after I woke him in order to assess and administer his morning medications, Synthroid and Pantoprazole.  I explained the necessity for waking the patient in order to assess the patient's neurologic, cardiac, and respiratory functioning.  I explained to the patient that I would take no more than five minutes to perform my assessment and that he could then return to sleep.  He requests that I not enter the room again.  Charge nurse notified.

## 2017-07-21 NOTE — PROGRESS NOTES
Continued Stay Note  [facility]     Patient Name: Luis F Plummer  MRN: [id number]  Today's Date: 7/21/2017    Admit Date: 7/5/2017          Discharge Plan       07/21/17 1146    Case Management/Social Work Plan    Plan update    Patient/Family In Agreement With Plan yes    Additional Comments Received a call from Denisha that the [employer] will not have availability today.  Spoke with patient and advised him that we can provide transportation via cab to rehab, patient in agreement.  Cab Voucher on chart.  Patient to discharge to [facility] today via cab for transport.  Nursing to call report to [phone].  CM to fax d/c summary to [phone]      07/21/17 0905    Case Management/Social Work Plan    Plan update    Patient/Family In Agreement With Plan yes    Additional Comments Spoke to patient at bedside regarding discharge plan.  Patient has received his lLife Vest and has a bed today at [facility].  Patient now indictes he does not have anyone who can take him.  Called Denisha with [facility] to check availability of [employer].  Denisha will call back with availability.  Patient can transport by [employer] or Cab depending on availability.  CM following.              Discharge Codes     None        Expected Discharge Date and Time     Expected Discharge Date Expected Discharge Time    Jul 21, 2017             Joselin Gong, RN

## 2017-07-24 NOTE — THERAPY DISCHARGE NOTE
Acute Care - Occupational Therapy Discharge Summary  [facility]     Patient Name: Luis F Plummer  : 1945  MRN: [id number]    Today's Date: 2017  Onset of Illness/Injury or Date of Surgery Date: 17    Date of Referral to OT: 17  Referring Physician: FRANCO Irizarry      Admit Date: 2017        OT Recommendation and Plan    Visit Dx:    ICD-10-CM ICD-9-CM   1. Peripheral edema R60.9 782.3   2. Pleural effusion J90 511.9   3. Acute pulmonary edema J81.0 518.4   4. Acute on chronic systolic congestive heart failure I50.23 428.23     428.0   5. Hyperlipidemia LDL goal <70 E78.5 272.4   6. Acute on chronic systolic heart failure I50.23 428.23   7. Coronary artery disease of native artery of native heart with stable angina pectoris I25.118 414.01     413.9   8. Impaired mobility and ADLs Z74.09 799.89   9. Impaired functional mobility, balance, gait, and endurance Z74.09 V49.89                     OT Goals       17 1527 17 1120 17 1349    Bed Mobility OT LTG    Bed Mobility OT LTG, Outcome goal ongoing  -CL goal ongoing  -JR goal ongoing  -AC    Transfer Training OT LTG    Transfer Training OT LTG, Outcome goal ongoing  -CL goal ongoing  -JR goal ongoing  -AC    LB Dressing OT LTG    LB Dressing Goal OT LTG, Date Established 17  -CL      LB Dressing Goal OT LTG, Time to Achieve by discharge  -CL      LB Dressing Goal OT LTG, Outcome  goal partially met  -JR goal partially met   met to don socks  -AC      17 1431          Bed Mobility OT LTG    Bed Mobility OT LTG, Outcome goal ongoing   progressing  -MM      Transfer Training OT LTG    Transfer Training OT LTG, Outcome goal ongoing   progressing  -MM      LB Dressing OT LTG    LB Dressing Goal OT LTG, Outcome goal partially met   supervision for socks  -MM        User Key  (r) = Recorded By, (t) = Taken By, (c) = Cosigned By    Initials Name Provider Type    POONAM Cantor, OT Occupational Therapist    MM  Brenda Duaret, OT Occupational Therapist    JR Hilary Bolden, OT Occupational Therapist    TODD Gautam, OT Occupational Therapist                  OT Discharge Summary  Anticipated Discharge Disposition: inpatient rehabilitation facility  Reason for Discharge: Discharge from facility  Outcomes Achieved: Refer to plan of care for updates on goals achieved  Discharge Destination: Inpatient rehabilitation facility      Hilary Bolden, OT  7/24/2017

## 2017-07-27 NOTE — PROGRESS NOTES
Continued Stay Note  [facility]     Patient Name: Luis F Plummer  MRN: [id number]  Today's Date: 7/27/2017    Admit Date: 7/5/2017          Discharge Plan       07/27/17 1545    Case Management/Social Work Plan    Additional Comments Pt was discharged to a skilled bed at [facility] (Skilled nursing facility) on 07/21/17.              Discharge Codes       07/27/17 1546    Discharge Codes    Discharge Codes 83  R- To skilled nursing facility        Expected Discharge Date and Time     Expected Discharge Date Expected Discharge Time    Jul 21, 2017             Hilary Lozano

## 2020-09-02 NOTE — PLAN OF CARE
SUBJECTIVE:   Jose Alberto uL is a 42 year old male who presents to clinic today for the following health issues:      Hernia      Duration: has had since 2009, 2010 had mesh placed, pain has returns    Description (location/character/radiation): left groin    Intensity:  moderate    Accompanying signs and symptoms:     History (similar episodes/previous evaluation): hernia surgery with mesh placement in 2010    Precipitating or alleviating factors: None    Therapies tried and outcome: None    Relates he missed his MRI appt and needs to get it rescheduled he thinks. MRI was for neck pain           Problem list and histories reviewed & adjusted, as indicated.  Additional history: as documented    Patient Active Problem List   Diagnosis     Dysthymic disorder     MARY (generalized anxiety disorder)     GERD (gastroesophageal reflux disease)     Hypothyroidism     Polysubstance abuse     Schizoaffective disorder (H)     History reviewed. No pertinent surgical history.    Social History   Substance Use Topics     Smoking status: Current Every Day Smoker     Packs/day: 0.50     Years: 20.00     Smokeless tobacco: Never Used     Alcohol use No     History reviewed. No pertinent family history.      Current Outpatient Prescriptions   Medication Sig Dispense Refill     levothyroxine (SYNTHROID/LEVOTHROID) 50 MCG tablet Take 50 mcg by mouth       ARIPiprazole (ABILIFY) 10 MG tablet Take 1 tablet (10 mg) by mouth daily (Patient not taking: Reported on 3/28/2018) 30 tablet 0     naproxen (NAPROSYN) 500 MG tablet Take 1 tablet (500 mg) by mouth 2 times daily (with meals) (Patient not taking: Reported on 3/28/2018) 20 tablet 0     No Known Allergies    Reviewed and updated as needed this visit by clinical staff  Tobacco       Reviewed and updated as needed this visit by Provider         ROS:  CONSTITUTIONAL: NEGATIVE for fever, chills, change in weight  INTEGUMENTARY/SKIN: irritated face from shaving  RESP: NEGATIVE for  Problem: Patient Care Overview (Adult)  Goal: Plan of Care Review  Outcome: Ongoing (interventions implemented as appropriate)    07/09/17 1207   Coping/Psychosocial Response Interventions   Plan Of Care Reviewed With patient   Patient Care Overview   Progress improving   Outcome Evaluation   Outcome Summary/Follow up Plan Pt demonstrated ability to progress forward ambulation distance to 70 total ft on RA with use of RWx. Cont to be limited by fatigue and c/o LE pain. Gave good effort with seated ther ex. Will progress as clinically warranted.          Problem: Inpatient Physical Therapy  Goal: Transfer Training Goal 1 LTG- PT  Outcome: Ongoing (interventions implemented as appropriate)    07/06/17 1410   Transfer Training PT LTG   Transfer Training PT LTG, Date Established 07/06/17   Transfer Training PT LTG, Time to Achieve 2 wks   Transfer Training PT LTG, Activity Type bed to chair /chair to bed;sit to stand/stand to sit   Transfer Training PT LTG, [place] Level supervision required   Transfer Training PT LTG, Assist Device walker, rolling   Transfer Training PT LTG, Outcome goal ongoing       Goal: Gait Training Goal LTG- PT  Outcome: Ongoing (interventions implemented as appropriate)    07/06/17 1410 07/09/17 1207   Gait Training PT LTG   Gait Training Goal PT LTG, Date Established 07/06/17 --    Gait Training Goal PT LTG, Time to Achieve 2 wks --    Gait Training Goal PT LTG, [place] Level supervision required --    Gait Training Goal PT LTG, Assist Device walker, rolling --    Gait Training Goal PT LTG, Distance to Achieve 200 --    Gait Training Goal PT LTG, Outcome --  goal ongoing       Goal: Dynamic Standing Balance Goal LTG- PT  Outcome: Revised    07/06/17 1410 07/09/17 1207   Dynamic Standing Balance PT LTG   Dynamic Standing Balance PT LTG, Date Established 07/06/17 --    Dynamic Standing Balance PT LTG, Time to Achieve 2 wks --    Dynamic Standing Balance PT LTG, [place] Level --   "significant cough or SOB  CV: NEGATIVE for chest pain, palpitations or peripheral edema  GI: left groin area discomfort history of hernia surgery, last BM today normal  : negative for dysuria, hematuria, decreased urinary stream, erectile dysfunction  MUSCULOSKELETAL: chronic neck pain  NEURO: NEGATIVE for weakness, dizziness or paresthesias    OBJECTIVE:     /70  Pulse 105  Temp 97.6  F (36.4  C) (Tympanic)  Resp 16  Ht 5' 9\" (1.753 m)  Wt 155 lb (70.3 kg)  SpO2 98%  BMI 22.89 kg/m2  Body mass index is 22.89 kg/(m^2).   GENERAL: alert and no distress  RESP: lungs clear to auscultation - no rales, rhonchi or wheezes  CV: regular rate and rhythm, normal S1 S2, no S3 or S4, no murmur, click or rub, no peripheral edema and peripheral pulses strong  ABDOMEN: soft, nontender, no hepatosplenomegaly, no masses and bowel sounds normal  SKIN: no suspicious lesions or rashes  PSYCH: mentation appears normal, affect flat and very soft spoken    Diagnostic Test Results:  none     ASSESSMENT/PLAN:     1. Neck pain  Jose Alberto missed his MRI appointment for his neck. Plan to reschedule.     Exam today for possible mesh moving from hernia repair. Unable to find scar or surgical site. Jose Alberto does not know where the surgery was preformed he thinks it was around 2010. At this time I do not see any hernia to the left groin area. Plan to monitor. If symptoms return can re-evaluate: possible US of area.    Jose Alberto agrees with plan today.           See Patient Instructions    MARIO ALBERTO [facility] [place]  " conditional independence   Dynamic Standing Balance PT LTG, Assist Device --  assistive Device   Dynamic Standing Balance PT LTG, Date Goal Reviewed --  07/09/17  (previous goal achieved 7/9)   Dynamic Standing Balance PT LTG, Outcome --  goal revised            Discharged

## 2021-06-01 NOTE — PROGRESS NOTES
[facility] Medicine Services  INPATIENT PROGRESS NOTE    Date of Admission: 7/5/2017  Length of Stay: 14  Primary Care Physician: No Known Provider    Subjective   CC: f/u  LE edema    HPI:  Sitting up in chair, eating lunch. No new complaints. Eager to go rehab.     Review Of Systems:   Review of Systems   Constitutional: Negative for fever.   Cardiovascular: Positive for chest pain. Negative for leg swelling.   Gastrointestinal: Negative for abdominal pain.   Skin: Positive for wound.   All other systems reviewed and are negative.      Objective      Temp:  [98.9 °F (37.2 °C)] 98.9 °F (37.2 °C)  Heart Rate:  [71-76] 71  Resp:  [18] 18  BP: ()/(55-74) 103/63  Physical Exam   Constitutional: He appears well-developed and well-nourished. No distress.   HENT:   Head: Normocephalic.   Eyes: Pupils are equal, round, and reactive to light. No scleral icterus.   Neck: Neck supple.   Cardiovascular: Normal rate, regular rhythm and normal heart sounds.    No murmur heard.  Pulmonary/Chest: Effort normal and breath sounds normal. No respiratory distress.   diminshed at the bases   Abdominal: Soft. Bowel sounds are normal. He exhibits no distension. There is no tenderness.   Musculoskeletal: He exhibits no edema or tenderness.   Lymphadenopathy:     He has no cervical adenopathy.   Neurological: He is alert.   Seems oriented, very [place]  No focal deficits   Skin: Skin is warm and dry. No ecchymosis and no rash noted. No erythema.   Some excoriations and scabbing on LEs   Psychiatric: He has a normal mood and affect.   Vitals reviewed.      Results Review:    I have reviewed the labs, radiology results and diagnostic studies.      Results from last 7 days  Lab Units 07/20/17  0519   WBC 10*3/mm3 7.14   HEMOGLOBIN g/dL 13.2   PLATELETS 10*3/mm3 215       Results from last 7 days  Lab Units 07/20/17  0640   SODIUM mmol/L 134   POTASSIUM mmol/L 4.7   CHLORIDE mmol/L 98*   CO2 mmol/L 23.0   BUN mg/dL  ANTICOAGULATION  MANAGEMENT    Assessment     Today's INR result of 3.0 is Therapeutic (goal INR of 2.0-3.0)        Warfarin taken as previously instructed    No new diet changes affecting INR    Interaction between prednisone and warfarin may be affecting INR - went back to taking 10 mg daily of prednisone    Continues to tolerate warfarin with no reported s/s of bleeding or thromboembolism     Previous INR was Subtherapeutic    Plan:     Spoke with Myron regarding INR result and instructed:     Warfarin Dosing Instructions:  Continue current warfarin dose 2 mg daily on Tue; and 4 mg daily rest of week  (0 % change)  Pt is prepping for cardioversion,  Suggest keeping dose to prevent another subtherapeutic INR    Instructed patient to follow up no later than: 1 week, but pt would like to check this Thur, ANNAMARIA okay with this and appt was scheduled    Education provided: target INR goal and significance of current INR result, importance of following up for INR monitoring at instructed interval, importance of taking warfarin as instructed, potential interaction between warfarin and prednisone and importance of notifying clinic for changes in medications    Myron verbalizes understanding and agrees to warfarin dosing plan.    Instructed to call the [facility] for any changes, questions or concerns. (#[phone])   ?   Yoko Gamino RN    Subjective/Objective:      Myron MOHSEN Sunshine, a 88 y.o. male is on warfarin.     Myron reports:     Home warfarin dose: verbally confirmed home dose with Myron and updated on anticoagulation calendar     Missed doses: No     Medication changes:  Yes: 10 mg daily of prednisone     S/S of bleeding or thromboembolism:  No     New Injury or illness:  No     Changes in diet or alcohol consumption:  No     Upcoming surgery, procedure or cardioversion:  Yes: cardioversion    Anticoagulation Episode Summary     Current INR goal:   2.0-3.0   TTR:   61.0 %   Next INR check:   9/30/2019   INR from  78*   CREATININE mg/dL 1.80*   GLUCOSE mg/dL 138*   CALCIUM mg/dL 9.1       Culture Data: none     Radiology Data:   All imaging reviewed    Echo - EF=20%    I have reviewed the medications.    Assessment/Plan     Problem List  Hospital Problem List     * (Principal)Acute on chronic systolic heart failure    Overview Signed 7/6/2017 11:09 AM by FRANCO Wood     · NYHA Class II         Peripheral edema    Coronary artery disease of native artery of native heart with stable angina pectoris    Overview Addendum 7/6/2017 11:06 AM by FRANCO Wood     · Cardiac catheterization at [facility] (01/26/2015): Multivessel disease.  Data deficit   · Coronary artery bypass with Dr. Rodríguez (01/27/2015): LIMA to LAD.  SVG to OM.  SVG to PDA.  LVEF 20-25%.  · Primary cardiologist is Dr. Bautista         Essential hypertension    Hyperlipidemia LDL goal <70    Overview Addendum 7/6/2017 11:09 AM by FRANCO Wood     · High-intensity statin therapy indicated given presence coronary artery disease           Type 2 diabetes mellitus    CKD (chronic kidney disease)    Peripheral neuropathy    Cellulitis-legs/feet with chronic ulcer- no osteomyelitis on MRI    Ischemic cardiomyopathy    Noncompliance        Assessment/Plan:  - clinically much improved from admission.  Has diuresed up to 23L based on I/Os.  PO bumex resumed, renal function stable until today, rechecked BMP and noted to have an EDNA on labs this morning. Holding Bumex today and recheck BMP in am. Creat 1.8.   - EF=20%, on BB, ARB, statin.    -Has had a lot of difficulty getting approved for a Life Vest due to previously not returning one. Worked with Shawna montilla for several days and now patient has been approved for a life vest, though has been okayed by EP to be discharged/ transferred to facility without vest.   -Should still continue to follow up with primary cardiologist in [place]  to determine need for ICD in  last check:   3.00 (9/23/2019)   Weekly max warfarin dose:      Target end date:      INR check location:      Preferred lab:      Send INR reminders to:   [facility]    Indications    Persistent atrial fibrillation (H) [I48.1]           Comments:   new order INR 2/5/19         Anticoagulation Care Providers     Provider Role Specialty Phone number    Donna Thakkar MD Referring Cardiology [phone]         future once he has been medically managed for an optimal amount of time ~ 90 days per EP.   - extremely important for CM to check that patient is able to afford any medications that he will be discharged on to help assist him with medical compliance, as he says this is why he has been noncompliant in past.   - had some hypoglycemia and adjusted prandial doses, now good control and will leave on current regimen.   - patient with noted tick on arrival.  On doxy planned 14d (12/14 today).  Should also cover cellulitis seen on MRI.  - pleural effusion, mostly resolved with diuresis, no need for thoracentesis at this point  - BMP in am   - reviewed CM notes, working on placement. [facility] has approved for bed, needs life vest. Shawna Resendiz will be providing vest.     High complexity.    DVT prophylaxis: Heparin      Ese Stoddard, APRN   07/20/17   5:21 PM